# Patient Record
Sex: FEMALE | Race: WHITE | Employment: OTHER | URBAN - NONMETROPOLITAN AREA
[De-identification: names, ages, dates, MRNs, and addresses within clinical notes are randomized per-mention and may not be internally consistent; named-entity substitution may affect disease eponyms.]

---

## 2019-10-03 ENCOUNTER — HOSPITAL ENCOUNTER (OUTPATIENT)
Dept: GENERAL RADIOLOGY | Age: 54
Discharge: HOME | End: 2019-10-03
Payer: COMMERCIAL

## 2019-10-03 ENCOUNTER — HOSPITAL ENCOUNTER (OUTPATIENT)
Dept: PAIN MANAGEMENT | Age: 54
Discharge: HOME | End: 2019-10-03
Payer: COMMERCIAL

## 2019-10-03 VITALS
OXYGEN SATURATION: 98 % | TEMPERATURE: 97.5 F | HEART RATE: 70 BPM | HEIGHT: 65 IN | WEIGHT: 243.38 LBS | BODY MASS INDEX: 40.55 KG/M2 | RESPIRATION RATE: 16 BRPM

## 2019-10-03 DIAGNOSIS — M53.3 SI (SACROILIAC) JOINT DYSFUNCTION: ICD-10-CM

## 2019-10-03 DIAGNOSIS — G89.29 CHRONIC PAIN OF BOTH SHOULDERS: ICD-10-CM

## 2019-10-03 DIAGNOSIS — M25.511 CHRONIC PAIN OF BOTH SHOULDERS: ICD-10-CM

## 2019-10-03 DIAGNOSIS — M25.512 CHRONIC PAIN OF BOTH SHOULDERS: ICD-10-CM

## 2019-10-03 DIAGNOSIS — M70.61 TROCHANTERIC BURSITIS OF RIGHT HIP: ICD-10-CM

## 2019-10-03 DIAGNOSIS — G89.29 CHRONIC BILATERAL LOW BACK PAIN WITHOUT SCIATICA: ICD-10-CM

## 2019-10-03 DIAGNOSIS — M54.50 CHRONIC BILATERAL LOW BACK PAIN WITHOUT SCIATICA: ICD-10-CM

## 2019-10-03 DIAGNOSIS — G57.01 PIRIFORMIS SYNDROME OF RIGHT SIDE: ICD-10-CM

## 2019-10-03 PROCEDURE — 99215 OFFICE O/P EST HI 40 MIN: CPT

## 2019-10-03 PROCEDURE — 99204 OFFICE O/P NEW MOD 45 MIN: CPT | Performed by: NURSE PRACTITIONER

## 2019-10-03 PROCEDURE — 73521 X-RAY EXAM HIPS BI 2 VIEWS: CPT

## 2019-10-03 PROCEDURE — 72110 X-RAY EXAM L-2 SPINE 4/>VWS: CPT

## 2019-10-03 RX ORDER — CLOTRIMAZOLE 1 %
CREAM (GRAM) TOPICAL PRN
COMMUNITY

## 2019-10-03 RX ORDER — HYDROCODONE BITARTRATE AND ACETAMINOPHEN 7.5; 325 MG/1; MG/1
1 TABLET ORAL EVERY 6 HOURS PRN
Qty: 90 TABLET | Refills: 0 | Status: SHIPPED | OUTPATIENT
Start: 2019-10-04 | End: 2019-11-04 | Stop reason: SDUPTHER

## 2019-10-03 RX ORDER — IBUPROFEN 200 MG
200 TABLET ORAL EVERY 8 HOURS PRN
COMMUNITY
End: 2020-07-31 | Stop reason: ALTCHOICE

## 2019-10-03 RX ORDER — FAMOTIDINE 20 MG/1
20 TABLET, FILM COATED ORAL 2 TIMES DAILY
COMMUNITY

## 2019-10-03 RX ORDER — BUSPIRONE HYDROCHLORIDE 5 MG/1
5 TABLET ORAL 2 TIMES DAILY PRN
COMMUNITY
End: 2021-11-23

## 2019-10-03 RX ORDER — ASPIRIN 325 MG
325 TABLET ORAL DAILY
Status: ON HOLD | COMMUNITY
End: 2021-01-26

## 2019-10-14 PROBLEM — M25.511 CHRONIC PAIN OF BOTH SHOULDERS: Status: ACTIVE | Noted: 2019-10-14

## 2019-10-14 PROBLEM — M70.61 TROCHANTERIC BURSITIS OF RIGHT HIP: Status: ACTIVE | Noted: 2019-10-14

## 2019-10-14 PROBLEM — G89.29 CHRONIC PAIN OF BOTH SHOULDERS: Status: ACTIVE | Noted: 2019-10-14

## 2019-10-14 PROBLEM — M25.512 CHRONIC PAIN OF BOTH SHOULDERS: Status: ACTIVE | Noted: 2019-10-14

## 2019-10-14 ASSESSMENT — ENCOUNTER SYMPTOMS
CONSTIPATION: 0
BACK PAIN: 1

## 2019-10-23 ENCOUNTER — HOSPITAL ENCOUNTER (OUTPATIENT)
Dept: PAIN MANAGEMENT | Age: 54
Discharge: HOME | End: 2019-10-23
Payer: COMMERCIAL

## 2019-10-23 VITALS
HEART RATE: 66 BPM | SYSTOLIC BLOOD PRESSURE: 131 MMHG | RESPIRATION RATE: 18 BRPM | DIASTOLIC BLOOD PRESSURE: 59 MMHG | OXYGEN SATURATION: 95 % | TEMPERATURE: 98.6 F

## 2019-10-23 PROCEDURE — 20553 NJX 1/MLT TRIGGER POINTS 3/>: CPT

## 2019-10-23 PROCEDURE — 20611 DRAIN/INJ JOINT/BURSA W/US: CPT | Performed by: NURSE PRACTITIONER

## 2019-10-23 PROCEDURE — 20611 DRAIN/INJ JOINT/BURSA W/US: CPT

## 2019-10-23 PROCEDURE — 20553 NJX 1/MLT TRIGGER POINTS 3/>: CPT | Performed by: NURSE PRACTITIONER

## 2019-10-23 PROCEDURE — 76942 ECHO GUIDE FOR BIOPSY: CPT | Performed by: NURSE PRACTITIONER

## 2019-10-23 PROCEDURE — 6360000002 HC RX W HCPCS

## 2019-10-23 PROCEDURE — 2500000003 HC RX 250 WO HCPCS

## 2019-10-23 RX ORDER — LIDOCAINE HYDROCHLORIDE 10 MG/ML
INJECTION, SOLUTION EPIDURAL; INFILTRATION; INTRACAUDAL; PERINEURAL
Status: COMPLETED | OUTPATIENT
Start: 2019-10-23 | End: 2019-10-23

## 2019-10-23 RX ORDER — TRIAMCINOLONE ACETONIDE 40 MG/ML
INJECTION, SUSPENSION INTRA-ARTICULAR; INTRAMUSCULAR
Status: COMPLETED | OUTPATIENT
Start: 2019-10-23 | End: 2019-10-23

## 2019-10-23 RX ORDER — METHOCARBAMOL 500 MG/1
1000 TABLET, FILM COATED ORAL 4 TIMES DAILY
Qty: 240 TABLET | Refills: 0 | Status: SHIPPED | OUTPATIENT
Start: 2019-10-23 | End: 2019-11-22

## 2019-10-23 RX ORDER — BUPIVACAINE HYDROCHLORIDE 5 MG/ML
INJECTION, SOLUTION EPIDURAL; INTRACAUDAL
Status: COMPLETED | OUTPATIENT
Start: 2019-10-23 | End: 2019-10-23

## 2019-10-23 RX ADMIN — TRIAMCINOLONE ACETONIDE 80 MG: 40 INJECTION, SUSPENSION INTRA-ARTICULAR; INTRAMUSCULAR at 13:20

## 2019-10-23 RX ADMIN — BUPIVACAINE HYDROCHLORIDE 13 ML: 5 INJECTION, SOLUTION EPIDURAL; INTRACAUDAL at 13:18

## 2019-10-23 RX ADMIN — LIDOCAINE HYDROCHLORIDE 13 ML: 10 INJECTION, SOLUTION EPIDURAL; INFILTRATION; INTRACAUDAL; PERINEURAL at 13:19

## 2019-10-25 ENCOUNTER — TELEPHONE (OUTPATIENT)
Dept: PAIN MANAGEMENT | Age: 54
End: 2019-10-25

## 2019-11-04 DIAGNOSIS — M54.50 CHRONIC BILATERAL LOW BACK PAIN WITHOUT SCIATICA: ICD-10-CM

## 2019-11-04 DIAGNOSIS — G89.29 CHRONIC BILATERAL LOW BACK PAIN WITHOUT SCIATICA: ICD-10-CM

## 2019-11-04 RX ORDER — HYDROCODONE BITARTRATE AND ACETAMINOPHEN 7.5; 325 MG/1; MG/1
1 TABLET ORAL EVERY 6 HOURS PRN
Qty: 90 TABLET | Refills: 0 | Status: SHIPPED | OUTPATIENT
Start: 2019-11-04 | End: 2019-12-02 | Stop reason: ALTCHOICE

## 2019-12-01 ENCOUNTER — TELEPHONE (OUTPATIENT)
Dept: PAIN MANAGEMENT | Age: 54
End: 2019-12-01

## 2019-12-01 DIAGNOSIS — G89.11 ACUTE PAIN DUE TO INJURY: Primary | ICD-10-CM

## 2019-12-02 ENCOUNTER — HOSPITAL ENCOUNTER (OUTPATIENT)
Dept: MRI IMAGING | Age: 54
Discharge: HOME | End: 2019-12-02
Payer: COMMERCIAL

## 2019-12-02 ENCOUNTER — HOSPITAL ENCOUNTER (OUTPATIENT)
Dept: PAIN MANAGEMENT | Age: 54
Discharge: HOME | End: 2019-12-02
Payer: COMMERCIAL

## 2019-12-02 VITALS
TEMPERATURE: 98.6 F | HEIGHT: 65 IN | BODY MASS INDEX: 40.48 KG/M2 | RESPIRATION RATE: 18 BRPM | DIASTOLIC BLOOD PRESSURE: 84 MMHG | SYSTOLIC BLOOD PRESSURE: 130 MMHG | HEART RATE: 85 BPM | WEIGHT: 243 LBS | OXYGEN SATURATION: 99 %

## 2019-12-02 DIAGNOSIS — G89.11 ACUTE PAIN DUE TO INJURY: ICD-10-CM

## 2019-12-02 DIAGNOSIS — Z71.6 ENCOUNTER FOR SMOKING CESSATION COUNSELING: ICD-10-CM

## 2019-12-02 DIAGNOSIS — G89.29 CHRONIC BILATERAL LOW BACK PAIN WITHOUT SCIATICA: Primary | ICD-10-CM

## 2019-12-02 DIAGNOSIS — M54.50 CHRONIC BILATERAL LOW BACK PAIN WITHOUT SCIATICA: Primary | ICD-10-CM

## 2019-12-02 PROCEDURE — 99214 OFFICE O/P EST MOD 30 MIN: CPT | Performed by: NURSE PRACTITIONER

## 2019-12-02 PROCEDURE — 99407 BEHAV CHNG SMOKING > 10 MIN: CPT | Performed by: NURSE PRACTITIONER

## 2019-12-02 PROCEDURE — 99214 OFFICE O/P EST MOD 30 MIN: CPT

## 2019-12-02 PROCEDURE — 6360000002 HC RX W HCPCS

## 2019-12-02 PROCEDURE — 72148 MRI LUMBAR SPINE W/O DYE: CPT

## 2019-12-02 RX ORDER — HYDROCODONE BITARTRATE AND ACETAMINOPHEN 10; 325 MG/1; MG/1
1 TABLET ORAL EVERY 8 HOURS PRN
Qty: 90 TABLET | Refills: 0 | Status: SHIPPED | OUTPATIENT
Start: 2019-12-02 | End: 2020-05-22

## 2019-12-02 RX ORDER — TRIAMCINOLONE ACETONIDE 40 MG/ML
80 INJECTION, SUSPENSION INTRA-ARTICULAR; INTRAMUSCULAR ONCE
Status: DISCONTINUED | OUTPATIENT
Start: 2019-12-02 | End: 2019-12-04 | Stop reason: HOSPADM

## 2019-12-02 RX ORDER — HYDROCODONE BITARTRATE AND ACETAMINOPHEN 10; 325 MG/1; MG/1
1 TABLET ORAL EVERY 8 HOURS PRN
Qty: 90 TABLET | Refills: 0 | Status: SHIPPED | OUTPATIENT
Start: 2020-01-02 | End: 2020-01-23

## 2019-12-02 ASSESSMENT — PAIN DESCRIPTION - PAIN TYPE: TYPE: CHRONIC PAIN

## 2019-12-02 ASSESSMENT — PAIN - FUNCTIONAL ASSESSMENT: PAIN_FUNCTIONAL_ASSESSMENT: PREVENTS OR INTERFERES SOME ACTIVE ACTIVITIES AND ADLS

## 2019-12-02 ASSESSMENT — PAIN DESCRIPTION - FREQUENCY: FREQUENCY: CONTINUOUS

## 2019-12-02 ASSESSMENT — PAIN DESCRIPTION - DIRECTION: RADIATING_TOWARDS: DOES NOT RADIATE

## 2019-12-02 ASSESSMENT — ENCOUNTER SYMPTOMS
CONSTIPATION: 0
BACK PAIN: 1

## 2019-12-02 ASSESSMENT — PAIN DESCRIPTION - DESCRIPTORS: DESCRIPTORS: ACHING;CONSTANT

## 2019-12-02 ASSESSMENT — PAIN DESCRIPTION - PROGRESSION: CLINICAL_PROGRESSION: NOT CHANGED

## 2019-12-02 ASSESSMENT — PAIN DESCRIPTION - ORIENTATION: ORIENTATION: LOWER

## 2019-12-02 ASSESSMENT — PAIN DESCRIPTION - LOCATION: LOCATION: BACK

## 2019-12-02 ASSESSMENT — PAIN DESCRIPTION - ONSET: ONSET: ON-GOING

## 2019-12-02 ASSESSMENT — PAIN SCALES - GENERAL: PAINLEVEL_OUTOF10: 7

## 2019-12-13 ENCOUNTER — HOSPITAL ENCOUNTER (OUTPATIENT)
Dept: PAIN MANAGEMENT | Age: 54
Discharge: HOME | End: 2019-12-13
Payer: COMMERCIAL

## 2019-12-13 VITALS
OXYGEN SATURATION: 96 % | SYSTOLIC BLOOD PRESSURE: 132 MMHG | HEART RATE: 80 BPM | RESPIRATION RATE: 20 BRPM | TEMPERATURE: 97.7 F | DIASTOLIC BLOOD PRESSURE: 67 MMHG

## 2019-12-13 VITALS
DIASTOLIC BLOOD PRESSURE: 70 MMHG | RESPIRATION RATE: 18 BRPM | OXYGEN SATURATION: 99 % | SYSTOLIC BLOOD PRESSURE: 111 MMHG | HEART RATE: 68 BPM

## 2019-12-13 DIAGNOSIS — R52 PAIN MANAGEMENT: ICD-10-CM

## 2019-12-13 PROCEDURE — 3209999900 FLUORO FOR SURGICAL PROCEDURES

## 2019-12-13 PROCEDURE — 2500000003 HC RX 250 WO HCPCS

## 2019-12-13 PROCEDURE — 6360000002 HC RX W HCPCS

## 2019-12-13 PROCEDURE — 62323 NJX INTERLAMINAR LMBR/SAC: CPT

## 2019-12-13 PROCEDURE — 2580000003 HC RX 258

## 2019-12-13 RX ORDER — METHYLPREDNISOLONE ACETATE 80 MG/ML
INJECTION, SUSPENSION INTRA-ARTICULAR; INTRALESIONAL; INTRAMUSCULAR; SOFT TISSUE
Status: COMPLETED | OUTPATIENT
Start: 2019-12-13 | End: 2019-12-13

## 2019-12-13 RX ORDER — LIDOCAINE HYDROCHLORIDE 10 MG/ML
INJECTION, SOLUTION EPIDURAL; INFILTRATION; INTRACAUDAL; PERINEURAL
Status: COMPLETED | OUTPATIENT
Start: 2019-12-13 | End: 2019-12-13

## 2019-12-13 RX ORDER — SODIUM CHLORIDE 9 MG/ML
INJECTION INTRAVENOUS
Status: COMPLETED | OUTPATIENT
Start: 2019-12-13 | End: 2019-12-13

## 2019-12-13 RX ADMIN — LIDOCAINE HYDROCHLORIDE 5 ML: 10 INJECTION, SOLUTION EPIDURAL; INFILTRATION; INTRACAUDAL; PERINEURAL at 11:04

## 2019-12-13 RX ADMIN — METHYLPREDNISOLONE ACETATE 80 MG: 80 INJECTION, SUSPENSION INTRA-ARTICULAR; INTRALESIONAL; INTRAMUSCULAR; SOFT TISSUE at 11:04

## 2019-12-13 RX ADMIN — SODIUM CHLORIDE 5 ML: 9 INJECTION INTRAVENOUS at 11:04

## 2019-12-13 ASSESSMENT — PAIN - FUNCTIONAL ASSESSMENT: PAIN_FUNCTIONAL_ASSESSMENT: 0-10

## 2019-12-18 ENCOUNTER — TELEPHONE (OUTPATIENT)
Dept: PAIN MANAGEMENT | Age: 54
End: 2019-12-18

## 2020-01-24 RX ORDER — HYDROCODONE BITARTRATE AND ACETAMINOPHEN 10; 325 MG/1; MG/1
1 TABLET ORAL EVERY 8 HOURS PRN
Qty: 90 TABLET | Refills: 0 | Status: SHIPPED | OUTPATIENT
Start: 2020-01-27 | End: 2020-03-11 | Stop reason: SDUPTHER

## 2020-01-27 RX ORDER — HYDROCODONE BITARTRATE AND ACETAMINOPHEN 10; 325 MG/1; MG/1
1 TABLET ORAL EVERY 8 HOURS PRN
Qty: 90 TABLET | Refills: 0 | Status: CANCELLED | OUTPATIENT
Start: 2020-01-27 | End: 2020-02-26

## 2020-02-13 RX ORDER — METHOCARBAMOL 500 MG/1
500 TABLET, FILM COATED ORAL 4 TIMES DAILY
Qty: 120 TABLET | Refills: 0 | Status: SHIPPED | OUTPATIENT
Start: 2020-02-13 | End: 2020-03-10 | Stop reason: DRUGHIGH

## 2020-02-17 ENCOUNTER — HOSPITAL ENCOUNTER (OUTPATIENT)
Dept: PAIN MANAGEMENT | Age: 55
Discharge: HOME | End: 2020-02-17
Payer: COMMERCIAL

## 2020-02-17 VITALS
SYSTOLIC BLOOD PRESSURE: 137 MMHG | OXYGEN SATURATION: 97 % | WEIGHT: 231.13 LBS | DIASTOLIC BLOOD PRESSURE: 84 MMHG | BODY MASS INDEX: 38.51 KG/M2 | HEIGHT: 65 IN | TEMPERATURE: 98.1 F | HEART RATE: 81 BPM

## 2020-02-17 PROBLEM — M43.10 ANTEROLISTHESIS: Status: ACTIVE | Noted: 2020-02-17

## 2020-02-17 PROCEDURE — 99214 OFFICE O/P EST MOD 30 MIN: CPT | Performed by: NURSE PRACTITIONER

## 2020-02-17 PROCEDURE — 99213 OFFICE O/P EST LOW 20 MIN: CPT

## 2020-02-17 ASSESSMENT — PAIN SCALES - GENERAL: PAINLEVEL_OUTOF10: 5

## 2020-02-17 ASSESSMENT — PAIN DESCRIPTION - LOCATION: LOCATION: BACK;SHOULDER

## 2020-02-17 ASSESSMENT — PAIN DESCRIPTION - PAIN TYPE: TYPE: CHRONIC PAIN

## 2020-02-17 ASSESSMENT — PAIN DESCRIPTION - ORIENTATION: ORIENTATION: LOWER;RIGHT

## 2020-02-17 NOTE — PROGRESS NOTES
[facility] Physical [facility] Pain Medicine  Office Note    Patient Name: Ronald Edmondson    MR #: [id number]    Account #: [de-identified]    : 1965    Age: 62 y.o. Sex: female    Date: 2020    PCP: Sandy Oliveira    Chief Complaint: No chief complaint on file. History of Present Illness:     Ronald Edmondson is a 62 y.o. female who presents to the office for follow-up of procedure. Post procedure phone call on 2019 which was 5 days post injection patient reported that she felt the injection was helping, she was able to run errands and felt more steady on her feet. Patient's pain score went from a 7-8 down to a 1-2. And patient stated that she had at least 80% of her pain relieved by the injection and that she felt that the source of pain was targeted with the injection. Patient just returned from a 15-day cruise to [facility] in which she said 1 day she was unable to go offshore for excursions because of hip pain. Patient's biggest complaint is low back pain that goes into the right hip region patient had reported to the nurse today that LESI was not effective however patient's main complaint is the hip pain. Patient is due for a right SI, right greater trochanteric bursa and right piriformis in which she received good results from this injection in the past.  Patient does have instability in the lumbar spine according to the x-ray and patient would like to have a neurosurgical consultation. Back Pain   This is a chronic problem. The current episode started more than 1 year ago. The problem occurs constantly. The problem has been gradually worsening since onset. The pain is present in the lumbar spine, sacro-iliac and gluteal. The quality of the pain is described as aching, cramping and shooting. Radiates to: Intermittent radicular pain. The symptoms are aggravated by sitting, bending and standing. Associated symptoms include leg pain and numbness (Right lower extremity).  Pertinent negatives include no bladder incontinence, bowel incontinence or weakness. Last Procedure since last office visit:   Was Percentage of Pain Relief after Lumbar Epidural L4-5 on 12/13/2019:  60 %    How long lasted:  6 weeks No  Were you able to decrease pain medication after treatment? No  Were you able to increase activity after treatment? No  Did you have any adverse reactions to treatment? No    Screening Tools:  PEG Score: 7     Last PEG Score: 7.7     Annual ORT Score: 8     Annual PHQ Score: 6    Current Pain Assessment  Pain Assessment  Pain Assessment: 0-10  Pain Level: 5  Pain Type: Chronic pain  Pain Location: Back, Shoulder  Pain Orientation: Lower, Right    Past Visit HPI:  12/2/2018  presents to the office for procedure follow-up and office visit. Patient had right SI and right trochanteric bursa injection with ultrasound and right piriformis on 10/23/2019. Patient sustained 50% relief for approximately 1 month before diminishing of pain. Patient stated that the injection targeted the area of pain.     Patient requested sooner appointment due to increase in low back pain. Patient had called provider at home regarding acute low back pain with numbness to right lower extremity that had started suddenly on Saturday. 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 Patient stated that she had done fine on Friday and then on Saturday she just had and a sudden acute onset of low back pain that was gripping. At the time pain medication was not controlling her pain. Patient stated that she started to have numbness to her right foot that is intermittent and that when she is having the numbness that she feels as if she may fall. Patient rated her pain a 10 out of a 10 she described it is a stabbing in the low back. Patient states that sitting on the toilet will cause an increase in the radicular pain.   Patient states that she has to lean on the shopping cart when she is ambulating to help with the low back pain. Patient states that cleaning such as sweeping mopping or doing anything side to side significantly increases low back pain. MRI was ordered of the lumbar spine. Patient does have instability that was on x-ray of the lumbar spine.   However at the time the x-ray was taken patient was not having any type of radicular or nerve symptoms.     New Patient Appointment  10/3/19  presents with referral from [place] [address] primary complaints of chronic low back pain.  Pain has been a gradual onset that started around 2006. Rita Shines is aggravated by lifting bending and walking.  Patient had imaging done by Dr. Dieter Pradhan in [facility] which indicated she had 2 bulging disks.  Per patient she was told that she had 2 dead disks and 2 bulging disks in the lumbar region. Arlet Willingham was told that the hip pain was deferred from the bulging disks and this is progressively worsened over the past 12 years. Vero Crocker states the pain is now constant and causes a limp.  Patient has been tried on several nerve medications doxepin caused patient to sleepwalk, gabapentin caused patient to lose time, and amitriptyline made her feel as if her blood was boiling.  Patient is tried massage therapy but this causes her to become more tense.  Aggravating factors include ambulation prolonged sitting prolonged standing.  Patient states that she does not hurt in the morning but it progresses as the day goes on.     Other areas of pain that will be addressed at a later time is bilateral shoulder pain, hip pain and left knee pain.     Of note: Patient has numerous medical allergies penicillin causes hives, erythromycin causes hives, doxepin causes sleepwalking, gabapentin causes loss of memory, amitriptyline makes her feel like her blood boiling, Daypro eyes swelled shut, Soma horrible headaches, Percocet nightmares, Demerol nausea, statins muscle spasms, meloxicam irritates stomach ulcers, citalopram needed      clotrimazole (LOTRIMIN) 1 % cream Apply topically 2 times daily Apply topically 2 times daily.  diclofenac sodium 1 % GEL Apply 2 g topically 4 times daily as needed for Pain      DICLOFENAC PO Take 75 mg by mouth 2 times daily      famotidine (PEPCID) 20 MG tablet Take 20 mg by mouth 2 times daily      ibuprofen (ADVIL;MOTRIN) 200 MG tablet Take 200 mg by mouth every 8 hours as needed for Pain      mupirocin (BACTROBAN) 2 % ointment Apply topically as needed Apply topically 3 times daily.  diclofenac (FLECTOR) 1.3 % patch Place 1 patch onto the skin 2 times daily      hydrochlorothiazide (MICROZIDE) 12.5 MG capsule Take 25 mg by mouth daily       lisinopril (PRINIVIL;ZESTRIL) 20 MG tablet Take 10 mg by mouth daily       nystatin (MYCOSTATIN) 619727 UNIT/GM cream Apply topically 2 times daily Apply topically 2 times daily.  ALPRAZolam (XANAX) 1 MG tablet Take 1 mg by mouth nightly as needed for Sleep      Clobetasol Propionate Emulsion 0.05 % FOAM       hydrochlorothiazide (HYDRODIURIL) 12.5 MG tablet        No current facility-administered medications for this encounter. Review of Systems:  Review of Systems   Gastrointestinal: Negative for bowel incontinence and constipation. Genitourinary: Negative for bladder incontinence. Musculoskeletal: Positive for arthralgias, back pain and myalgias. Negative for neck pain. Neurological: Positive for numbness (Right lower extremity). Negative for weakness. Psychiatric/Behavioral: Positive for sleep disturbance. Negative for agitation, self-injury and suicidal ideas. The patient is not nervous/anxious. 14 point ROS negative besides that noted in HPI    Physical Exam:    Vitals:    02/17/20 1212   BP: 137/84   Pulse: 81   Temp: 98.1 °F (36.7 °C)   TempSrc: Temporal   SpO2: 97%   Weight: 231 lb 2 oz (104.8 kg)   Height: 5' 5\" (1.651 m)       Body mass index is 38.46 kg/m².     Physical Exam  Vitals signs and nursing note reviewed. Constitutional:       General: She is not in acute distress. Appearance: She is well-developed. HENT:      Head: Normocephalic. Right Ear: External ear normal.      Left Ear: External ear normal.      Nose: Nose normal.   Eyes:      Conjunctiva/sclera: Conjunctivae normal.      Pupils: Pupils are equal, round, and reactive to light. Neck:      Musculoskeletal: Normal range of motion and neck supple. Cardiovascular:      Rate and Rhythm: Normal rate. Pulmonary:      Effort: Pulmonary effort is normal.   Abdominal:      General: Bowel sounds are normal.      Palpations: Abdomen is soft. Musculoskeletal:      Right shoulder: She exhibits decreased range of motion and tenderness. Left shoulder: She exhibits decreased range of motion and tenderness. Lumbar back: She exhibits decreased range of motion, tenderness, pain and spasm. Comments: Unchanged assessment  Positive distraction, positive thigh thrust, positive Bharath's right greater than left  Right piriformis is tender taut and ropelike with trigger points identified  Greater trochanter tender to palpation on right  No foot drop or clonus noted   Skin:     General: Skin is warm and dry. Neurological:      Mental Status: She is alert and oriented to person, place, and time. Cranial Nerves: No cranial nerve deficit. Sensory: No sensory deficit. Psychiatric:         Behavior: Behavior normal.         Thought Content: Thought content normal.         Judgment: Judgment normal.       Most recent imaging, testing, and response to counseling and/or rehabilitation were reviewed with patient.   [x] Yes  [] No    Labs:  No results found for: NA, K, CL, CO2, BUN, CREATININE, GLUCOSE, CALCIUM     No results found for: WBC, HGB, HCT, MCV, PLT    Assessment:  Principal Problem:    Chronic bilateral low back pain without sciatica  Active Problems:    SI (sacroiliac) joint dysfunction    Piriformis syndrome of right side    Chronic pain of both shoulders    Trochanteric bursitis of right hip    Anterolisthesis  Resolved Problems:    * No resolved hospital problems. *      Plan:  1. Referral to Dr. Toña Coker for anterior listhesis 6 mm to 2 mm increase with flexion  2. Continue medications as previously prescribed  3. Schedule Right SI and Right TB with us and Right Piriformis Trigger point injections  4. Patient to continue exercises  5. And to follow-up post procedure or sooner if needed    Discussion: Discussed exam findings and plan of care with patient. Patient agreed with POC and questions were asked and answered. Activity: Discussed exercise as beneficial to pain reduction, encouraged daily stretching with a focus on torso strengthening. Goal:  Pain Management Goals of Therapy:   [] Resolution in pain  [x] Decrease in pain level  [x] Improvement in ADL's  [x] Increase in activities with less pain  [] Decrease in medication      Controlled substance monitoring:    [x] Discussed medication side effects, risk of tolerance and/or dependence, and/or alternative treatment  [] Discussed the detrimental effects of long term narcotic use in younger patients especially women of childbearing years.   [x] No signs and symptoms of potential drug abuse or diversion were identified  [] Signs of potential drug abuse or diversion were identified   [] ORT Score   [] UDS non-compliant   [] See Note  [] Random urine drug screen sent today  [x] Random urine drug screen not completed today   [] Deferred New Patient  [x] Compliant  12/2/19  [] Not Compliant see note  [] Medication agreement with provider signed today  [x] Medication agreement with provider on file under media   [] Medication regimen effective and continued   [] New patient continuing current medication while developing POC   [x] On going assessment and evaluation of medication regimen  [] Medication regimen not effective see plan for changes  [x] Magdy Tapia reviewed & on file

## 2020-02-18 ASSESSMENT — ENCOUNTER SYMPTOMS
BACK PAIN: 1
BOWEL INCONTINENCE: 0
CONSTIPATION: 0

## 2020-02-21 ENCOUNTER — TELEPHONE (OUTPATIENT)
Dept: NEUROSURGERY | Age: 55
End: 2020-02-21

## 2020-02-28 ENCOUNTER — TELEPHONE (OUTPATIENT)
Dept: NEUROSURGERY | Age: 55
End: 2020-02-28

## 2020-02-28 NOTE — TELEPHONE ENCOUNTER
Second attempt to reach patient to schedule new patient appointment with neurosurgery. I left a voicemail on the home phone but was unable to leave message on the mobile phone. I left our callback number in my message.

## 2020-02-28 NOTE — TELEPHONE ENCOUNTER
1. Referring physician? Malik Tim    2. Reason for referral?  Back pain    3. Who is completing questionnaire? Patient    4. Has the patient had any previous spinal/brain surgeries? NO    5. Has the patient seen another neurosurgeon and/or orthopedic surgeon for this issue in the past?          NO    6. Have MRI images been obtained within the last year? YES    7. If yes, where was the MRI performed? [facility]    a. What part of the body? Lumbar spine    b. When was it obtained? 12/2/19    8. Has the patient had a NCV/EMG within the last year? NO    9. Employment Status? Retired    8. Does the patient draw disability? NO    11. Symptoms? Low back pain, bilateral leg pain R>L, low back pain also R sided > L side. Patient states her back pain bothers her more.

## 2020-03-05 ENCOUNTER — TELEPHONE (OUTPATIENT)
Dept: PAIN MANAGEMENT | Age: 55
End: 2020-03-05

## 2020-03-05 ENCOUNTER — OFFICE VISIT (OUTPATIENT)
Dept: NEUROSURGERY | Age: 55
End: 2020-03-05
Payer: COMMERCIAL

## 2020-03-05 VITALS
WEIGHT: 245 LBS | DIASTOLIC BLOOD PRESSURE: 80 MMHG | OXYGEN SATURATION: 97 % | BODY MASS INDEX: 40.82 KG/M2 | HEART RATE: 82 BPM | HEIGHT: 65 IN | SYSTOLIC BLOOD PRESSURE: 144 MMHG

## 2020-03-05 PROCEDURE — 99204 OFFICE O/P NEW MOD 45 MIN: CPT | Performed by: NEUROLOGICAL SURGERY

## 2020-03-05 ASSESSMENT — ENCOUNTER SYMPTOMS
BACK PAIN: 1
EYES NEGATIVE: 1
CONSTIPATION: 1
WHEEZING: 1

## 2020-03-05 NOTE — TELEPHONE ENCOUNTER
I spoke with patient the other day regarding her muscle relaxer and forwarded message on to Avelina Steen. Called patient back and discussed whether she would like to increase the Robaxin or try Flexeril. Patient is ok with remaining on Robaxin, and so I will let Avelina Steen know and can send in with patient's next refill. She is not currently out of medication at this time, and is requesting increase with upcoming refill.

## 2020-03-05 NOTE — PROGRESS NOTES
Review of Systems   Constitutional: Negative. HENT: Negative. Eyes: Negative. Respiratory: Positive for wheezing. Cardiovascular: Negative. Gastrointestinal: Positive for constipation. Genitourinary: Negative. Musculoskeletal: Positive for back pain, joint pain and myalgias. Skin: Negative. Neurological: Negative. Endo/Heme/Allergies: Negative. Psychiatric/Behavioral: The patient is nervous/anxious.

## 2020-03-05 NOTE — PROGRESS NOTES
[facility] Neurosurgery  Office Visit      Chief Complaint   Patient presents with    Back Pain     Right sided low back pain that radiates into right buttock       HISTORY OF PRESENT ILLNESS:    Ronald Edmondson is a 62 y.o. female who presents with a long standing history of low back pain. The pain does radiate into the right buttocks and typically stops at the buttock, however, she has started having pain behind the knees over the last week. Her pain is mostly located in the low back. The patient denies numbness. She does mention having a history of \"sciatic\" pain that typically goes away with rest and medication, however, this episode has not subsided. She reports that any repetitive movements such as sweeping exacerbates her pain. She will often \"catch\" her right foot on steps or stumble over her toe. She has been seeing [facility] and has received LESI of L4-5, right greater trochanteric bursa and right piriformis injections. She mentions good success with the bursa injections. Her pain is worsened when going from a seated to standing position. Her pain is worsened with walking. Her pain is worsened when lying flat. Overall, indicative that the patient does have a mechanical nature to their pain. She states that 60% of her pain is located in the back and 40% is right buttock pain. The patient has underwent a non-operative treatment course that has included:  NSAIDs (diclofenac patch)  Muscle Relaxers (Robaxin)  Opiates (Norco - approx. 10 years)  Physical Therapy with core strengthening (multiple times)  Epidural Steroid Injections (Dr. Jesus TAVAREZ)  Massage Therapy      Of note she does use tobacco and does take blood thinning medications (ASA).                Past Medical History:   Diagnosis Date    Anxiety     Asthma     Colon polyps     DDD (degenerative disc disease), cervical     Depression     Essential hypertension 10/19/2016    GERD needed for Pain for up to 30 days. May fill today dosage increase Intended supply: 30 days 90 tablet 0     No current facility-administered medications for this visit. Allergies:  Citalopram; Daypro [oxaprozin]; Demerol hcl [meperidine]; Doxepin; Elavil [amitriptyline hcl]; Erythromycin; Gabapentin; Meloxicam; Penicillins; Percocet [oxycodone-acetaminophen]; Soma [carisoprodol]; and Statins    Social History:   Social History     Tobacco Use   Smoking Status Current Every Day Smoker    Start date: 10/19/1988   Smokeless Tobacco Never Used     Social History     Substance and Sexual Activity   Alcohol Use Not Currently         Family History:   Family History   Problem Relation Age of Onset    Parkinsonism Mother     Alzheimer's Disease Mother     Stroke Father     Substance Abuse Brother        REVIEW OF SYSTEMS:  Constitutional: Negative. HENT: Negative. Eyes: Negative. Respiratory: Positive for wheezing. Cardiovascular: Negative. Gastrointestinal: Positive for constipation. Genitourinary: Negative. Musculoskeletal: Positive for back pain, joint pain and myalgias. Skin: Negative. Neurological: Negative. Endo/Heme/Allergies: Negative. Psychiatric/Behavioral: The patient is nervous/anxious. PHYSICAL EXAM:  Vitals:    03/05/20 1359   BP: (!) 144/80   Pulse: 82   SpO2: 97%     Constitutional: appears well-developed and well-nourished. Eyes - conjunctiva normal.  Pupils react to light  Ear, nose, throat - hearing intact to finger rub, No scars, masses, or lesions over external nose or ears, no atrophy oftongue  Neck- symmetric, no masses noted, no jugular vein distension  Respiration- chest wall appears symmetric, good expansion, normal effort without use of accessory muscles  Musculoskeletal - no significant wasting of muscles noted, no bony deformities, gait no gross ataxia  Extremities- no clubbing, cyanosis oredema  Skin - warm, dry, and intact.   No rash, erythema, or pallor. Psychiatric - mood, affect, and behavior appear normal.     Neurologic Examination  Awake, Alert and oriented x 4  Normal speech pattern, following commands    Motor:  RIGHT:    iliopsoas 5/5    knee flexor 5/5    knee extension 5/5    EHL/dorsiflexion 5/5    plantar flexion 5/5    LEFT:      iliopsoas 5/5    knee flexor 5/5    knee extension 5/5    EHL/dorsiflexion 5/5    plantar flexion 5/5    No deficits to light touch or pinprick sensation  Reflexes are 2+ and symmetric  No myofacial tenderness to palpation  Slight antalgic Gait pattern    She can actually stand up and bend at the waist and touch her toes without severe pain. DATA and IMAGING:    Nursing/pcp notes, imaging, labs, and vitals reviewed. PT,OT and/or speech notes reviewed    No results found for: WBC, HGB, HCT, MCV, PLTNo results found for: NA, K, CL, CO2, BUN, CREATININE, GLUCOSE, CALCIUM, PROT, LABALBU, BILITOT, ALKPHOS, AST, ALT, LABGLOM, GFRAA, AGRATIO, GLOBNo results found for: INR, PROTIME    EXAMINATION:  MRI LUMBAR SPINE WO CONTRAST  12/2/2019 11:57 AM   HISTORY: Low back pain with right leg numbness. SI joint injection and   October 2019. COMPARISON: No comparison study. TECHNIQUE: Multiplanar imaging was performed. FINDINGS: Ale Coil is a hemangioma in the T12 vertebral body versus   focal fatty infiltration along the superior endplate. No other   significant bone marrow signal abnormality is seen. The inferior tip   of the conus is at the superior endplate of L1. The visualized lower   thoracic spinal cord demonstrates no signal abnormality. At L5-S1, there is severe disc narrowing. There is a small central and   right paracentral disc osteophyte complex producing minimal dural sac   compression. There is mild facet arthropathy. There is no significant   central spinal canal stenosis. There is minimal inferior recess   foraminal narrowing due to endplate spurring.    At L4-5, there is severe disc narrowing and Female, chronic bilateral low back pain without   sciatica   COMPARISON: None   TECHNIQUE:  3 images.  Lateral neutral, flexion, extension views   FINDINGS:     Limited evaluation due to technique. Assuming there are 5 lumbar type vertebral bodies, there is 6 mm   anterolisthesis L3 on L4, which increases to 8 mm anterolisthesis with   flexion. There is 6 mm anterolisthesis with extension. Disc height loss greatest at L4-5 with apparent vacuum disc   phenomenon. Facet hypertrophy. Surgical clips in the abdomen, most commonly due to cholecystectomy. Gastric lap band, incompletely assessed on this exam.       Impression   1. Limited evaluation due to technique. 2. There appears to be 6 mm anterolisthesis of L3 on L4 (assuming   conventional anatomy with 5 lumbar type vertebral bodies), which   increases to 8 mm anterolisthesis with flexion. 3. Disc height loss greatest at L4-5. Signed by Dr Saad Ho on 10/3/2019 3:18 PM   I have personally reviewed the images and my interpretation is: There a spondylolisthesis at L3-4 that measures 6mm on extension and 8 mm on flexion      ASSESSMENT:    Jann Garza is a 62 y.o. female with a spondylolisthesis of L3-4 with some mild to moderate instability and some narrowing at L3-4 and L4-5. She complains of low back and RLE pain. ICD-10-CM    1. Spondylolisthesis at L3-L4 level M43.16    2. Lumbar disc herniation M51.26    3. Stenosis of lateral recess of lumbar spine M48.061    4. Chronic midline low back pain with right-sided sciatica M54.41     G89.29    5. Right buttock pain M79.18        PLAN:  We have discussed and reviewed the results of the MRI/XR lumbar spine with Mrs. Rodríguez at length.   We explained that she does have a spondylolisthesis of L3-4 that does show some instability, however, that being said, her canal stenosis is mild to moderate, she feels as if her pain is mostly tolerable and being treated most adequately through pain management

## 2020-03-10 RX ORDER — METHOCARBAMOL 750 MG/1
750 TABLET, FILM COATED ORAL 3 TIMES DAILY
Qty: 90 TABLET | Refills: 2 | Status: SHIPPED | OUTPATIENT
Start: 2020-03-10 | End: 2020-06-01 | Stop reason: SDUPTHER

## 2020-03-11 ENCOUNTER — HOSPITAL ENCOUNTER (OUTPATIENT)
Dept: PAIN MANAGEMENT | Age: 55
Discharge: HOME | End: 2020-03-11
Payer: COMMERCIAL

## 2020-03-11 VITALS
OXYGEN SATURATION: 97 % | TEMPERATURE: 98.4 F | HEART RATE: 82 BPM | DIASTOLIC BLOOD PRESSURE: 89 MMHG | RESPIRATION RATE: 18 BRPM | SYSTOLIC BLOOD PRESSURE: 169 MMHG

## 2020-03-11 PROCEDURE — 6360000002 HC RX W HCPCS

## 2020-03-11 PROCEDURE — 20553 NJX 1/MLT TRIGGER POINTS 3/>: CPT

## 2020-03-11 PROCEDURE — 2500000003 HC RX 250 WO HCPCS

## 2020-03-11 PROCEDURE — 20553 NJX 1/MLT TRIGGER POINTS 3/>: CPT | Performed by: NURSE PRACTITIONER

## 2020-03-11 PROCEDURE — 20611 DRAIN/INJ JOINT/BURSA W/US: CPT

## 2020-03-11 PROCEDURE — 20611 DRAIN/INJ JOINT/BURSA W/US: CPT | Performed by: NURSE PRACTITIONER

## 2020-03-11 PROCEDURE — 76942 ECHO GUIDE FOR BIOPSY: CPT | Performed by: NURSE PRACTITIONER

## 2020-03-11 RX ORDER — HYDROCODONE BITARTRATE AND ACETAMINOPHEN 10; 325 MG/1; MG/1
1 TABLET ORAL EVERY 8 HOURS PRN
Qty: 90 TABLET | Refills: 0 | Status: SHIPPED | OUTPATIENT
Start: 2020-03-11 | End: 2020-03-31 | Stop reason: SDUPTHER

## 2020-03-11 NOTE — PROCEDURES
proposed injection sites were the sprayed with Gebauer's Solution while protecting patient eyes. Each trigger point of the muscles - Gluteus minimus,  Gluteus Kurtis, and Piriformis was injected with approximately 1-2 ml of a solution of 5 ml of 1% Lidocaine Plain and 5 ml of 0.5% Marcaine Plain after negative aspiration. IF this was a bilateral procedure, the patient was rolled over to the opposite side and the same steps were followed. Discharge: The patient tolerated the procedure well. There were no complications during the procedure and the patient was discharged home with discharge instructions. The patient has been instructed to contact the office should there be any complications or questions to arise between today and their next appointment. Plan:   Will return to the office in  6 weeks for Procedure Follow-up  and Office Visit      DAYSI Lei CNP, 3/11/2020 at 1:09 PM

## 2020-03-11 NOTE — PROGRESS NOTES
Procedure:  Level of Consciousness: [x]Alert [x]Oriented []Disoriented []Lethargic  Anxiety Level: [x]Calm []Anxious []Depressed []Other  Skin: [x]Warm [x]Dry []Cool []Moist []Intact []Other  Cardiovascular: [x]Palpitations: [x]Never []Occasionally []Frequently  Chest Pain: [x]No []Yes  Respiratory:  [x]Unlabored []Labored []Cough ([] Productive []Unproductive)  HCG Required: [x]No []Yes   Results: []Negative []Positive  Knowledge Level:        [x]Patient/Other verbalized understanding of pre-procedure instructions. [x]Assessment of post-op care needs (transportation, responsible caregiver)        [x]Able to discuss health care problems and how to deal with it.   Factors that Affect Teaching:        Language Barrier: [x]No []Yes - why:        Hearing Loss:        [x]No []Yes            Corrective Device:  []Yes [x]No        Vision Loss:           []No [x]Yes            Corrective Device:  [x]Yes []No        Memory Loss:       [x]No []Yes            []Short Term []Long Term  Motivational Level:  [x]Asks Questions                  []Extremely Anxious       [x]Seems Interested               []Seems Uninterested                  [x]Denies need for Education  Risk for Injury:  [x]Patient oriented to person, place and time  []History of frequent falls/loss of balance  Nutritional:  []Change in appetite   []Weight Gain   []Weight Loss  Functional:  []Requires assistance with ADL's

## 2020-03-13 ENCOUNTER — TELEPHONE (OUTPATIENT)
Dept: PAIN MANAGEMENT | Age: 55
End: 2020-03-13

## 2020-03-13 NOTE — TELEPHONE ENCOUNTER
Call placed to patient to follow up after right SI joint, right trochanteric bursa, and right piriformis trigger point injections by Jc Pate on 3/11/20. Patient stated that she has applied ice for the past two days, and she was pretty sore up through last night, but is much better today. She feels the pain has decreased some, and she reports greater mobility with ambulation especially. Patient instructed to call if she has any concerns.

## 2020-04-01 RX ORDER — HYDROCODONE BITARTRATE AND ACETAMINOPHEN 10; 325 MG/1; MG/1
1 TABLET ORAL EVERY 8 HOURS PRN
Qty: 90 TABLET | Refills: 0 | Status: SHIPPED | OUTPATIENT
Start: 2020-04-10 | End: 2020-05-05 | Stop reason: SDUPTHER

## 2020-04-27 ENCOUNTER — TELEPHONE (OUTPATIENT)
Dept: PAIN MANAGEMENT | Age: 55
End: 2020-04-27

## 2020-04-27 RX ORDER — METHYLPREDNISOLONE 4 MG/1
TABLET ORAL
Qty: 1 KIT | Refills: 0 | Status: SHIPPED | OUTPATIENT
Start: 2020-04-27 | End: 2020-07-31 | Stop reason: ALTCHOICE

## 2020-05-05 RX ORDER — HYDROCODONE BITARTRATE AND ACETAMINOPHEN 10; 325 MG/1; MG/1
1 TABLET ORAL EVERY 8 HOURS PRN
Qty: 90 TABLET | Refills: 0 | Status: SHIPPED | OUTPATIENT
Start: 2020-05-10 | End: 2020-05-07 | Stop reason: DRUGHIGH

## 2020-05-07 ENCOUNTER — HOSPITAL ENCOUNTER (OUTPATIENT)
Dept: PAIN MANAGEMENT | Age: 55
Discharge: HOME | End: 2020-05-07
Payer: COMMERCIAL

## 2020-05-07 VITALS
TEMPERATURE: 97.2 F | SYSTOLIC BLOOD PRESSURE: 125 MMHG | WEIGHT: 243 LBS | HEART RATE: 107 BPM | HEIGHT: 65 IN | DIASTOLIC BLOOD PRESSURE: 78 MMHG | BODY MASS INDEX: 40.48 KG/M2 | OXYGEN SATURATION: 98 % | RESPIRATION RATE: 18 BRPM

## 2020-05-07 PROCEDURE — 2500000003 HC RX 250 WO HCPCS

## 2020-05-07 PROCEDURE — 99213 OFFICE O/P EST LOW 20 MIN: CPT

## 2020-05-07 PROCEDURE — 6360000002 HC RX W HCPCS

## 2020-05-07 PROCEDURE — 99214 OFFICE O/P EST MOD 30 MIN: CPT | Performed by: NURSE PRACTITIONER

## 2020-05-07 RX ORDER — TRIAMCINOLONE ACETONIDE 40 MG/ML
80 INJECTION, SUSPENSION INTRA-ARTICULAR; INTRAMUSCULAR ONCE
Status: DISCONTINUED | OUTPATIENT
Start: 2020-05-07 | End: 2020-05-09 | Stop reason: HOSPADM

## 2020-05-07 RX ORDER — LIDOCAINE HYDROCHLORIDE 10 MG/ML
1 INJECTION, SOLUTION EPIDURAL; INFILTRATION; INTRACAUDAL; PERINEURAL ONCE
Status: DISCONTINUED | OUTPATIENT
Start: 2020-05-07 | End: 2020-05-09 | Stop reason: HOSPADM

## 2020-05-07 RX ORDER — HYDROCODONE BITARTRATE AND ACETAMINOPHEN 10; 325 MG/1; MG/1
1 TABLET ORAL EVERY 6 HOURS PRN
Qty: 120 TABLET | Refills: 0 | Status: SHIPPED | OUTPATIENT
Start: 2020-05-07 | End: 2020-06-01 | Stop reason: SDUPTHER

## 2020-05-07 ASSESSMENT — PAIN SCALES - GENERAL: PAINLEVEL_OUTOF10: 8

## 2020-05-07 ASSESSMENT — PAIN DESCRIPTION - PAIN TYPE: TYPE: CHRONIC PAIN

## 2020-05-07 ASSESSMENT — PAIN DESCRIPTION - DIRECTION: RADIATING_TOWARDS: INTO BLE

## 2020-05-07 ASSESSMENT — PAIN DESCRIPTION - ORIENTATION: ORIENTATION: LOWER

## 2020-05-07 ASSESSMENT — PAIN DESCRIPTION - LOCATION: LOCATION: BACK

## 2020-05-07 NOTE — PROGRESS NOTES
Lyndall Carrel  [] Agreement Review  [x] PEG Score Calculated [] PHQ Score Calculated [] ORT Score Calculated    [] Compliance Issues Discussed [] Cognitive Behavior Needs [x] Exercise [] Review of Test [] Financial Issues  [x] Tobacco/Alcohol Use Reviewed [x] Teaching [] New Patient [] Picture Obtained    Physician Plan:  [] Outgoing Referral  [] Pharmacy Consult  [] Test Ordered [x] Prescription Ordered/Changed [] Blood Thinner Request Form  [] Obtained Test Results / Consult Notes  [] UDS due at next visit, verified per EPIC      [] Suspected Physical Abuse or Suicide Risk assessed - IF YES COMPLETE QUESTIONS BELOW    If any of the following questions are answered yes - contact attending physician for referral:    Has been considering harming self to escape stress, pain problems? [] YES  [] NO  Has a suicide plan? [] YES  [] NO  Has attempted suicide in the past?   [] YES  [] NO  Has a close friend or family member who committed suicide?   [] YES  [] NO    Assessment Completed by:  Electronically signed by Varsha Mo RN on 5/7/2020 at 1:26 PM
numbness. Psychiatric/Behavioral: Positive for sleep disturbance. Negative for agitation, self-injury and suicidal ideas. The patient is not nervous/anxious. 14 point ROS negative besides that noted in HPI    Physical Exam:    Vitals:    05/07/20 1340   BP: 125/78   Pulse: 107   Resp: 18   Temp: 97.2 °F (36.2 °C)   TempSrc: Temporal   SpO2: 98%   Weight: 243 lb (110.2 kg)   Height: 5' 5\" (1.651 m)       Body mass index is 40.44 kg/m². Physical Exam  Vitals signs and nursing note reviewed. Constitutional:       General: She is not in acute distress. Appearance: She is well-developed. HENT:      Head: Normocephalic. Right Ear: External ear normal.      Left Ear: External ear normal.      Nose: Nose normal.   Eyes:      Conjunctiva/sclera: Conjunctivae normal.      Pupils: Pupils are equal, round, and reactive to light. Neck:      Vascular: No JVD. Trachea: No tracheal deviation. Cardiovascular:      Rate and Rhythm: Normal rate. Pulmonary:      Effort: Pulmonary effort is normal.   Abdominal:      General: There is no distension. Tenderness: There is no abdominal tenderness. Musculoskeletal:      Lumbar back: She exhibits decreased range of motion, tenderness, pain and spasm. Comments: + slr  Patient states that she is tripping with right foot. She can flex right foot with concentration  She feels like she leans when she ambulates   Skin:     General: Skin is warm and dry. Neurological:      Mental Status: She is alert and oriented to person, place, and time. Cranial Nerves: No cranial nerve deficit. Psychiatric:         Behavior: Behavior normal.         Thought Content: Thought content normal.         Judgment: Judgment normal.         MRI exams received in the past 2 years:  Yes MRI Lumbar Spine       When 12/2019                                              Where [facility]       Imaging on chart: Yes         Imaging records requested:  No     CT exam received

## 2020-05-11 PROBLEM — M54.42 CHRONIC BILATERAL LOW BACK PAIN WITH BILATERAL SCIATICA: Status: ACTIVE | Noted: 2019-10-03

## 2020-05-11 PROBLEM — M54.41 CHRONIC BILATERAL LOW BACK PAIN WITH BILATERAL SCIATICA: Status: ACTIVE | Noted: 2019-10-03

## 2020-05-11 ASSESSMENT — ENCOUNTER SYMPTOMS
BOWEL INCONTINENCE: 0
BACK PAIN: 1
CONSTIPATION: 0

## 2020-05-22 ENCOUNTER — HOSPITAL ENCOUNTER (OUTPATIENT)
Dept: PAIN MANAGEMENT | Age: 55
Discharge: HOME | End: 2020-05-22
Payer: COMMERCIAL

## 2020-05-22 VITALS
HEART RATE: 68 BPM | OXYGEN SATURATION: 96 % | DIASTOLIC BLOOD PRESSURE: 79 MMHG | SYSTOLIC BLOOD PRESSURE: 121 MMHG | TEMPERATURE: 96.2 F | RESPIRATION RATE: 18 BRPM

## 2020-05-22 PROCEDURE — 6360000002 HC RX W HCPCS

## 2020-05-22 PROCEDURE — 2580000003 HC RX 258

## 2020-05-22 PROCEDURE — 62323 NJX INTERLAMINAR LMBR/SAC: CPT

## 2020-05-22 PROCEDURE — 2500000003 HC RX 250 WO HCPCS

## 2020-05-22 RX ORDER — SODIUM CHLORIDE 9 MG/ML
INJECTION INTRAVENOUS
Status: COMPLETED | OUTPATIENT
Start: 2020-05-22 | End: 2020-05-22

## 2020-05-22 RX ORDER — LIDOCAINE HYDROCHLORIDE 10 MG/ML
INJECTION, SOLUTION EPIDURAL; INFILTRATION; INTRACAUDAL; PERINEURAL
Status: COMPLETED | OUTPATIENT
Start: 2020-05-22 | End: 2020-05-22

## 2020-05-22 RX ORDER — METHYLPREDNISOLONE ACETATE 80 MG/ML
INJECTION, SUSPENSION INTRA-ARTICULAR; INTRALESIONAL; INTRAMUSCULAR; SOFT TISSUE
Status: COMPLETED | OUTPATIENT
Start: 2020-05-22 | End: 2020-05-22

## 2020-05-22 RX ADMIN — SODIUM CHLORIDE 5 ML: 9 INJECTION INTRAVENOUS at 09:03

## 2020-05-22 RX ADMIN — LIDOCAINE HYDROCHLORIDE 5 ML: 10 INJECTION, SOLUTION EPIDURAL; INFILTRATION; INTRACAUDAL; PERINEURAL at 09:03

## 2020-05-22 RX ADMIN — METHYLPREDNISOLONE ACETATE 80 MG: 80 INJECTION, SUSPENSION INTRA-ARTICULAR; INTRALESIONAL; INTRAMUSCULAR; SOFT TISSUE at 09:04

## 2020-05-22 ASSESSMENT — PAIN - FUNCTIONAL ASSESSMENT
PAIN_FUNCTIONAL_ASSESSMENT: 0-10
PAIN_FUNCTIONAL_ASSESSMENT: 0-10

## 2020-05-27 ENCOUNTER — TELEPHONE (OUTPATIENT)
Dept: PAIN MANAGEMENT | Age: 55
End: 2020-05-27

## 2020-05-27 NOTE — TELEPHONE ENCOUNTER
I tried to call this patient as a follow up from their injection and they did not answer and there was not a way to leave a voicemail.

## 2020-06-02 ENCOUNTER — TELEPHONE (OUTPATIENT)
Dept: NEUROSURGERY | Age: 55
End: 2020-06-02

## 2020-06-02 NOTE — TELEPHONE ENCOUNTER
Patient called stating she would like to discuss options for her back pain w/someone. She has seen Dr Marilu Walsh in the past, at that time she felt pain was well managed non-surgically and opted to stick w/pain management. That was back in 3/2020, patient has been to pain management and states she has received their recommended treatments and most recently underwent another epidural about 1 week ago. She states the treatments are no longer helping, she also states she was advised that they have basically exhausted all of her non-surgical options. She would like to discuss coming back to see Dr Marilu Walsh again for evaluation and recommendations.  Please return her call at [phone]

## 2020-06-03 NOTE — TELEPHONE ENCOUNTER
Pt left message stating that she has spoke to pain management and they feel they are out of options for her. She is still complaining of muscle spasms, left knee pain, sciatica nerve pain, and sacral/lumbar pain that starts from the small of her back and radiates downwards. Pt wants to know if surgery is an option.

## 2020-06-04 NOTE — TELEPHONE ENCOUNTER
Surgery may be an option for her. She has a spondylolisthesis at L3-4 that moves some with flexion. I will talk to her about everything in July.

## 2020-06-05 RX ORDER — METHOCARBAMOL 750 MG/1
750 TABLET, FILM COATED ORAL 3 TIMES DAILY
Qty: 90 TABLET | Refills: 2 | Status: SHIPPED | OUTPATIENT
Start: 2020-06-08 | End: 2020-07-29 | Stop reason: SDUPTHER

## 2020-06-05 RX ORDER — HYDROCODONE BITARTRATE AND ACETAMINOPHEN 10; 325 MG/1; MG/1
1 TABLET ORAL EVERY 6 HOURS PRN
Qty: 120 TABLET | Refills: 0 | Status: SHIPPED | OUTPATIENT
Start: 2020-06-06 | End: 2020-06-29 | Stop reason: SDUPTHER

## 2020-06-08 ENCOUNTER — TELEPHONE (OUTPATIENT)
Dept: PAIN MANAGEMENT | Age: 55
End: 2020-06-08

## 2020-06-29 RX ORDER — HYDROCODONE BITARTRATE AND ACETAMINOPHEN 10; 325 MG/1; MG/1
1 TABLET ORAL EVERY 6 HOURS PRN
Qty: 120 TABLET | Refills: 0 | Status: SHIPPED | OUTPATIENT
Start: 2020-07-06 | End: 2020-11-10 | Stop reason: ALTCHOICE

## 2020-07-02 ENCOUNTER — TELEPHONE (OUTPATIENT)
Dept: PAIN MANAGEMENT | Age: 55
End: 2020-07-02

## 2020-07-02 NOTE — TELEPHONE ENCOUNTER
Call transferred to nurse line from . Patient is asking for increase in her Robaxin dose. Sixto Velarde is currently out of the office. Have sent message to see if this is possible.

## 2020-07-06 ENCOUNTER — TELEPHONE (OUTPATIENT)
Dept: PAIN MANAGEMENT | Age: 55
End: 2020-07-06

## 2020-07-06 RX ORDER — PREGABALIN 25 MG/1
25 CAPSULE ORAL 3 TIMES DAILY
Qty: 90 CAPSULE | Refills: 1 | Status: SHIPPED | OUTPATIENT
Start: 2020-07-06 | End: 2020-07-23 | Stop reason: SDUPTHER

## 2020-07-06 NOTE — TELEPHONE ENCOUNTER
Spoke with Chastity Mendez and called patient to instruct her on Lyrica script sent to pharmacy. Let her know to start slowly with the Lyrica by taking one 25 mg tablet at hs for three days. If no problems with this, may increase to 25 mg BID for three days. If no problems after that may advance to 25 mg TID. Patient verbalized understanding and is to have her neurosurgery appointment on 7/16.

## 2020-07-16 ENCOUNTER — OFFICE VISIT (OUTPATIENT)
Dept: NEUROSURGERY | Age: 55
End: 2020-07-16
Payer: COMMERCIAL

## 2020-07-16 VITALS
SYSTOLIC BLOOD PRESSURE: 112 MMHG | HEART RATE: 74 BPM | HEIGHT: 65 IN | WEIGHT: 232 LBS | DIASTOLIC BLOOD PRESSURE: 70 MMHG | BODY MASS INDEX: 38.65 KG/M2 | OXYGEN SATURATION: 98 %

## 2020-07-16 PROCEDURE — 99215 OFFICE O/P EST HI 40 MIN: CPT | Performed by: NEUROLOGICAL SURGERY

## 2020-07-16 RX ORDER — SODIUM CHLORIDE 0.9 % (FLUSH) 0.9 %
10 SYRINGE (ML) INJECTION PRN
Status: CANCELLED | OUTPATIENT
Start: 2020-07-16

## 2020-07-16 RX ORDER — SODIUM CHLORIDE 0.9 % (FLUSH) 0.9 %
10 SYRINGE (ML) INJECTION EVERY 12 HOURS SCHEDULED
Status: CANCELLED | OUTPATIENT
Start: 2020-07-16

## 2020-07-16 RX ORDER — ACETAMINOPHEN 325 MG/1
325 TABLET ORAL EVERY 6 HOURS PRN
COMMUNITY

## 2020-07-16 NOTE — H&P (VIEW-ONLY)
[facility]  H&P      Chief Complaint   Patient presents with    Follow-up    Lower Back Pain       7/16/2020:  Mrs. Sandi Bonilla returns to the clinic today due to an increase in her pain. She states she continues to have severe low back and bilateral lower extremity pains. She is having lower extremity spasms. She has now had multiple TALYA, trigger point injections and various other treatments by the pain management team.  She is ready to move forward with surgery. HISTORY OF PRESENT ILLNESS:    Gisella Andersen is a 62 y.o. female who presents with a long standing history of low back pain. The pain does radiate into the right buttocks and typically stops at the buttock, however, she has started having pain behind the knees. Her pain is mostly located in the low back. The patient denies numbness. She does mention having a history of \"sciatic\" pain that typically goes away with rest and medication, however, this episode has not subsided. She reports that any repetitive movements such as sweeping exacerbates her pain. She will often \"catch\" her right foot on steps or stumble over her toe. She has been seeing [facility] and has received LESI of L4-5, right greater trochanteric bursa and right piriformis injections. She mentions good success with the bursa injections. Her pain is worsened when going from a seated to standing position. Her pain is worsened with walking. Her pain is worsened when lying flat. Overall, indicative that the patient does have a mechanical nature to their pain. She states that 60% of her pain is located in the back and 40% is right buttock pain. The patient has underwent a non-operative treatment course that has included:  NSAIDs (diclofenac patch)  Muscle Relaxers (Robaxin)  Opiates (Norco - approx.  10 years)  Physical Therapy with core strengthening (multiple times)  Epidural Steroid Injections (Dr. Laine TAVAREZ)  Massage Therapy      Of note she does use tobacco and does take blood thinning medications (ASA). Past Medical History:   Diagnosis Date    Anxiety     Asthma     Colon polyps     DDD (degenerative disc disease), cervical     Depression     Essential hypertension 10/19/2016    GERD (gastroesophageal reflux disease)     Hiatal hernia     HTN (hypertension)     Shoulder pain     Tobacco abuse 10/19/2016       Past Surgical History:   Procedure Laterality Date    GALLBLADDER SURGERY      HERNIA REPAIR      LAP BAND         Current Outpatient Medications   Medication Sig Dispense Refill    acetaminophen (TYLENOL) 325 MG tablet Take 650 mg by mouth every 6 hours as needed for Pain      pregabalin (LYRICA) 25 MG capsule Take 1 capsule by mouth 3 times daily for 60 days. 90 capsule 1    HYDROcodone-acetaminophen (NORCO)  MG per tablet Take 1 tablet by mouth every 6 hours as needed for Pain for up to 30 days. May fill 7/6/20 120 tablet 0    methocarbamol (ROBAXIN-750) 750 MG tablet Take 1 tablet by mouth 3 times daily 90 tablet 2    NONFORMULARY HEAL CBD capsules      cetaphil (CETAPHIL) cream [place] pain cream 1 Tube 5    aspirin 325 MG tablet Take 325 mg by mouth daily      diphenhydrAMINE HCl (BENADRYL PO) Take 75 mg by mouth daily      busPIRone (BUSPAR) 5 MG tablet Take 5 mg by mouth 2 times daily as needed      clotrimazole (LOTRIMIN) 1 % cream Apply topically 2 times daily Apply topically 2 times daily.  diclofenac sodium 1 % GEL Apply 2 g topically 4 times daily as needed for Pain      DICLOFENAC PO Take 75 mg by mouth 2 times daily      famotidine (PEPCID) 20 MG tablet Take 20 mg by mouth 2 times daily      ibuprofen (ADVIL;MOTRIN) 200 MG tablet Take 200 mg by mouth every 8 hours as needed for Pain      mupirocin (BACTROBAN) 2 % ointment Apply topically as needed Apply topically 3 times daily.       diclofenac (FLECTOR) 1.3 % patch Place 1 patch onto the skin 2 times daily      hydrochlorothiazide (MICROZIDE) 12.5 MG capsule Take 25 mg by mouth daily       lisinopril (PRINIVIL;ZESTRIL) 20 MG tablet Take 10 mg by mouth daily       nystatin (MYCOSTATIN) 531311 UNIT/GM cream Apply topically 2 times daily Apply topically 2 times daily.  ALPRAZolam (XANAX) 1 MG tablet Take 1 mg by mouth nightly as needed for Sleep      Clobetasol Propionate Emulsion 0.05 % FOAM       methylPREDNISolone (MEDROL DOSEPACK) 4 MG tablet Take by mouth. (Patient not taking: Reported on 7/16/2020) 1 kit 0     No current facility-administered medications for this visit. Allergies:  Citalopram; Daypro [oxaprozin]; Demerol hcl [meperidine]; Doxepin; Elavil [amitriptyline hcl]; Erythromycin; Gabapentin; Meloxicam; Penicillins; Percocet [oxycodone-acetaminophen]; Soma [carisoprodol]; and Statins    Social History:   Social History     Tobacco Use   Smoking Status Current Every Day Smoker    Start date: 10/19/1988   Smokeless Tobacco Never Used     Social History     Substance and Sexual Activity   Alcohol Use Not Currently         Family History:   Family History   Problem Relation Age of Onset    Parkinsonism Mother     Alzheimer's Disease Mother     Stroke Father     Substance Abuse Brother        REVIEW OF SYSTEMS:  Constitutional: Negative. HENT: Negative. Eyes: Negative. Respiratory: Positive for wheezing. Cardiovascular: Negative. Gastrointestinal: Positive for constipation. Genitourinary: Negative. Musculoskeletal: Positive for back pain, joint pain and myalgias. Skin: Negative. Neurological: Negative. Endo/Heme/Allergies: Negative. Psychiatric/Behavioral: The patient is nervous/anxious. PHYSICAL EXAM:  Vitals:    07/16/20 1112   BP: 112/70   Pulse: 74   SpO2: 98%     Constitutional: appears well-developed and well-nourished.    Eyes - conjunctiva normal.  Pupils react to light  Ear, nose, throat - hearing intact to finger rub, No scars, masses, or lesions over external nose or ears, no atrophy oftongue  Neck- symmetric, no masses noted, no jugular vein distension  Respiration- chest wall appears symmetric, good expansion, normal effort without use of accessory muscles  Musculoskeletal - no significant wasting of muscles noted, no bony deformities, gait no gross ataxia  Extremities- no clubbing, cyanosis oredema  Skin - warm, dry, and intact. No rash, erythema, or pallor. Psychiatric - mood, affect, and behavior appear normal.     Neurologic Examination  Awake, Alert and oriented x 4  Normal speech pattern, following commands    Motor:  RIGHT:    iliopsoas 5/5    knee flexor 5/5    knee extension 5/5    EHL/dorsiflexion 5/5    plantar flexion 5/5    LEFT:      iliopsoas 5/5    knee flexor 5/5    knee extension 5/5    EHL/dorsiflexion 5/5    plantar flexion 5/5    No deficits to light touch or pinprick sensation  Reflexes are 2+ and symmetric  No myofacial tenderness to palpation  Slight antalgic Gait pattern      DATA and IMAGING:    Nursing/pcp notes, imaging, labs, and vitals reviewed. PT,OT and/or speech notes reviewed    No results found for: WBC, HGB, HCT, MCV, PLTNo results found for: NA, K, CL, CO2, BUN, CREATININE, GLUCOSE, CALCIUM, PROT, LABALBU, BILITOT, ALKPHOS, AST, ALT, LABGLOM, GFRAA, AGRATIO, GLOBNo results found for: INR, PROTIME    EXAMINATION:  MRI LUMBAR SPINE WO CONTRAST  12/2/2019 11:57 AM   HISTORY: Low back pain with right leg numbness. SI joint injection and   October 2019. COMPARISON: No comparison study. TECHNIQUE: Multiplanar imaging was performed. FINDINGS: Joycea Lighter is a hemangioma in the T12 vertebral body versus   focal fatty infiltration along the superior endplate. No other   significant bone marrow signal abnormality is seen. The inferior tip   of the conus is at the superior endplate of L1. The visualized lower   thoracic spinal cord demonstrates no signal abnormality. At L5-S1, there is severe disc narrowing. There is a small central and   right paracentral disc osteophyte complex producing minimal dural sac   compression. There is mild facet arthropathy. There is no significant   central spinal canal stenosis. There is minimal inferior recess   foraminal narrowing due to endplate spurring. At L4-5, there is severe disc narrowing and disc degeneration. There   is disc bulging and endplate spurring. There is a central and left   paracentral disc osteophyte complex producing dural sac compression. There is moderate facet arthropathy with thickening of ligamentum   flavum. There is moderate to severe narrowing of the central spinal   canal due to the spurring. There is mild inferior recess foraminal   narrowing bilaterally left greater than right at this level. At L3-4, there is mild to moderate disc narrowing. There is moderate   disc bulging. There is moderate to severe facet arthropathy with   thickening of ligamentum flavum. These factors produce moderate to   severe central spinal canal narrowing. There is only mild foraminal   stenosis bilaterally. At L2-3, there is mild disc bulging. There is mild to moderate facet   arthropathy and thickening of ligamentum flavum. These factors produce   mild central spinal canal narrowing that does not appear to be   significant. There is minimal inferior recess foraminal narrowing   bilaterally due to disc bulging. At L1-2, the disc is fairly well maintained in height. There is no   disc herniation or spinal stenosis. There is mild facet arthropathy. At T11-12 and T12-L1, the disc spaces are fairly well maintained with   minimal disc bulging. There is no spinal or foraminal stenosis on the   sagittal images. There were no axial images obtained.       Impression   Degenerative changes are noted at several lumbar disc   levels. Please see the above report.    Signed by Dr Estanislado Boxer on 12/2/2019 12:02 PM   I have personally reviewed these images and my interpretation sciatica  M54.41     G89.29    4. Lumbar disc herniation  M51.26    5. Right buttock pain  M79.18    6. Spinal stenosis of lumbar region, unspecified whether neurogenic claudication present  M48.061    7. Pre-op chest exam  Z01.811 XR CHEST STANDARD (2 VW)       PLAN:  Mrs. Karen Munguia and I had a long discussion. She has attempted an extensive array of non-operative treatments. She continues to have severe back and leg pain. She is ready to move forward with an operation. She will need a TLIF 3-4, 4-5 using minimally invasive techniques. We will use the solid [place] with bolsters. We discussed risks, complications and expectations, including but not limited to infection, paralysis, bowel and bladder dysfunction, need for revision procedure, persistent pain, spinal fluid leak, stroke and death. In addition, the benefits of the surgery were thoroughly discussed and the patient demonstrated a deep understanding. The patient wishes to proceed with surgical intervention.      Sherren Settle, DO

## 2020-07-16 NOTE — PROGRESS NOTES
[facility]  Office Visit      Chief Complaint   Patient presents with    Follow-up    Lower Back Pain       7/16/2020:  Mrs. Lydia Waters returns to the clinic today due to an increase in her pain. She states she continues to have severe low back and bilateral lower extremity pains. She is having lower extremity spasms. She has now had multiple TALYA, trigger point injections and various other treatments by the pain management team.  She is ready to move forward with surgery. HISTORY OF PRESENT ILLNESS:    April Sanders is a 62 y.o. female who presents with a long standing history of low back pain. The pain does radiate into the right buttocks and typically stops at the buttock, however, she has started having pain behind the knees. Her pain is mostly located in the low back. The patient denies numbness. She does mention having a history of \"sciatic\" pain that typically goes away with rest and medication, however, this episode has not subsided. She reports that any repetitive movements such as sweeping exacerbates her pain. She will often \"catch\" her right foot on steps or stumble over her toe. She has been seeing [facility] and has received LESI of L4-5, right greater trochanteric bursa and right piriformis injections. She mentions good success with the bursa injections. Her pain is worsened when going from a seated to standing position. Her pain is worsened with walking. Her pain is worsened when lying flat. Overall, indicative that the patient does have a mechanical nature to their pain. She states that 60% of her pain is located in the back and 40% is right buttock pain. The patient has underwent a non-operative treatment course that has included:  NSAIDs (diclofenac patch)  Muscle Relaxers (Robaxin)  Opiates (Norco - approx.  10 years)  Physical Therapy with core strengthening (multiple times)  Epidural Steroid Injections (Dr. Keena Asencio DAYSI)  Massage Therapy      Of note she does use tobacco and does take blood thinning medications (ASA). Past Medical History:   Diagnosis Date    Anxiety     Asthma     Colon polyps     DDD (degenerative disc disease), cervical     Depression     Essential hypertension 10/19/2016    GERD (gastroesophageal reflux disease)     Hiatal hernia     HTN (hypertension)     Shoulder pain     Tobacco abuse 10/19/2016       Past Surgical History:   Procedure Laterality Date    GALLBLADDER SURGERY      HERNIA REPAIR      LAP BAND         Current Outpatient Medications   Medication Sig Dispense Refill    acetaminophen (TYLENOL) 325 MG tablet Take 650 mg by mouth every 6 hours as needed for Pain      pregabalin (LYRICA) 25 MG capsule Take 1 capsule by mouth 3 times daily for 60 days. 90 capsule 1    HYDROcodone-acetaminophen (NORCO)  MG per tablet Take 1 tablet by mouth every 6 hours as needed for Pain for up to 30 days. May fill 7/6/20 120 tablet 0    methocarbamol (ROBAXIN-750) 750 MG tablet Take 1 tablet by mouth 3 times daily 90 tablet 2    NONFORMULARY HEAL CBD capsules      cetaphil (CETAPHIL) cream [place] pain cream 1 Tube 5    aspirin 325 MG tablet Take 325 mg by mouth daily      diphenhydrAMINE HCl (BENADRYL PO) Take 75 mg by mouth daily      busPIRone (BUSPAR) 5 MG tablet Take 5 mg by mouth 2 times daily as needed      clotrimazole (LOTRIMIN) 1 % cream Apply topically 2 times daily Apply topically 2 times daily.  diclofenac sodium 1 % GEL Apply 2 g topically 4 times daily as needed for Pain      DICLOFENAC PO Take 75 mg by mouth 2 times daily      famotidine (PEPCID) 20 MG tablet Take 20 mg by mouth 2 times daily      ibuprofen (ADVIL;MOTRIN) 200 MG tablet Take 200 mg by mouth every 8 hours as needed for Pain      mupirocin (BACTROBAN) 2 % ointment Apply topically as needed Apply topically 3 times daily.       diclofenac (FLECTOR) 1.3 % patch Place 1 patch onto the skin 2 times daily      hydrochlorothiazide (MICROZIDE) 12.5 MG capsule Take 25 mg by mouth daily       lisinopril (PRINIVIL;ZESTRIL) 20 MG tablet Take 10 mg by mouth daily       nystatin (MYCOSTATIN) 671958 UNIT/GM cream Apply topically 2 times daily Apply topically 2 times daily.  ALPRAZolam (XANAX) 1 MG tablet Take 1 mg by mouth nightly as needed for Sleep      Clobetasol Propionate Emulsion 0.05 % FOAM       methylPREDNISolone (MEDROL DOSEPACK) 4 MG tablet Take by mouth. (Patient not taking: Reported on 7/16/2020) 1 kit 0     No current facility-administered medications for this visit. Allergies:  Citalopram; Daypro [oxaprozin]; Demerol hcl [meperidine]; Doxepin; Elavil [amitriptyline hcl]; Erythromycin; Gabapentin; Meloxicam; Penicillins; Percocet [oxycodone-acetaminophen]; Soma [carisoprodol]; and Statins    Social History:   Social History     Tobacco Use   Smoking Status Current Every Day Smoker    Start date: 10/19/1988   Smokeless Tobacco Never Used     Social History     Substance and Sexual Activity   Alcohol Use Not Currently         Family History:   Family History   Problem Relation Age of Onset    Parkinsonism Mother     Alzheimer's Disease Mother     Stroke Father     Substance Abuse Brother        REVIEW OF SYSTEMS:  Constitutional: Negative. HENT: Negative. Eyes: Negative. Respiratory: Positive for wheezing. Cardiovascular: Negative. Gastrointestinal: Positive for constipation. Genitourinary: Negative. Musculoskeletal: Positive for back pain, joint pain and myalgias. Skin: Negative. Neurological: Negative. Endo/Heme/Allergies: Negative. Psychiatric/Behavioral: The patient is nervous/anxious. PHYSICAL EXAM:  Vitals:    07/16/20 1112   BP: 112/70   Pulse: 74   SpO2: 98%     Constitutional: appears well-developed and well-nourished.    Eyes - conjunctiva normal.  Pupils react to light  Ear, nose, throat - hearing intact to finger rub, No scars, masses, or lesions over external nose or ears, no atrophy oftongue  Neck- symmetric, no masses noted, no jugular vein distension  Respiration- chest wall appears symmetric, good expansion, normal effort without use of accessory muscles  Musculoskeletal - no significant wasting of muscles noted, no bony deformities, gait no gross ataxia  Extremities- no clubbing, cyanosis oredema  Skin - warm, dry, and intact. No rash, erythema, or pallor. Psychiatric - mood, affect, and behavior appear normal.     Neurologic Examination  Awake, Alert and oriented x 4  Normal speech pattern, following commands    Motor:  RIGHT:    iliopsoas 5/5    knee flexor 5/5    knee extension 5/5    EHL/dorsiflexion 5/5    plantar flexion 5/5    LEFT:      iliopsoas 5/5    knee flexor 5/5    knee extension 5/5    EHL/dorsiflexion 5/5    plantar flexion 5/5    No deficits to light touch or pinprick sensation  Reflexes are 2+ and symmetric  No myofacial tenderness to palpation  Slight antalgic Gait pattern      DATA and IMAGING:    Nursing/pcp notes, imaging, labs, and vitals reviewed. PT,OT and/or speech notes reviewed    No results found for: WBC, HGB, HCT, MCV, PLTNo results found for: NA, K, CL, CO2, BUN, CREATININE, GLUCOSE, CALCIUM, PROT, LABALBU, BILITOT, ALKPHOS, AST, ALT, LABGLOM, GFRAA, AGRATIO, GLOBNo results found for: INR, PROTIME    EXAMINATION:  MRI LUMBAR SPINE WO CONTRAST  12/2/2019 11:57 AM   HISTORY: Low back pain with right leg numbness. SI joint injection and   October 2019. COMPARISON: No comparison study. TECHNIQUE: Multiplanar imaging was performed. FINDINGS: Marilyn Lighter is a hemangioma in the T12 vertebral body versus   focal fatty infiltration along the superior endplate. No other   significant bone marrow signal abnormality is seen. The inferior tip   of the conus is at the superior endplate of L1. The visualized lower   thoracic spinal cord demonstrates no signal abnormality.    At L5-S1, there is severe disc narrowing. There is a small central and   right paracentral disc osteophyte complex producing minimal dural sac   compression. There is mild facet arthropathy. There is no significant   central spinal canal stenosis. There is minimal inferior recess   foraminal narrowing due to endplate spurring. At L4-5, there is severe disc narrowing and disc degeneration. There   is disc bulging and endplate spurring. There is a central and left   paracentral disc osteophyte complex producing dural sac compression. There is moderate facet arthropathy with thickening of ligamentum   flavum. There is moderate to severe narrowing of the central spinal   canal due to the spurring. There is mild inferior recess foraminal   narrowing bilaterally left greater than right at this level. At L3-4, there is mild to moderate disc narrowing. There is moderate   disc bulging. There is moderate to severe facet arthropathy with   thickening of ligamentum flavum. These factors produce moderate to   severe central spinal canal narrowing. There is only mild foraminal   stenosis bilaterally. At L2-3, there is mild disc bulging. There is mild to moderate facet   arthropathy and thickening of ligamentum flavum. These factors produce   mild central spinal canal narrowing that does not appear to be   significant. There is minimal inferior recess foraminal narrowing   bilaterally due to disc bulging. At L1-2, the disc is fairly well maintained in height. There is no   disc herniation or spinal stenosis. There is mild facet arthropathy. At T11-12 and T12-L1, the disc spaces are fairly well maintained with   minimal disc bulging. There is no spinal or foraminal stenosis on the   sagittal images. There were no axial images obtained.       Impression   Degenerative changes are noted at several lumbar disc   levels. Please see the above report.    Signed by Dr Yumiko Chaudhari on 12/2/2019 12:02 PM   I have personally reviewed these images and my interpretation is: There is DDD L4-S1  L3-4 there is an anterolisthesis that measures 4 mm and results in mild to moderate canal stenosis  L4-5 There a disc herniation/osteophyte that results in severe left lateral recess stenosis that abuts the left L5 nerve root      EXAM: XR LUMBAR SPINE (MIN 4 VIEWS) -- 10/3/2019 12:56 PM   HISTORY: 53 years, Female, chronic bilateral low back pain without   sciatica   COMPARISON: None   TECHNIQUE:  3 images.  Lateral neutral, flexion, extension views   FINDINGS:     Limited evaluation due to technique. Assuming there are 5 lumbar type vertebral bodies, there is 6 mm   anterolisthesis L3 on L4, which increases to 8 mm anterolisthesis with   flexion. There is 6 mm anterolisthesis with extension. Disc height loss greatest at L4-5 with apparent vacuum disc   phenomenon. Facet hypertrophy. Surgical clips in the abdomen, most commonly due to cholecystectomy. Gastric lap band, incompletely assessed on this exam.       Impression   1. Limited evaluation due to technique. 2. There appears to be 6 mm anterolisthesis of L3 on L4 (assuming   conventional anatomy with 5 lumbar type vertebral bodies), which   increases to 8 mm anterolisthesis with flexion. 3. Disc height loss greatest at L4-5. Signed by Dr Iglesia Sullivan on 10/3/2019 3:18 PM   I have personally reviewed the images and my interpretation is: There a spondylolisthesis at L3-4 that measures 6mm on extension and 8 mm on flexion      ASSESSMENT:    Michelle Brennan is a 62 y.o. female with a spondylolisthesis of L3-4 with some mild to moderate instability and some narrowing at L3-4 and L4-5. She complains of low back and RLE pain. ICD-10-CM    1. Spondylolisthesis at L3-L4 level  M43.16 APTT     CBC     Comprehensive Metabolic Panel     EKG 12 Lead     Protime-INR     Type and Screen     Urinalysis Reflex to Culture   2. Stenosis of lateral recess of lumbar spine  M48.061    3.  Chronic midline low back pain with right-sided sciatica  M54.41     G89.29    4. Lumbar disc herniation  M51.26    5. Right buttock pain  M79.18    6. Spinal stenosis of lumbar region, unspecified whether neurogenic claudication present  M48.061    7. Pre-op chest exam  Z01.811 XR CHEST STANDARD (2 VW)       PLAN:  Mrs. Danette Monroe and I had a long discussion. She has attempted an extensive array of non-operative treatments. She continues to have severe back and leg pain. She is ready to move forward with an operation. She will need a TLIF 3-4, 4-5 using minimally invasive techniques. We will use the solid Antonio with bolsters. We discussed risks, complications and expectations, including but not limited to infection, paralysis, bowel and bladder dysfunction, need for revision procedure, persistent pain, spinal fluid leak, stroke and death. In addition, the benefits of the surgery were thoroughly discussed and the patient demonstrated a deep understanding. The patient wishes to proceed with surgical intervention. We will schedule for surgery in the near future.     Thea Sorto,

## 2020-07-16 NOTE — H&P
[facility]  H&P      Chief Complaint   Patient presents with    Follow-up    Lower Back Pain       7/16/2020:  Mrs. Xiomara Khan returns to the clinic today due to an increase in her pain. She states she continues to have severe low back and bilateral lower extremity pains. She is having lower extremity spasms. She has now had multiple TALYA, trigger point injections and various other treatments by the pain management team.  She is ready to move forward with surgery. HISTORY OF PRESENT ILLNESS:    Dotty Malhotra is a 62 y.o. female who presents with a long standing history of low back pain. The pain does radiate into the right buttocks and typically stops at the buttock, however, she has started having pain behind the knees. Her pain is mostly located in the low back. The patient denies numbness. She does mention having a history of \"sciatic\" pain that typically goes away with rest and medication, however, this episode has not subsided. She reports that any repetitive movements such as sweeping exacerbates her pain. She will often \"catch\" her right foot on steps or stumble over her toe. She has been seeing [facility] and has received LESI of L4-5, right greater trochanteric bursa and right piriformis injections. She mentions good success with the bursa injections. Her pain is worsened when going from a seated to standing position. Her pain is worsened with walking. Her pain is worsened when lying flat. Overall, indicative that the patient does have a mechanical nature to their pain. She states that 60% of her pain is located in the back and 40% is right buttock pain. The patient has underwent a non-operative treatment course that has included:  NSAIDs (diclofenac patch)  Muscle Relaxers (Robaxin)  Opiates (Norco - approx.  10 years)  Physical Therapy with core strengthening (multiple times)  Epidural Steroid Injections (Dr. Claudeen Challenger APRN)  Massage Therapy      Of note she does use tobacco and does take blood thinning medications (ASA). Past Medical History:   Diagnosis Date    Anxiety     Asthma     Colon polyps     DDD (degenerative disc disease), cervical     Depression     Essential hypertension 10/19/2016    GERD (gastroesophageal reflux disease)     Hiatal hernia     HTN (hypertension)     Shoulder pain     Tobacco abuse 10/19/2016       Past Surgical History:   Procedure Laterality Date    GALLBLADDER SURGERY      HERNIA REPAIR      LAP BAND         Current Outpatient Medications   Medication Sig Dispense Refill    acetaminophen (TYLENOL) 325 MG tablet Take 650 mg by mouth every 6 hours as needed for Pain      pregabalin (LYRICA) 25 MG capsule Take 1 capsule by mouth 3 times daily for 60 days. 90 capsule 1    HYDROcodone-acetaminophen (NORCO)  MG per tablet Take 1 tablet by mouth every 6 hours as needed for Pain for up to 30 days. May fill 7/6/20 120 tablet 0    methocarbamol (ROBAXIN-750) 750 MG tablet Take 1 tablet by mouth 3 times daily 90 tablet 2    NONFORMULARY HEAL CBD capsules      cetaphil (CETAPHIL) cream [place] pain cream 1 Tube 5    aspirin 325 MG tablet Take 325 mg by mouth daily      diphenhydrAMINE HCl (BENADRYL PO) Take 75 mg by mouth daily      busPIRone (BUSPAR) 5 MG tablet Take 5 mg by mouth 2 times daily as needed      clotrimazole (LOTRIMIN) 1 % cream Apply topically 2 times daily Apply topically 2 times daily.  diclofenac sodium 1 % GEL Apply 2 g topically 4 times daily as needed for Pain      DICLOFENAC PO Take 75 mg by mouth 2 times daily      famotidine (PEPCID) 20 MG tablet Take 20 mg by mouth 2 times daily      ibuprofen (ADVIL;MOTRIN) 200 MG tablet Take 200 mg by mouth every 8 hours as needed for Pain      mupirocin (BACTROBAN) 2 % ointment Apply topically as needed Apply topically 3 times daily.       diclofenac (FLECTOR) 1.3 % patch Place 1 patch onto the skin 2 times daily      hydrochlorothiazide (MICROZIDE) 12.5 MG capsule Take 25 mg by mouth daily       lisinopril (PRINIVIL;ZESTRIL) 20 MG tablet Take 10 mg by mouth daily       nystatin (MYCOSTATIN) [id number] UNIT/GM cream Apply topically 2 times daily Apply topically 2 times daily.  ALPRAZolam (XANAX) 1 MG tablet Take 1 mg by mouth nightly as needed for Sleep      Clobetasol Propionate Emulsion 0.05 % FOAM       methylPREDNISolone (MEDROL DOSEPACK) 4 MG tablet Take by mouth. (Patient not taking: Reported on 7/16/2020) 1 kit 0     No current facility-administered medications for this visit. Allergies:  Citalopram; Daypro [oxaprozin]; Demerol hcl [meperidine]; Doxepin; Elavil [amitriptyline hcl]; Erythromycin; Gabapentin; Meloxicam; Penicillins; Percocet [oxycodone-acetaminophen]; Soma [carisoprodol]; and Statins    Social History:   Social History     Tobacco Use   Smoking Status Current Every Day Smoker    Start date: 10/19/1988   Smokeless Tobacco Never Used     Social History     Substance and Sexual Activity   Alcohol Use Not Currently         Family History:   Family History   Problem Relation Age of Onset    Parkinsonism Mother     Alzheimer's Disease Mother     Stroke Father     Substance Abuse Brother        REVIEW OF SYSTEMS:  Constitutional: Negative. HENT: Negative. Eyes: Negative. Respiratory: Positive for wheezing. Cardiovascular: Negative. Gastrointestinal: Positive for constipation. Genitourinary: Negative. Musculoskeletal: Positive for back pain, joint pain and myalgias. Skin: Negative. Neurological: Negative. Endo/Heme/Allergies: Negative. Psychiatric/Behavioral: The patient is nervous/anxious. PHYSICAL EXAM:  Vitals:    07/16/20 1112   BP: 112/70   Pulse: 74   SpO2: 98%     Constitutional: appears well-developed and well-nourished.    Eyes - conjunctiva normal.  Pupils react to light  Ear, nose, throat - hearing intact to finger rub, No scars, masses, or lesions over external nose or ears, no atrophy oftongue  Neck- symmetric, no masses noted, no jugular vein distension  Respiration- chest wall appears symmetric, good expansion, normal effort without use of accessory muscles  Musculoskeletal - no significant wasting of muscles noted, no bony deformities, gait no gross ataxia  Extremities- no clubbing, cyanosis oredema  Skin - warm, dry, and intact. No rash, erythema, or pallor. Psychiatric - mood, affect, and behavior appear normal.     Neurologic Examination  Awake, Alert and oriented x 4  Normal speech pattern, following commands    Motor:  RIGHT:    iliopsoas 5/5    knee flexor 5/5    knee extension 5/5    EHL/dorsiflexion 5/5    plantar flexion 5/5    LEFT:      iliopsoas 5/5    knee flexor 5/5    knee extension 5/5    EHL/dorsiflexion 5/5    plantar flexion 5/5    No deficits to light touch or pinprick sensation  Reflexes are 2+ and symmetric  No myofacial tenderness to palpation  Slight antalgic Gait pattern      DATA and IMAGING:    Nursing/pcp notes, imaging, labs, and vitals reviewed. PT,OT and/or speech notes reviewed    No results found for: WBC, HGB, HCT, MCV, PLTNo results found for: NA, K, CL, CO2, BUN, CREATININE, GLUCOSE, CALCIUM, PROT, LABALBU, BILITOT, ALKPHOS, AST, ALT, LABGLOM, GFRAA, AGRATIO, GLOBNo results found for: INR, PROTIME    EXAMINATION:  MRI LUMBAR SPINE WO CONTRAST  12/2/2019 11:57 AM   HISTORY: Low back pain with right leg numbness. SI joint injection and   October 2019. COMPARISON: No comparison study. TECHNIQUE: Multiplanar imaging was performed. FINDINGS: Zoë Smith is a hemangioma in the T12 vertebral body versus   focal fatty infiltration along the superior endplate. No other   significant bone marrow signal abnormality is seen. The inferior tip   of the conus is at the superior endplate of L1. The visualized lower   thoracic spinal cord demonstrates no signal abnormality. At L5-S1, there is severe disc narrowing. There is a small central and   right paracentral disc osteophyte complex producing minimal dural sac   compression. There is mild facet arthropathy. There is no significant   central spinal canal stenosis. There is minimal inferior recess   foraminal narrowing due to endplate spurring. At L4-5, there is severe disc narrowing and disc degeneration. There   is disc bulging and endplate spurring. There is a central and left   paracentral disc osteophyte complex producing dural sac compression. There is moderate facet arthropathy with thickening of ligamentum   flavum. There is moderate to severe narrowing of the central spinal   canal due to the spurring. There is mild inferior recess foraminal   narrowing bilaterally left greater than right at this level. At L3-4, there is mild to moderate disc narrowing. There is moderate   disc bulging. There is moderate to severe facet arthropathy with   thickening of ligamentum flavum. These factors produce moderate to   severe central spinal canal narrowing. There is only mild foraminal   stenosis bilaterally. At L2-3, there is mild disc bulging. There is mild to moderate facet   arthropathy and thickening of ligamentum flavum. These factors produce   mild central spinal canal narrowing that does not appear to be   significant. There is minimal inferior recess foraminal narrowing   bilaterally due to disc bulging. At L1-2, the disc is fairly well maintained in height. There is no   disc herniation or spinal stenosis. There is mild facet arthropathy. At T11-12 and T12-L1, the disc spaces are fairly well maintained with   minimal disc bulging. There is no spinal or foraminal stenosis on the   sagittal images. There were no axial images obtained.       Impression   Degenerative changes are noted at several lumbar disc   levels. Please see the above report.    Signed by Dr Cris William on 12/2/2019 12:02 PM   I have personally reviewed these images and my interpretation is:  There is DDD L4-S1  L3-4 there is an anterolisthesis that measures 4 mm and results in mild to moderate canal stenosis  L4-5 There a disc herniation/osteophyte that results in severe left lateral recess stenosis that abuts the left L5 nerve root      EXAM: XR LUMBAR SPINE (MIN 4 VIEWS) -- 10/3/2019 12:56 PM   HISTORY: 53 years, Female, chronic bilateral low back pain without   sciatica   COMPARISON: None   TECHNIQUE:  3 images.  Lateral neutral, flexion, extension views   FINDINGS:     Limited evaluation due to technique. Assuming there are 5 lumbar type vertebral bodies, there is 6 mm   anterolisthesis L3 on L4, which increases to 8 mm anterolisthesis with   flexion. There is 6 mm anterolisthesis with extension. Disc height loss greatest at L4-5 with apparent vacuum disc   phenomenon. Facet hypertrophy. Surgical clips in the abdomen, most commonly due to cholecystectomy. Gastric lap band, incompletely assessed on this exam.       Impression   1. Limited evaluation due to technique. 2. There appears to be 6 mm anterolisthesis of L3 on L4 (assuming   conventional anatomy with 5 lumbar type vertebral bodies), which   increases to 8 mm anterolisthesis with flexion. 3. Disc height loss greatest at L4-5. Signed by Dr Piotr Hicks on 10/3/2019 3:18 PM   I have personally reviewed the images and my interpretation is: There a spondylolisthesis at L3-4 that measures 6mm on extension and 8 mm on flexion      ASSESSMENT:    April Sanders is a 62 y.o. female with a spondylolisthesis of L3-4 with some mild to moderate instability and some narrowing at L3-4 and L4-5. She complains of low back and RLE pain. ICD-10-CM    1. Spondylolisthesis at L3-L4 level  M43.16 APTT     CBC     Comprehensive Metabolic Panel     EKG 12 Lead     Protime-INR     Type and Screen     Urinalysis Reflex to Culture   2. Stenosis of lateral recess of lumbar spine  M48.061    3.  Chronic midline low back pain with right-sided

## 2020-07-21 ENCOUNTER — TELEPHONE (OUTPATIENT)
Dept: NEUROSURGERY | Age: 55
End: 2020-07-21

## 2020-07-21 NOTE — TELEPHONE ENCOUNTER
I have faxed clinicals and imaging to  [facility] for a surgery approval.  I am awaiting a response whether this has been approved or not. '

## 2020-07-22 ENCOUNTER — TELEPHONE (OUTPATIENT)
Dept: NEUROSURGERY | Age: 55
End: 2020-07-22

## 2020-07-22 NOTE — TELEPHONE ENCOUNTER
I called mikey agllardo @ [phone] and I was able to speak to [address] [place] [address]. I answered all her questions and after reviewing, she states patient upcoming surgery does not need a precertification.   Ref # is [id number]

## 2020-07-23 ENCOUNTER — HOSPITAL ENCOUNTER (OUTPATIENT)
Dept: PAIN MANAGEMENT | Age: 55
Discharge: HOME | End: 2020-07-23
Payer: COMMERCIAL

## 2020-07-23 VITALS
TEMPERATURE: 96.8 F | HEIGHT: 65 IN | OXYGEN SATURATION: 99 % | HEART RATE: 86 BPM | DIASTOLIC BLOOD PRESSURE: 77 MMHG | BODY MASS INDEX: 39.05 KG/M2 | WEIGHT: 234.38 LBS | SYSTOLIC BLOOD PRESSURE: 131 MMHG

## 2020-07-23 DIAGNOSIS — Z91.89 COMPLIANCE WITH MEDICATION REGIMEN: ICD-10-CM

## 2020-07-23 PROCEDURE — 99213 OFFICE O/P EST LOW 20 MIN: CPT

## 2020-07-23 PROCEDURE — 99214 OFFICE O/P EST MOD 30 MIN: CPT | Performed by: NURSE PRACTITIONER

## 2020-07-23 RX ORDER — PREGABALIN 50 MG/1
50 CAPSULE ORAL 2 TIMES DAILY
Qty: 60 CAPSULE | Refills: 1 | Status: SHIPPED | OUTPATIENT
Start: 2020-07-23 | End: 2020-08-20

## 2020-07-23 ASSESSMENT — PAIN SCALES - GENERAL: PAINLEVEL_OUTOF10: 6

## 2020-07-23 ASSESSMENT — PAIN DESCRIPTION - ORIENTATION: ORIENTATION: LOWER

## 2020-07-23 ASSESSMENT — PAIN DESCRIPTION - PAIN TYPE: TYPE: CHRONIC PAIN

## 2020-07-23 ASSESSMENT — PAIN DESCRIPTION - LOCATION: LOCATION: BACK

## 2020-07-23 NOTE — PROGRESS NOTES
Nursing Admission Record    Current Issues / Falls / ER Visits: Follow up procedure. Patient is scheduled for TLIF 3-4, 4-5 on 8/5/2020 with Dr. Hanane Watt    Opioids Prescribed: Norco 10mg Q8HRS PRN #90    Was Medication Brought to Appt: No     Hx of any Neck/Back Surgeries? None     Is Patient currently taking a blood thinner? Yes, Aspirin     MRI exams received in the past 2 years:  Yes MRI Lumbar Spine       When 12/2019                                              Where [facility]       Imaging on chart: Yes         Imaging records requested: No     CT exam received during the last 12 months: No       When na                                              [facility]       Imaging on chart: No         Imaging records requested: No     X-ray exam received during the last 12 months: Yes XR Lumbar Spine & XR Bilateral Hips/Pelvis       When 10/2019                                              Where [facility]       Imaging on chart: Yes         Imaging records requested: No     Nerve Conduction Study/EMG:  No       When na                                              [facility]       Imaging on chart: No         Imaging records requested:  No     Physical therapy: No       When: na                                               Where na     Behavior Health No       When: na                                               Where na     Labs  Yes       [facility] 0830                                             [facility]    PEG Score: 9    Last PEG Score: 9    Annual ORT Score: 8    Annual PHQ Score: 6    Last UDS Results: UDS Today    Education Provided:  [x] Review of Diony Lawler  [] Agreement Review  [x] PEG Score Calculated [] PHQ Score Calculated [] ORT Score Calculated    [] Compliance Issues Discussed [] Cognitive Behavior Needs [x] Exercise [] Review of Test [] Financial Issues  [x] Tobacco/Alcohol Use Reviewed [x] Teaching [] New Patient [] Picture Obtained    Physician Plan:  [] Outgoing Referral  [] Pharmacy Consult  [] Test Ordered [x] Prescription Ordered/Changed [] Blood Thinner Request Form  [] Obtained Test Results / Consult Notes  [] UDS due at next visit, verified per EPIC      [] Suspected Physical Abuse or Suicide Risk assessed - IF YES COMPLETE QUESTIONS BELOW    If any of the following questions are answered yes - contact attending physician for referral:    Has been considering harming self to escape stress, pain problems? [] YES  [] NO  Has a suicide plan? [] YES  [] NO  Has attempted suicide in the past?   [] YES  [] NO  Has a close friend or family member who committed suicide?   [] YES  [] NO    Assessment Completed by:  Electronically signed by Dorothy Crandall RN on 7/23/2020 at 1:39 PM

## 2020-07-23 NOTE — TELEPHONE ENCOUNTER
Refill at office. [address] refill not sent due to upcoming surgery.  Patient informed that it is okay to get pain medication from surgeon for post op pain

## 2020-07-28 LAB
ALPRAZOLAM, URINE CONFIRM: 596 NG/ML
ALPRAZOLAM, URINE: POSITIVE
AMPHETAMINES, URINE: NEGATIVE NG/ML
BARBITURATES, URINE: NEGATIVE NG/ML
BENZODIAZEPINES, URINE: ABNORMAL NG/ML
BENZODIAZEPINES, URINE: POSITIVE NG/ML
CANNABINOIDS, URINE: ABNORMAL NG/ML
CARBOXY THC GC/MS URINE: 41 NG/ML
CLONAZEPAM, URINE: NEGATIVE
COCAINE METABOLITE, URINE: NEGATIVE NG/ML
CODEINE, URINE: NEGATIVE
CREATININE, URINE: 45.5 MG/DL (ref 20–300)
FENTANYL URINE: NEGATIVE PG/ML
FLURAZEPAM, UR: NEGATIVE
HYDROCODONE, UR CONF: 1538 NG/ML
HYDROCODONE, URINE: POSITIVE
HYDROMORPHONE, UR CONF: 565 NG/ML
HYDROMORPHONE, URINE: POSITIVE
LORAZEPAM, URINE: NEGATIVE
MEPERIDINE, UR: NEGATIVE NG/ML
METHADONE SCREEN, URINE: NEGATIVE NG/ML
MIDAZOLAM, URINE: NEGATIVE
MORPHINE URINE: NEGATIVE
NORDIAZEPAM, URINE: NEGATIVE
OPIATES, URINE: ABNORMAL NG/ML
OPIATES, URINE: POSITIVE NG/ML
OXAZEPAM, URINE: NEGATIVE
OXYCODONE/OXYMORPHONE, UR: NEGATIVE NG/ML
PH, URINE: 5.3 (ref 4.5–8.9)
PHENCYCLIDINE, URINE: NEGATIVE NG/ML
PROPOXYPHENE, URINE: NEGATIVE NG/ML
TEMAZEPAM, URINE: NEGATIVE
TRIAZOLAM, URINE: NEGATIVE

## 2020-07-29 RX ORDER — METHOCARBAMOL 750 MG/1
750 TABLET, FILM COATED ORAL 4 TIMES DAILY PRN
Qty: 120 TABLET | Refills: 2 | Status: SHIPPED | OUTPATIENT
Start: 2020-07-29 | End: 2020-10-26 | Stop reason: SDUPTHER

## 2020-07-30 NOTE — TELEPHONE ENCOUNTER
Patient contacted NP regarding increasing myofacial pain.  She needs refill of medication and requested an extra dose for the times she is having worsening muscle cramps

## 2020-07-31 ENCOUNTER — HOSPITAL ENCOUNTER (OUTPATIENT)
Dept: GENERAL RADIOLOGY | Age: 55
Discharge: HOME | End: 2020-07-31
Payer: COMMERCIAL

## 2020-07-31 ENCOUNTER — HOSPITAL ENCOUNTER (OUTPATIENT)
Dept: PREADMISSION TESTING | Age: 55
Discharge: HOME | End: 2020-08-04
Payer: COMMERCIAL

## 2020-07-31 VITALS — WEIGHT: 232 LBS | HEIGHT: 65 IN | BODY MASS INDEX: 38.65 KG/M2

## 2020-07-31 LAB
ABO/RH: NORMAL
ALBUMIN SERPL-MCNC: 3.9 G/DL (ref 3.5–5.2)
ALP BLD-CCNC: 90 U/L (ref 35–104)
ALT SERPL-CCNC: 17 U/L (ref 5–33)
ANION GAP SERPL CALCULATED.3IONS-SCNC: 10 MMOL/L (ref 7–19)
ANTIBODY SCREEN: NORMAL
APTT: 22.9 SEC (ref 26–36.2)
AST SERPL-CCNC: 13 U/L (ref 5–32)
BILIRUB SERPL-MCNC: <0.2 MG/DL (ref 0.2–1.2)
BILIRUBIN URINE: NEGATIVE
BLOOD, URINE: NEGATIVE
BUN BLDV-MCNC: 30 MG/DL (ref 6–20)
CALCIUM SERPL-MCNC: 9 MG/DL (ref 8.6–10)
CHLORIDE BLD-SCNC: 101 MMOL/L (ref 98–111)
CLARITY: CLEAR
CO2: 27 MMOL/L (ref 22–29)
COLOR: YELLOW
CREAT SERPL-MCNC: 0.8 MG/DL (ref 0.5–0.9)
GFR AFRICAN AMERICAN: >59
GFR NON-AFRICAN AMERICAN: >60
GLUCOSE BLD-MCNC: 89 MG/DL (ref 74–109)
GLUCOSE URINE: NEGATIVE MG/DL
HCT VFR BLD CALC: 38.2 % (ref 37–47)
HEMOGLOBIN: 11.9 G/DL (ref 12–16)
INR BLD: 0.88 (ref 0.88–1.18)
KETONES, URINE: NEGATIVE MG/DL
LEUKOCYTE ESTERASE, URINE: NEGATIVE
MCH RBC QN AUTO: 31.6 PG (ref 27–31)
MCHC RBC AUTO-ENTMCNC: 31.2 G/DL (ref 33–37)
MCV RBC AUTO: 101.6 FL (ref 81–99)
NITRITE, URINE: NEGATIVE
PDW BLD-RTO: 13.4 % (ref 11.5–14.5)
PH UA: 5.5 (ref 5–8)
PLATELET # BLD: 419 K/UL (ref 130–400)
PMV BLD AUTO: 9.9 FL (ref 9.4–12.3)
POTASSIUM SERPL-SCNC: 5 MMOL/L (ref 3.5–5)
PROTEIN UA: NEGATIVE MG/DL
PROTHROMBIN TIME: 11.8 SEC (ref 12–14.6)
RBC # BLD: 3.76 M/UL (ref 4.2–5.4)
SODIUM BLD-SCNC: 138 MMOL/L (ref 136–145)
SPECIFIC GRAVITY UA: 1.02 (ref 1–1.03)
TOTAL PROTEIN: 6.4 G/DL (ref 6.6–8.7)
UROBILINOGEN, URINE: 0.2 E.U./DL
WBC # BLD: 9.7 K/UL (ref 4.8–10.8)

## 2020-07-31 PROCEDURE — 93005 ELECTROCARDIOGRAM TRACING: CPT

## 2020-07-31 PROCEDURE — 85027 COMPLETE CBC AUTOMATED: CPT

## 2020-07-31 PROCEDURE — 86900 BLOOD TYPING SEROLOGIC ABO: CPT

## 2020-07-31 PROCEDURE — 86850 RBC ANTIBODY SCREEN: CPT

## 2020-07-31 PROCEDURE — 80053 COMPREHEN METABOLIC PANEL: CPT

## 2020-07-31 PROCEDURE — 71046 X-RAY EXAM CHEST 2 VIEWS: CPT

## 2020-07-31 PROCEDURE — 81003 URINALYSIS AUTO W/O SCOPE: CPT

## 2020-07-31 PROCEDURE — 85610 PROTHROMBIN TIME: CPT

## 2020-07-31 PROCEDURE — 86901 BLOOD TYPING SEROLOGIC RH(D): CPT

## 2020-07-31 PROCEDURE — 85730 THROMBOPLASTIN TIME PARTIAL: CPT

## 2020-08-02 LAB
EKG P AXIS: 55 DEGREES
EKG P-R INTERVAL: 176 MS
EKG Q-T INTERVAL: 420 MS
EKG QRS DURATION: 82 MS
EKG QTC CALCULATION (BAZETT): 415 MS
EKG T AXIS: 25 DEGREES

## 2020-08-03 LAB — SARS-COV-2, NAA: NOT DETECTED

## 2020-08-04 ENCOUNTER — TELEPHONE (OUTPATIENT)
Dept: NEUROSURGERY | Age: 55
End: 2020-08-04

## 2020-08-05 ENCOUNTER — ANESTHESIA (OUTPATIENT)
Dept: OPERATING ROOM | Age: 55
DRG: 460 | End: 2020-08-05
Payer: COMMERCIAL

## 2020-08-05 ENCOUNTER — APPOINTMENT (OUTPATIENT)
Dept: GENERAL RADIOLOGY | Age: 55
DRG: 460 | End: 2020-08-05
Attending: NEUROLOGICAL SURGERY
Payer: COMMERCIAL

## 2020-08-05 ENCOUNTER — HOSPITAL ENCOUNTER (INPATIENT)
Age: 55
LOS: 2 days | Discharge: HOME | DRG: 460 | End: 2020-08-07
Attending: NEUROLOGICAL SURGERY | Admitting: NEUROLOGICAL SURGERY
Payer: COMMERCIAL

## 2020-08-05 ENCOUNTER — ANESTHESIA EVENT (OUTPATIENT)
Dept: OPERATING ROOM | Age: 55
DRG: 460 | End: 2020-08-05
Payer: COMMERCIAL

## 2020-08-05 VITALS
SYSTOLIC BLOOD PRESSURE: 118 MMHG | OXYGEN SATURATION: 96 % | TEMPERATURE: 98.8 F | DIASTOLIC BLOOD PRESSURE: 55 MMHG | RESPIRATION RATE: 17 BRPM

## 2020-08-05 PROBLEM — M43.16 SPONDYLOLISTHESIS AT L3-L4 LEVEL: Status: ACTIVE | Noted: 2020-08-05

## 2020-08-05 LAB
ABO/RH: NORMAL
ANTIBODY SCREEN: NORMAL

## 2020-08-05 PROCEDURE — 22842 INSERT SPINE FIXATION DEVICE: CPT | Performed by: NEUROLOGICAL SURGERY

## 2020-08-05 PROCEDURE — 01NB0ZZ RELEASE LUMBAR NERVE, OPEN APPROACH: ICD-10-PCS | Performed by: NEUROLOGICAL SURGERY

## 2020-08-05 PROCEDURE — C1762 CONN TISS, HUMAN(INC FASCIA): HCPCS | Performed by: NEUROLOGICAL SURGERY

## 2020-08-05 PROCEDURE — 36415 COLL VENOUS BLD VENIPUNCTURE: CPT

## 2020-08-05 PROCEDURE — 86850 RBC ANTIBODY SCREEN: CPT

## 2020-08-05 PROCEDURE — 6370000000 HC RX 637 (ALT 250 FOR IP): Performed by: NEUROLOGICAL SURGERY

## 2020-08-05 PROCEDURE — 6360000002 HC RX W HCPCS: Performed by: NEUROLOGICAL SURGERY

## 2020-08-05 PROCEDURE — 2580000003 HC RX 258: Performed by: NURSE ANESTHETIST, CERTIFIED REGISTERED

## 2020-08-05 PROCEDURE — 86901 BLOOD TYPING SEROLOGIC RH(D): CPT

## 2020-08-05 PROCEDURE — 2580000003 HC RX 258: Performed by: ANESTHESIOLOGY

## 2020-08-05 PROCEDURE — C1776 JOINT DEVICE (IMPLANTABLE): HCPCS | Performed by: NEUROLOGICAL SURGERY

## 2020-08-05 PROCEDURE — 22632 ARTHRD PST TQ 1NTRSPC LM EA: CPT | Performed by: NEUROLOGICAL SURGERY

## 2020-08-05 PROCEDURE — C1769 GUIDE WIRE: HCPCS | Performed by: NEUROLOGICAL SURGERY

## 2020-08-05 PROCEDURE — 22853 INSJ BIOMECHANICAL DEVICE: CPT | Performed by: NEUROLOGICAL SURGERY

## 2020-08-05 PROCEDURE — 0SB20ZZ EXCISION OF LUMBAR VERTEBRAL DISC, OPEN APPROACH: ICD-10-PCS | Performed by: NEUROLOGICAL SURGERY

## 2020-08-05 PROCEDURE — C1713 ANCHOR/SCREW BN/BN,TIS/BN: HCPCS | Performed by: NEUROLOGICAL SURGERY

## 2020-08-05 PROCEDURE — 72100 X-RAY EXAM L-S SPINE 2/3 VWS: CPT

## 2020-08-05 PROCEDURE — 2580000003 HC RX 258: Performed by: NEUROLOGICAL SURGERY

## 2020-08-05 PROCEDURE — 2780000010 HC IMPLANT OTHER: Performed by: NEUROLOGICAL SURGERY

## 2020-08-05 PROCEDURE — 6360000002 HC RX W HCPCS: Performed by: NURSE ANESTHETIST, CERTIFIED REGISTERED

## 2020-08-05 PROCEDURE — 3700000000 HC ANESTHESIA ATTENDED CARE: Performed by: NEUROLOGICAL SURGERY

## 2020-08-05 PROCEDURE — 3700000001 HC ADD 15 MINUTES (ANESTHESIA): Performed by: NEUROLOGICAL SURGERY

## 2020-08-05 PROCEDURE — 0SG10AJ FUSION OF 2 OR MORE LUMBAR VERTEBRAL JOINTS WITH INTERBODY FUSION DEVICE, POSTERIOR APPROACH, ANTERIOR COLUMN, OPEN APPROACH: ICD-10-PCS | Performed by: NEUROLOGICAL SURGERY

## 2020-08-05 PROCEDURE — 7100000001 HC PACU RECOVERY - ADDTL 15 MIN: Performed by: NEUROLOGICAL SURGERY

## 2020-08-05 PROCEDURE — 3600000015 HC SURGERY LEVEL 5 ADDTL 15MIN: Performed by: NEUROLOGICAL SURGERY

## 2020-08-05 PROCEDURE — 3600000005 HC SURGERY LEVEL 5 BASE: Performed by: NEUROLOGICAL SURGERY

## 2020-08-05 PROCEDURE — 2720000010 HC SURG SUPPLY STERILE: Performed by: NEUROLOGICAL SURGERY

## 2020-08-05 PROCEDURE — 6360000002 HC RX W HCPCS: Performed by: ANESTHESIOLOGY

## 2020-08-05 PROCEDURE — 22630 ARTHRD PST TQ 1NTRSPC LUM: CPT | Performed by: NEUROLOGICAL SURGERY

## 2020-08-05 PROCEDURE — 07DS0ZZ EXTRACTION OF VERTEBRAL BONE MARROW, OPEN APPROACH: ICD-10-PCS | Performed by: NEUROLOGICAL SURGERY

## 2020-08-05 PROCEDURE — 7100000000 HC PACU RECOVERY - FIRST 15 MIN: Performed by: NEUROLOGICAL SURGERY

## 2020-08-05 PROCEDURE — 2709999900 HC NON-CHARGEABLE SUPPLY: Performed by: NEUROLOGICAL SURGERY

## 2020-08-05 PROCEDURE — 86900 BLOOD TYPING SEROLOGIC ABO: CPT

## 2020-08-05 PROCEDURE — 1210000000 HC MED SURG R&B

## 2020-08-05 PROCEDURE — 2500000003 HC RX 250 WO HCPCS: Performed by: NURSE ANESTHETIST, CERTIFIED REGISTERED

## 2020-08-05 PROCEDURE — 3209999900 FLUORO FOR SURGICAL PROCEDURES

## 2020-08-05 DEVICE — SPACER 7770723 ELEVATE X-LOR 23X7MM
Type: IMPLANTABLE DEVICE | Site: SPINE LUMBAR | Status: FUNCTIONAL
Brand: ELEVATE™ SPINAL SYSTEM

## 2020-08-05 DEVICE — SPACER 7770823 ELEVATE STD 23X8MM
Type: IMPLANTABLE DEVICE | Site: SPINE LUMBAR | Status: FUNCTIONAL
Brand: ELEVATE™ SPINAL SYSTEM

## 2020-08-05 DEVICE — ROD 641000075 75MM PERC ROD 4.75MM CCM
Type: IMPLANTABLE DEVICE | Site: SPINE LUMBAR | Status: FUNCTIONAL
Brand: CD HORIZON® SOLERA® SPINAL SYSTEM

## 2020-08-05 DEVICE — GRAFT BNE CRUSH 30 CC OSCF200330] ZIMMER BIOMET INC]: Type: IMPLANTABLE DEVICE | Site: SPINE LUMBAR | Status: FUNCTIONAL

## 2020-08-05 DEVICE — BONE GRAFT KIT 7510050 INFUSE XX SMALL
Type: IMPLANTABLE DEVICE | Site: SPINE LUMBAR | Status: FUNCTIONAL
Brand: INFUSE® BONE GRAFT

## 2020-08-05 DEVICE — SET SCR SPNL DIA4.75MM POST THORLUM SACR TI PERC APPRCH CDH: Type: IMPLANTABLE DEVICE | Site: SPINE LUMBAR | Status: FUNCTIONAL

## 2020-08-05 RX ORDER — EPHEDRINE SULFATE 50 MG/ML
INJECTION, SOLUTION INTRAVENOUS PRN
Status: DISCONTINUED | OUTPATIENT
Start: 2020-08-05 | End: 2020-08-05 | Stop reason: SDUPTHER

## 2020-08-05 RX ORDER — SODIUM CHLORIDE 0.9 % (FLUSH) 0.9 %
10 SYRINGE (ML) INJECTION PRN
Status: DISCONTINUED | OUTPATIENT
Start: 2020-08-05 | End: 2020-08-05 | Stop reason: HOSPADM

## 2020-08-05 RX ORDER — ROCURONIUM BROMIDE 10 MG/ML
INJECTION, SOLUTION INTRAVENOUS PRN
Status: DISCONTINUED | OUTPATIENT
Start: 2020-08-05 | End: 2020-08-05 | Stop reason: SDUPTHER

## 2020-08-05 RX ORDER — FENTANYL CITRATE 50 UG/ML
50 INJECTION, SOLUTION INTRAMUSCULAR; INTRAVENOUS
Status: DISCONTINUED | OUTPATIENT
Start: 2020-08-05 | End: 2020-08-05 | Stop reason: HOSPADM

## 2020-08-05 RX ORDER — SODIUM CHLORIDE 9 MG/ML
INJECTION, SOLUTION INTRAVENOUS CONTINUOUS
Status: DISCONTINUED | OUTPATIENT
Start: 2020-08-05 | End: 2020-08-06

## 2020-08-05 RX ORDER — PREGABALIN 50 MG/1
50 CAPSULE ORAL 2 TIMES DAILY
Status: DISCONTINUED | OUTPATIENT
Start: 2020-08-05 | End: 2020-08-07 | Stop reason: HOSPADM

## 2020-08-05 RX ORDER — FENTANYL CITRATE 50 UG/ML
50 INJECTION, SOLUTION INTRAMUSCULAR; INTRAVENOUS ONCE
Status: COMPLETED | OUTPATIENT
Start: 2020-08-05 | End: 2020-08-05

## 2020-08-05 RX ORDER — BUSPIRONE HYDROCHLORIDE 5 MG/1
5 TABLET ORAL 2 TIMES DAILY
Status: DISCONTINUED | OUTPATIENT
Start: 2020-08-05 | End: 2020-08-07 | Stop reason: HOSPADM

## 2020-08-05 RX ORDER — PROPOFOL 10 MG/ML
INJECTION, EMULSION INTRAVENOUS PRN
Status: DISCONTINUED | OUTPATIENT
Start: 2020-08-05 | End: 2020-08-05 | Stop reason: SDUPTHER

## 2020-08-05 RX ORDER — PROMETHAZINE HYDROCHLORIDE 25 MG/ML
6.25 INJECTION, SOLUTION INTRAMUSCULAR; INTRAVENOUS
Status: DISCONTINUED | OUTPATIENT
Start: 2020-08-05 | End: 2020-08-05 | Stop reason: HOSPADM

## 2020-08-05 RX ORDER — SODIUM CHLORIDE, SODIUM LACTATE, POTASSIUM CHLORIDE, CALCIUM CHLORIDE 600; 310; 30; 20 MG/100ML; MG/100ML; MG/100ML; MG/100ML
INJECTION, SOLUTION INTRAVENOUS CONTINUOUS
Status: DISCONTINUED | OUTPATIENT
Start: 2020-08-05 | End: 2020-08-05

## 2020-08-05 RX ORDER — ALPRAZOLAM 1 MG/1
1 TABLET ORAL NIGHTLY PRN
Status: DISCONTINUED | OUTPATIENT
Start: 2020-08-05 | End: 2020-08-07 | Stop reason: HOSPADM

## 2020-08-05 RX ORDER — METHOCARBAMOL 750 MG/1
1500 TABLET, FILM COATED ORAL EVERY 6 HOURS
Status: DISCONTINUED | OUTPATIENT
Start: 2020-08-05 | End: 2020-08-07 | Stop reason: HOSPADM

## 2020-08-05 RX ORDER — HYDROMORPHONE HYDROCHLORIDE 1 MG/ML
1 INJECTION, SOLUTION INTRAMUSCULAR; INTRAVENOUS; SUBCUTANEOUS
Status: DISCONTINUED | OUTPATIENT
Start: 2020-08-05 | End: 2020-08-07 | Stop reason: HOSPADM

## 2020-08-05 RX ORDER — HYDROCODONE BITARTRATE AND ACETAMINOPHEN 10; 325 MG/1; MG/1
1 TABLET ORAL EVERY 4 HOURS PRN
Status: DISCONTINUED | OUTPATIENT
Start: 2020-08-05 | End: 2020-08-07 | Stop reason: HOSPADM

## 2020-08-05 RX ORDER — DEXAMETHASONE SODIUM PHOSPHATE 10 MG/ML
INJECTION, SOLUTION INTRAMUSCULAR; INTRAVENOUS PRN
Status: DISCONTINUED | OUTPATIENT
Start: 2020-08-05 | End: 2020-08-05 | Stop reason: SDUPTHER

## 2020-08-05 RX ORDER — SODIUM CHLORIDE 0.9 % (FLUSH) 0.9 %
10 SYRINGE (ML) INJECTION EVERY 12 HOURS SCHEDULED
Status: DISCONTINUED | OUTPATIENT
Start: 2020-08-05 | End: 2020-08-05 | Stop reason: HOSPADM

## 2020-08-05 RX ORDER — SODIUM CHLORIDE, SODIUM LACTATE, POTASSIUM CHLORIDE, CALCIUM CHLORIDE 600; 310; 30; 20 MG/100ML; MG/100ML; MG/100ML; MG/100ML
INJECTION, SOLUTION INTRAVENOUS CONTINUOUS PRN
Status: DISCONTINUED | OUTPATIENT
Start: 2020-08-05 | End: 2020-08-05 | Stop reason: SDUPTHER

## 2020-08-05 RX ORDER — METHOCARBAMOL 750 MG/1
750 TABLET, FILM COATED ORAL 4 TIMES DAILY PRN
Status: DISCONTINUED | OUTPATIENT
Start: 2020-08-05 | End: 2020-08-07 | Stop reason: HOSPADM

## 2020-08-05 RX ORDER — SODIUM CHLORIDE 0.9 % (FLUSH) 0.9 %
10 SYRINGE (ML) INJECTION PRN
Status: DISCONTINUED | OUTPATIENT
Start: 2020-08-05 | End: 2020-08-07 | Stop reason: HOSPADM

## 2020-08-05 RX ORDER — LIDOCAINE HYDROCHLORIDE 10 MG/ML
1 INJECTION, SOLUTION EPIDURAL; INFILTRATION; INTRACAUDAL; PERINEURAL
Status: DISCONTINUED | OUTPATIENT
Start: 2020-08-05 | End: 2020-08-05 | Stop reason: HOSPADM

## 2020-08-05 RX ORDER — HYDROMORPHONE HYDROCHLORIDE 1 MG/ML
0.25 INJECTION, SOLUTION INTRAMUSCULAR; INTRAVENOUS; SUBCUTANEOUS EVERY 5 MIN PRN
Status: DISCONTINUED | OUTPATIENT
Start: 2020-08-05 | End: 2020-08-05 | Stop reason: HOSPADM

## 2020-08-05 RX ORDER — FAMOTIDINE 20 MG/1
20 TABLET, FILM COATED ORAL 2 TIMES DAILY
Status: DISCONTINUED | OUTPATIENT
Start: 2020-08-05 | End: 2020-08-07 | Stop reason: HOSPADM

## 2020-08-05 RX ORDER — HYDROMORPHONE HCL IN WATER/PF 6 MG/30 ML
PATIENT CONTROLLED ANALGESIA SYRINGE INTRAVENOUS CONTINUOUS
Status: DISCONTINUED | OUTPATIENT
Start: 2020-08-05 | End: 2020-08-06

## 2020-08-05 RX ORDER — MEPERIDINE HYDROCHLORIDE 50 MG/ML
12.5 INJECTION INTRAMUSCULAR; INTRAVENOUS; SUBCUTANEOUS EVERY 5 MIN PRN
Status: DISCONTINUED | OUTPATIENT
Start: 2020-08-05 | End: 2020-08-05 | Stop reason: HOSPADM

## 2020-08-05 RX ORDER — HYDROCHLOROTHIAZIDE 12.5 MG/1
25 CAPSULE, GELATIN COATED ORAL DAILY
Status: DISCONTINUED | OUTPATIENT
Start: 2020-08-05 | End: 2020-08-07 | Stop reason: HOSPADM

## 2020-08-05 RX ORDER — MIDAZOLAM HYDROCHLORIDE 1 MG/ML
2 INJECTION INTRAMUSCULAR; INTRAVENOUS
Status: DISCONTINUED | OUTPATIENT
Start: 2020-08-05 | End: 2020-08-05 | Stop reason: HOSPADM

## 2020-08-05 RX ORDER — DIPHENHYDRAMINE HYDROCHLORIDE 50 MG/ML
12.5 INJECTION INTRAMUSCULAR; INTRAVENOUS
Status: DISCONTINUED | OUTPATIENT
Start: 2020-08-05 | End: 2020-08-05 | Stop reason: HOSPADM

## 2020-08-05 RX ORDER — MORPHINE SULFATE 4 MG/ML
2 INJECTION, SOLUTION INTRAMUSCULAR; INTRAVENOUS EVERY 5 MIN PRN
Status: DISCONTINUED | OUTPATIENT
Start: 2020-08-05 | End: 2020-08-05 | Stop reason: HOSPADM

## 2020-08-05 RX ORDER — SUFENTANIL CITRATE 50 UG/ML
INJECTION EPIDURAL; INTRAVENOUS PRN
Status: DISCONTINUED | OUTPATIENT
Start: 2020-08-05 | End: 2020-08-05 | Stop reason: SDUPTHER

## 2020-08-05 RX ORDER — NALOXONE HYDROCHLORIDE 0.4 MG/ML
0.4 INJECTION, SOLUTION INTRAMUSCULAR; INTRAVENOUS; SUBCUTANEOUS PRN
Status: DISCONTINUED | OUTPATIENT
Start: 2020-08-05 | End: 2020-08-07 | Stop reason: HOSPADM

## 2020-08-05 RX ORDER — LIDOCAINE HYDROCHLORIDE 10 MG/ML
INJECTION, SOLUTION EPIDURAL; INFILTRATION; INTRACAUDAL; PERINEURAL PRN
Status: DISCONTINUED | OUTPATIENT
Start: 2020-08-05 | End: 2020-08-05 | Stop reason: SDUPTHER

## 2020-08-05 RX ORDER — SODIUM CHLORIDE 0.9 % (FLUSH) 0.9 %
10 SYRINGE (ML) INJECTION EVERY 12 HOURS SCHEDULED
Status: DISCONTINUED | OUTPATIENT
Start: 2020-08-05 | End: 2020-08-07 | Stop reason: HOSPADM

## 2020-08-05 RX ORDER — LABETALOL HYDROCHLORIDE 5 MG/ML
5 INJECTION, SOLUTION INTRAVENOUS EVERY 10 MIN PRN
Status: DISCONTINUED | OUTPATIENT
Start: 2020-08-05 | End: 2020-08-05 | Stop reason: HOSPADM

## 2020-08-05 RX ORDER — HYDROMORPHONE HYDROCHLORIDE 1 MG/ML
0.5 INJECTION, SOLUTION INTRAMUSCULAR; INTRAVENOUS; SUBCUTANEOUS EVERY 5 MIN PRN
Status: COMPLETED | OUTPATIENT
Start: 2020-08-05 | End: 2020-08-05

## 2020-08-05 RX ORDER — VANCOMYCIN HYDROCHLORIDE 1 G/20ML
INJECTION, POWDER, LYOPHILIZED, FOR SOLUTION INTRAVENOUS PRN
Status: DISCONTINUED | OUTPATIENT
Start: 2020-08-05 | End: 2020-08-05 | Stop reason: SDUPTHER

## 2020-08-05 RX ORDER — METOCLOPRAMIDE HYDROCHLORIDE 5 MG/ML
10 INJECTION INTRAMUSCULAR; INTRAVENOUS
Status: DISCONTINUED | OUTPATIENT
Start: 2020-08-05 | End: 2020-08-05 | Stop reason: HOSPADM

## 2020-08-05 RX ORDER — MORPHINE SULFATE 4 MG/ML
4 INJECTION, SOLUTION INTRAMUSCULAR; INTRAVENOUS
Status: DISCONTINUED | OUTPATIENT
Start: 2020-08-05 | End: 2020-08-05 | Stop reason: HOSPADM

## 2020-08-05 RX ORDER — DIPHENHYDRAMINE HYDROCHLORIDE 50 MG/ML
25 INJECTION INTRAMUSCULAR; INTRAVENOUS EVERY 6 HOURS PRN
Status: DISCONTINUED | OUTPATIENT
Start: 2020-08-05 | End: 2020-08-07 | Stop reason: HOSPADM

## 2020-08-05 RX ORDER — SCOLOPAMINE TRANSDERMAL SYSTEM 1 MG/1
1 PATCH, EXTENDED RELEASE TRANSDERMAL ONCE
Status: DISCONTINUED | OUTPATIENT
Start: 2020-08-05 | End: 2020-08-05 | Stop reason: HOSPADM

## 2020-08-05 RX ORDER — LISINOPRIL 10 MG/1
10 TABLET ORAL DAILY
Status: DISCONTINUED | OUTPATIENT
Start: 2020-08-05 | End: 2020-08-07 | Stop reason: HOSPADM

## 2020-08-05 RX ORDER — MORPHINE SULFATE 4 MG/ML
4 INJECTION, SOLUTION INTRAMUSCULAR; INTRAVENOUS EVERY 5 MIN PRN
Status: DISCONTINUED | OUTPATIENT
Start: 2020-08-05 | End: 2020-08-05 | Stop reason: HOSPADM

## 2020-08-05 RX ORDER — ONDANSETRON 2 MG/ML
INJECTION INTRAMUSCULAR; INTRAVENOUS PRN
Status: DISCONTINUED | OUTPATIENT
Start: 2020-08-05 | End: 2020-08-05 | Stop reason: SDUPTHER

## 2020-08-05 RX ORDER — HYDRALAZINE HYDROCHLORIDE 20 MG/ML
5 INJECTION INTRAMUSCULAR; INTRAVENOUS EVERY 10 MIN PRN
Status: DISCONTINUED | OUTPATIENT
Start: 2020-08-05 | End: 2020-08-05 | Stop reason: HOSPADM

## 2020-08-05 RX ADMIN — METHOCARBAMOL TABLETS 1500 MG: 750 TABLET, COATED ORAL at 22:09

## 2020-08-05 RX ADMIN — SODIUM CHLORIDE, POTASSIUM CHLORIDE, SODIUM LACTATE AND CALCIUM CHLORIDE: 600; 310; 30; 20 INJECTION, SOLUTION INTRAVENOUS at 06:11

## 2020-08-05 RX ADMIN — HYDROMORPHONE HYDROCHLORIDE 0.5 MG: 1 INJECTION, SOLUTION INTRAMUSCULAR; INTRAVENOUS; SUBCUTANEOUS at 11:44

## 2020-08-05 RX ADMIN — ONDANSETRON HYDROCHLORIDE 4 MG: 2 INJECTION, SOLUTION INTRAMUSCULAR; INTRAVENOUS at 11:42

## 2020-08-05 RX ADMIN — SODIUM CHLORIDE, SODIUM LACTATE, POTASSIUM CHLORIDE, AND CALCIUM CHLORIDE: 600; 310; 30; 20 INJECTION, SOLUTION INTRAVENOUS at 12:07

## 2020-08-05 RX ADMIN — FENTANYL CITRATE 50 MCG: 50 INJECTION INTRAMUSCULAR; INTRAVENOUS at 13:29

## 2020-08-05 RX ADMIN — SODIUM CHLORIDE: 9 INJECTION, SOLUTION INTRAVENOUS at 15:52

## 2020-08-05 RX ADMIN — SUFENTANIL CITRATE 50 MCG: 50 INJECTION EPIDURAL; INTRAVENOUS at 07:36

## 2020-08-05 RX ADMIN — SUFENTANIL CITRATE 5 MCG: 50 INJECTION EPIDURAL; INTRAVENOUS at 11:14

## 2020-08-05 RX ADMIN — SUFENTANIL CITRATE 15 MCG: 50 INJECTION EPIDURAL; INTRAVENOUS at 10:00

## 2020-08-05 RX ADMIN — ROCURONIUM BROMIDE 10 MG: 10 INJECTION, SOLUTION INTRAVENOUS at 10:51

## 2020-08-05 RX ADMIN — VANCOMYCIN HYDROCHLORIDE 1500 MG: 10 INJECTION, POWDER, LYOPHILIZED, FOR SOLUTION INTRAVENOUS at 06:19

## 2020-08-05 RX ADMIN — SUGAMMADEX 220 MG: 100 INJECTION, SOLUTION INTRAVENOUS at 11:49

## 2020-08-05 RX ADMIN — DEXAMETHASONE SODIUM PHOSPHATE 10 MG: 10 INJECTION, SOLUTION INTRAMUSCULAR; INTRAVENOUS at 07:46

## 2020-08-05 RX ADMIN — ROCURONIUM BROMIDE 25 MG: 10 INJECTION, SOLUTION INTRAVENOUS at 08:23

## 2020-08-05 RX ADMIN — LIDOCAINE HYDROCHLORIDE 50 MG: 10 INJECTION, SOLUTION EPIDURAL; INFILTRATION; INTRACAUDAL; PERINEURAL at 07:36

## 2020-08-05 RX ADMIN — HYDROMORPHONE HYDROCHLORIDE 0.5 MG: 1 INJECTION, SOLUTION INTRAMUSCULAR; INTRAVENOUS; SUBCUTANEOUS at 12:41

## 2020-08-05 RX ADMIN — EPHEDRINE SULFATE 10 MG: 50 INJECTION INTRAMUSCULAR; INTRAVENOUS; SUBCUTANEOUS at 07:57

## 2020-08-05 RX ADMIN — ROCURONIUM BROMIDE 15 MG: 10 INJECTION, SOLUTION INTRAVENOUS at 09:11

## 2020-08-05 RX ADMIN — PROPOFOL 150 MG: 10 INJECTION, EMULSION INTRAVENOUS at 07:36

## 2020-08-05 RX ADMIN — EPHEDRINE SULFATE 10 MG: 50 INJECTION INTRAMUSCULAR; INTRAVENOUS; SUBCUTANEOUS at 08:51

## 2020-08-05 RX ADMIN — HYDROMORPHONE HYDROCHLORIDE 0.5 MG: 1 INJECTION, SOLUTION INTRAMUSCULAR; INTRAVENOUS; SUBCUTANEOUS at 13:09

## 2020-08-05 RX ADMIN — METHOCARBAMOL TABLETS 750 MG: 750 TABLET, COATED ORAL at 13:07

## 2020-08-05 RX ADMIN — SUFENTANIL CITRATE 5 MCG: 50 INJECTION EPIDURAL; INTRAVENOUS at 11:06

## 2020-08-05 RX ADMIN — PREGABALIN 50 MG: 50 CAPSULE ORAL at 22:09

## 2020-08-05 RX ADMIN — EPHEDRINE SULFATE 10 MG: 50 INJECTION INTRAMUSCULAR; INTRAVENOUS; SUBCUTANEOUS at 07:45

## 2020-08-05 RX ADMIN — BUSPIRONE HYDROCHLORIDE 5 MG: 5 TABLET ORAL at 22:07

## 2020-08-05 RX ADMIN — FENTANYL CITRATE 50 MCG: 50 INJECTION INTRAMUSCULAR; INTRAVENOUS at 13:59

## 2020-08-05 RX ADMIN — ROCURONIUM BROMIDE 50 MG: 10 INJECTION, SOLUTION INTRAVENOUS at 07:36

## 2020-08-05 RX ADMIN — Medication: at 15:41

## 2020-08-05 RX ADMIN — SUFENTANIL CITRATE 5 MCG: 50 INJECTION EPIDURAL; INTRAVENOUS at 11:35

## 2020-08-05 RX ADMIN — HYDROMORPHONE HYDROCHLORIDE 0.5 MG: 1 INJECTION, SOLUTION INTRAMUSCULAR; INTRAVENOUS; SUBCUTANEOUS at 12:06

## 2020-08-05 RX ADMIN — HYDROMORPHONE HYDROCHLORIDE 0.5 MG: 1 INJECTION, SOLUTION INTRAMUSCULAR; INTRAVENOUS; SUBCUTANEOUS at 12:56

## 2020-08-05 RX ADMIN — VANCOMYCIN HYDROCHLORIDE 1500 MG: 1 INJECTION, POWDER, LYOPHILIZED, FOR SOLUTION INTRAVENOUS at 07:40

## 2020-08-05 RX ADMIN — FAMOTIDINE 20 MG: 20 TABLET ORAL at 22:06

## 2020-08-05 RX ADMIN — HYDROMORPHONE HYDROCHLORIDE 0.5 MG: 1 INJECTION, SOLUTION INTRAMUSCULAR; INTRAVENOUS; SUBCUTANEOUS at 12:29

## 2020-08-05 RX ADMIN — SODIUM CHLORIDE, SODIUM LACTATE, POTASSIUM CHLORIDE, AND CALCIUM CHLORIDE: 600; 310; 30; 20 INJECTION, SOLUTION INTRAVENOUS at 07:32

## 2020-08-05 ASSESSMENT — PAIN SCALES - GENERAL
PAINLEVEL_OUTOF10: 10
PAINLEVEL_OUTOF10: 6
PAINLEVEL_OUTOF10: 10
PAINLEVEL_OUTOF10: 8
PAINLEVEL_OUTOF10: 10
PAINLEVEL_OUTOF10: 10

## 2020-08-05 ASSESSMENT — LIFESTYLE VARIABLES: SMOKING_STATUS: 1

## 2020-08-05 ASSESSMENT — ENCOUNTER SYMPTOMS: SHORTNESS OF BREATH: 1

## 2020-08-05 NOTE — PROGRESS NOTES
Called Dr. Savage Phipps back, patient states pain is still \"10\" on 0-10 scale, after receiving dose of Fentanyl. Received order to give additional dose of fentanyl 50 mcg x 1, and monitor for 20 minutes prior to sending up to room. And may send to floor if vital signs are stable.

## 2020-08-05 NOTE — ANESTHESIA POSTPROCEDURE EVALUATION
Department of Anesthesiology  Postprocedure Note    Patient: Paulette Foster  MRN: [id number]  YOB: 1965  Date of evaluation: 8/5/2020  Time:  12:15 PM     Procedure Summary     Date:  08/05/20 Room / Location:  [address]    Anesthesia Start:  0732 Anesthesia Stop:  0176    Procedure:  TLIF L3-4, L4-5 (N/A ) Diagnosis:  (Spondylolisthesis L3-4)    Surgeon:  Jared Horta DO Responsible Provider:  DAYSI Wells CRNA    Anesthesia Type:  general ASA Status:  3          Anesthesia Type: general    Rossy Phase I:      Rossy Phase II:      Last vitals: Reviewed and per EMR flowsheets.        Anesthesia Post Evaluation    Patient location during evaluation: PACU  Patient participation: complete - patient participated  Level of consciousness: awake and alert  Pain score: 0  Airway patency: patent  Nausea & Vomiting: no nausea and no vomiting  Complications: no  Cardiovascular status: hemodynamically stable  Respiratory status: acceptable  Hydration status: euvolemic

## 2020-08-05 NOTE — PROGRESS NOTES
Patient has received dilaudid 2 mg and robaxin 750 mg since arrival in PACU. Called and spoke with Dr. Mike Pappas to see if other pain control can be given. Received order for Fentanyl 50 mcg x 1.

## 2020-08-05 NOTE — ANESTHESIA PRE PROCEDURE
Department of Anesthesiology  Preprocedure Note       Name:  Brian Murphy   Age:  62 y.o.  :  1965                                          MRN:  [id number]         Date:  2020      Surgeon: Walter Schafer):  Adam Vora DO    Procedure: Procedure(s):  TLIF L3-4, L4-5    Medications prior to admission:   Prior to Admission medications    Medication Sig Start Date End Date Taking? Authorizing Provider   methocarbamol (ROBAXIN-750) 750 MG tablet Take 1 tablet by mouth 4 times daily as needed (muscle spasm) 7/29/20 10/27/20 Yes DAYSI Ortiz CNP   pregabalin (LYRICA) 50 MG capsule Take 1 capsule by mouth 2 times daily for 60 days. 20 Yes DAYSI Ortiz CNP   acetaminophen (TYLENOL) 325 MG tablet Take 325 mg by mouth every 6 hours as needed for Pain    Yes Historical Provider, MD   HYDROcodone-acetaminophen (NORCO)  MG per tablet Take 1 tablet by mouth every 6 hours as needed for Pain for up to 30 days. May fill 20  Patient taking differently: Take 1 tablet by mouth every 6 hours as needed for Pain ([facility] pain [facility]mt[facility] ).  May fill 20 Yes DAYSI Ortiz CNP   busPIRone (BUSPAR) 5 MG tablet Take 5 mg by mouth 2 times daily as needed   Yes Historical Provider, MD   DICLOFENAC PO Take 75 mg by mouth 2 times daily   Yes Historical Provider, MD   famotidine (PEPCID) 20 MG tablet Take 20 mg by mouth 2 times daily   Yes Historical Provider, MD   hydrochlorothiazide (MICROZIDE) 12.5 MG capsule Take 25 mg by mouth daily    Yes Historical Provider, MD   lisinopril (PRINIVIL;ZESTRIL) 20 MG tablet Take 10 mg by mouth daily    Yes Historical Provider, MD   ALPRAZolam (XANAX) 1 MG tablet Take 1 mg by mouth nightly as needed for Sleep   Yes Historical Provider, MD   aspirin 325 MG tablet Take 325 mg by mouth daily    Historical Provider, MD   diphenhydrAMINE HCl (BENADRYL PO) Take 25 mg by mouth daily as needed     Historical Provider, MD   clotrimazole Shortness of breath R06.02    Near syncope R55    Essential hypertension I10    Tobacco abuse Z72.0    Chronic bilateral low back pain with bilateral sciatica M54.42, M54.41, G89.29    SI (sacroiliac) joint dysfunction M53.3    Piriformis syndrome of right side G57.01    Chronic pain of both shoulders M25.511, G89.29, M25.512    Trochanteric bursitis of right hip M70.61    Encounter for smoking cessation counseling Z71.6    Anterolisthesis M43.10       Past Medical History:        Diagnosis Date    Anxiety     Asthma     Back pain     Colon polyps     DDD (degenerative disc disease), cervical     Depression     Essential hypertension 10/19/2016    GERD (gastroesophageal reflux disease)     Hiatal hernia     HTN (hypertension)     Post-menopausal     Shoulder pain     Tobacco abuse 10/19/2016       Past Surgical History:        Procedure Laterality Date    GALLBLADDER SURGERY      HERNIA REPAIR      LAP BAND  08/28/2009       Social History:    Social History     Tobacco Use    Smoking status: Current Every Day Smoker     Packs/day: 1.00     Types: Cigarettes     Start date: 10/19/1988    Smokeless tobacco: Never Used   Substance Use Topics    Alcohol use: Not Currently     Comment: rare                                Ready to quit: Not Answered  Counseling given: Not Answered      Vital Signs (Current):   Vitals:    08/05/20 0553   BP: 126/64   Pulse: 70   Resp: 18   Temp: 98 °F (36.7 °C)   TempSrc: Temporal   SpO2: 97%   Weight: 232 lb (105.2 kg)   Height: 5' 5\" (1.651 m)                                              BP Readings from Last 3 Encounters:   08/05/20 126/64   07/23/20 131/77   07/16/20 112/70       NPO Status: Time of last liquid consumption: 0000                        Time of last solid consumption: 0000                        Date of last liquid consumption: 08/04/20                        Date of last solid food consumption: 08/04/20    BMI:   Wt Readings from Last 3 reviewed  Rhythm: regular  Rate: normal    Stress test reviewed       Beta Blocker:  Not on Beta Blocker         Neuro/Psych:   Negative Neuro/Psych ROS  (+) neuromuscular disease:, psychiatric history:   (-) seizures, CVA and depression/anxiety            GI/Hepatic/Renal: Neg GI/Hepatic/Renal ROS  (+) hiatal hernia, GERD:, morbid obesity          Endo/Other: Negative Endo/Other ROS             Pt had PAT visit. Abdominal:   (+) obese,     Abdomen: soft. Vascular:                                      Anesthesia Plan      general     ASA 3     (Iv zofran within 30 min of closing )  Induction: intravenous. BIS  MIPS: Postoperative opioids intended and Prophylactic antiemetics administered. Anesthetic plan and risks discussed with patient. Use of blood products discussed with patient whom. Plan discussed with CRNA.     Attending anesthesiologist reviewed and agrees with Pre Eval content              Tyler Segovia MD   8/5/2020

## 2020-08-05 NOTE — OP NOTE
Operative Note    NEUROSURGICAL DEPARTMENT OPERATIVE REPORT    NAME OF SURGEON:  LYDIA ROUSE DO    DATE OF SERVICE: 8/5/2020    PREOPERATIVE DIAGNOSES   1. L3- L4 spondylolisthesis with canal stenosis  2. L4-5 severe foraminal stenosis  3. Intractable low back and lower extremity pain    POSTOPERATIVE DIAGNOSES   SAME AS ABOVE    OPERATIVE PROCEDURES  1. Left L3 and L4 bull-laminotomies with left L3- L4 complete facetectomy and  left foraminotomy for decompression of the dural sac in the nerve  root utilizing the operative microscope, the tubular retractor, and  microdisection technique. 2. Left L4 and L5 bull-laminotomies with left L4- L5 complete facetectomy and  left foraminotomy for decompression of the dural sac in the nerve  root utilizing the operative microscope, the tubular retractor, and  microsection technique. 3. L3- L4 and L4- L5 posterior lumbar interbody fusion performed via the transforaminal route utilizing autograft bone, allograft bone, and autologous bone marrow aspirate. 4.  Insertion of the intervertebral biochemical device, L3- L4 and L4- L5 . It is a PEEK interbody spacer, Elevate. 5.  L3, L4 and L5 internal fixation utilizing percutaneous pedicle screws and rods. 6.  Aspiration of autologous bone marrow utilizing bone marrow needle and  syringe. 7.  Open reduction of spondylolisthesis, L3- L4.    SURGEON  Anthony Chanel,     FIRST ASSIST   Jay Gomez    Estimated blood loss:  Less than 100 ml    DESCRIPTION OF PROCEDURE   The patient was brought to the operating room, where general endotracheal  anesthesia was induced. The patient was positioned on the operating table prone. The  solid Thomas-like table was utilized for positioning. All pressure points were  carefully checked and padded. The C-arm fluoroscope was positioned and  draped. The operating microscope was prepositioned and draped.  The  lumbosacral region was prepped for a full 10 minutes with Betadine removed with various rongeurs and curettes. The cartilaginous portion of the endplates were removed utilizing reverse-angle curettes. The distractor was then placed into the disk space, and the disk space was elevated. Moving over to the contralateral right side, the screws were placed. The  screws were inserted over the previously placed K-wires. A 6.5 x 50 mm pedicle screw was place at L3, a 6.5 x 50 mm screw was placed at L4 and a 6.5 x 50 mm pedicle screw was placed at L5. Using the ned placement device a 75 mm ned was placed. This ned was temporarily secured while the distractor was in place. Going back to the original side, the distractor was removed and additional disc space work was completed. The Elevate trial was then placed. The top loading nut on the other side was loosened and the trial was then opened and expanded to 11 mm. The  screw on the contralateral side was then firmly tightened. The Elevate trial  was then closed and subsequently removed. At this point, the wound was  copiously irrigated with antibiotic solution. The previously harvested  autograft was packed into the 23 x 7 x10 mm Elevate interbody spacer. Crushed  cancellous mixed with the previously harvested bone marrow aspirate was then  impacted into the anterior portion of the interspace. An extra extra small BMP sponge with autograft was placed in the disc space. The Elevate implant was then placed into the interspace without any issues. Once the fluoroscopic analysis proved the implant to be in excellent  position, the nerve roots were reexplored and found to be free from any  residual compression. Additional bone was placed. Hemostasis was achieved and the retractor was removed. At this point attention was drawn to the L3- L4 level.  Using the exact same techniques as before the dural sac and roots were decompressed, the discectomy was completed and a 23 x 8 mm  x 10 mm elevate implant placed along with BMP, autograft and allograft. Once the fluoroscopic analysis proved the second implant to be in excellent  position, the nerve roots were reexplored and found to be free from any  residual compression. Additional bone was placed. At this point, the pedicle screws on the original side were then subsequently placed using the previously positioned K-wires. A 6.5 mm  x 50 mm pedicle screw was placed at L3. A 6.5 mm  x 50 mm pedicle screw was placed at L4. A 6.5 mm  x 50 mm pedicle screw was placed at L5. Using the ned placement device a 75 mm ned was placed. The ned was secured down with top-loading nuts and was final tightened. Final tightening was also done on the right side as well. Again the wound was copiously irrigated with antibiotic, and meticulous  hemostasis was assured with bipolar cautery and thrombin-soaked Gelfoam. The tubular retractor was subsequently removed, and all wounds were closed in layered fashion with 2-0 Vicryl and 3-0 Vicryl suture. Dressings were applied. The patient was  returned to the stretcher in the supine position, and was extubated relatively easy. The patient returned to the recovery room in stable condition and was noted to be  moving all extremities well after surgery.

## 2020-08-05 NOTE — INTERVAL H&P NOTE
Update History & Physical    The patient's History and Physical of July 16, 2020 was reviewed with the patient and I examined the patient. There was no change. The surgical site was confirmed by the patient and me. Plan: The risks, benefits, expected outcome, and alternative to the recommended procedure have been discussed with the patient. Patient understands and wants to proceed with the procedure.      Electronically signed by Sherren Settle, DO on 8/5/2020 at 6:42 AM

## 2020-08-06 LAB
ANION GAP SERPL CALCULATED.3IONS-SCNC: 14 MMOL/L (ref 7–19)
BASOPHILS ABSOLUTE: 0 K/UL (ref 0–0.2)
BASOPHILS RELATIVE PERCENT: 0.2 % (ref 0–1)
BUN BLDV-MCNC: 19 MG/DL (ref 6–20)
CALCIUM SERPL-MCNC: 9.3 MG/DL (ref 8.6–10)
CHLORIDE BLD-SCNC: 94 MMOL/L (ref 98–111)
CO2: 26 MMOL/L (ref 22–29)
CREAT SERPL-MCNC: 0.5 MG/DL (ref 0.5–0.9)
EOSINOPHILS ABSOLUTE: 0 K/UL (ref 0–0.6)
EOSINOPHILS RELATIVE PERCENT: 0.1 % (ref 0–5)
GFR AFRICAN AMERICAN: >59
GFR NON-AFRICAN AMERICAN: >60
GLUCOSE BLD-MCNC: 108 MG/DL (ref 74–109)
HCT VFR BLD CALC: 36.1 % (ref 37–47)
HEMOGLOBIN: 11.4 G/DL (ref 12–16)
IMMATURE GRANULOCYTES #: 0.1 K/UL
LYMPHOCYTES ABSOLUTE: 1.1 K/UL (ref 1.1–4.5)
LYMPHOCYTES RELATIVE PERCENT: 6.1 % (ref 20–40)
MCH RBC QN AUTO: 31.4 PG (ref 27–31)
MCHC RBC AUTO-ENTMCNC: 31.6 G/DL (ref 33–37)
MCV RBC AUTO: 99.4 FL (ref 81–99)
MONOCYTES ABSOLUTE: 1.4 K/UL (ref 0–0.9)
MONOCYTES RELATIVE PERCENT: 7.8 % (ref 0–10)
NEUTROPHILS ABSOLUTE: 15.8 K/UL (ref 1.5–7.5)
NEUTROPHILS RELATIVE PERCENT: 85.2 % (ref 50–65)
PDW BLD-RTO: 13.2 % (ref 11.5–14.5)
PLATELET # BLD: 392 K/UL (ref 130–400)
PMV BLD AUTO: 9.4 FL (ref 9.4–12.3)
POTASSIUM REFLEX MAGNESIUM: 4.8 MMOL/L (ref 3.5–5)
RBC # BLD: 3.63 M/UL (ref 4.2–5.4)
SODIUM BLD-SCNC: 134 MMOL/L (ref 136–145)
WBC # BLD: 18.5 K/UL (ref 4.8–10.8)

## 2020-08-06 PROCEDURE — 97165 OT EVAL LOW COMPLEX 30 MIN: CPT

## 2020-08-06 PROCEDURE — 97530 THERAPEUTIC ACTIVITIES: CPT

## 2020-08-06 PROCEDURE — 2580000003 HC RX 258: Performed by: NEUROLOGICAL SURGERY

## 2020-08-06 PROCEDURE — 85025 COMPLETE CBC W/AUTO DIFF WBC: CPT

## 2020-08-06 PROCEDURE — 97161 PT EVAL LOW COMPLEX 20 MIN: CPT

## 2020-08-06 PROCEDURE — 99024 POSTOP FOLLOW-UP VISIT: CPT | Performed by: NEUROLOGICAL SURGERY

## 2020-08-06 PROCEDURE — 6370000000 HC RX 637 (ALT 250 FOR IP): Performed by: NEUROLOGICAL SURGERY

## 2020-08-06 PROCEDURE — 6360000002 HC RX W HCPCS: Performed by: NEUROLOGICAL SURGERY

## 2020-08-06 PROCEDURE — 97116 GAIT TRAINING THERAPY: CPT

## 2020-08-06 PROCEDURE — 36415 COLL VENOUS BLD VENIPUNCTURE: CPT

## 2020-08-06 PROCEDURE — 80048 BASIC METABOLIC PNL TOTAL CA: CPT

## 2020-08-06 PROCEDURE — 1210000000 HC MED SURG R&B

## 2020-08-06 RX ADMIN — FAMOTIDINE 20 MG: 20 TABLET ORAL at 08:43

## 2020-08-06 RX ADMIN — HYDROCODONE BITARTRATE AND ACETAMINOPHEN 1 TABLET: 10; 325 TABLET ORAL at 15:44

## 2020-08-06 RX ADMIN — HYDROCODONE BITARTRATE AND ACETAMINOPHEN 1 TABLET: 10; 325 TABLET ORAL at 19:46

## 2020-08-06 RX ADMIN — PREGABALIN 50 MG: 50 CAPSULE ORAL at 20:28

## 2020-08-06 RX ADMIN — HYDROMORPHONE HYDROCHLORIDE 1 MG: 1 INJECTION, SOLUTION INTRAMUSCULAR; INTRAVENOUS; SUBCUTANEOUS at 13:20

## 2020-08-06 RX ADMIN — SODIUM CHLORIDE, PRESERVATIVE FREE 10 ML: 5 INJECTION INTRAVENOUS at 20:28

## 2020-08-06 RX ADMIN — Medication: at 04:20

## 2020-08-06 RX ADMIN — HYDROCHLOROTHIAZIDE 25 MG: 12.5 CAPSULE ORAL at 08:42

## 2020-08-06 RX ADMIN — BUSPIRONE HYDROCHLORIDE 5 MG: 5 TABLET ORAL at 08:43

## 2020-08-06 RX ADMIN — METHOCARBAMOL TABLETS 1500 MG: 750 TABLET, COATED ORAL at 08:43

## 2020-08-06 RX ADMIN — BUSPIRONE HYDROCHLORIDE 5 MG: 5 TABLET ORAL at 20:28

## 2020-08-06 RX ADMIN — HYDROMORPHONE HYDROCHLORIDE 1 MG: 1 INJECTION, SOLUTION INTRAMUSCULAR; INTRAVENOUS; SUBCUTANEOUS at 17:32

## 2020-08-06 RX ADMIN — HYDROCODONE BITARTRATE AND ACETAMINOPHEN 1 TABLET: 10; 325 TABLET ORAL at 09:59

## 2020-08-06 RX ADMIN — HYDROMORPHONE HYDROCHLORIDE 1 MG: 1 INJECTION, SOLUTION INTRAMUSCULAR; INTRAVENOUS; SUBCUTANEOUS at 22:50

## 2020-08-06 RX ADMIN — PREGABALIN 50 MG: 50 CAPSULE ORAL at 08:43

## 2020-08-06 RX ADMIN — METHOCARBAMOL TABLETS 1500 MG: 750 TABLET, COATED ORAL at 20:28

## 2020-08-06 RX ADMIN — FAMOTIDINE 20 MG: 20 TABLET ORAL at 20:28

## 2020-08-06 RX ADMIN — METHOCARBAMOL TABLETS 1500 MG: 750 TABLET, COATED ORAL at 13:19

## 2020-08-06 ASSESSMENT — PAIN SCALES - GENERAL
PAINLEVEL_OUTOF10: 9
PAINLEVEL_OUTOF10: 5
PAINLEVEL_OUTOF10: 7
PAINLEVEL_OUTOF10: 8
PAINLEVEL_OUTOF10: 4
PAINLEVEL_OUTOF10: 9
PAINLEVEL_OUTOF10: 8
PAINLEVEL_OUTOF10: 5
PAINLEVEL_OUTOF10: 7
PAINLEVEL_OUTOF10: 8

## 2020-08-06 ASSESSMENT — PAIN DESCRIPTION - PAIN TYPE
TYPE: SURGICAL PAIN
TYPE: ACUTE PAIN
TYPE: SURGICAL PAIN

## 2020-08-06 ASSESSMENT — PAIN - FUNCTIONAL ASSESSMENT
PAIN_FUNCTIONAL_ASSESSMENT: PREVENTS OR INTERFERES SOME ACTIVE ACTIVITIES AND ADLS
PAIN_FUNCTIONAL_ASSESSMENT: PREVENTS OR INTERFERES SOME ACTIVE ACTIVITIES AND ADLS

## 2020-08-06 ASSESSMENT — PAIN DESCRIPTION - LOCATION
LOCATION: BACK
LOCATION: LEG
LOCATION: BACK

## 2020-08-06 ASSESSMENT — PAIN DESCRIPTION - DESCRIPTORS
DESCRIPTORS: SORE
DESCRIPTORS: SORE
DESCRIPTORS: ACHING

## 2020-08-06 ASSESSMENT — PAIN DESCRIPTION - ORIENTATION
ORIENTATION: LOWER
ORIENTATION: LOWER
ORIENTATION: LEFT

## 2020-08-06 NOTE — PROGRESS NOTES
Occupational Therapy   Occupational Therapy Initial Assessment  Date: 2020   Patient Name: Paulette Foster  MRN: [id number]     : 1965    Date of Service: 2020    Discharge Recommendations:          Assessment   Performance deficits / Impairments: Decreased ADL status  Assessment: Will progress as tolerated  Treatment Diagnosis: TLIF L3-L4  Prognosis: Good  Decision Making: Low Complexity  REQUIRES OT FOLLOW UP: Yes  Activity Tolerance  Activity Tolerance: Patient Tolerated treatment well           Patient Diagnosis(es): There were no encounter diagnoses. has a past medical history of Anxiety, Asthma, Back pain, Colon polyps, DDD (degenerative disc disease), cervical, Depression, Essential hypertension, GERD (gastroesophageal reflux disease), Hiatal hernia, HTN (hypertension), Post-menopausal, Shoulder pain, and Tobacco abuse.   has a past surgical history that includes Gallbladder surgery; Lap Band (2009); hernia repair; lumbar fusion (); and Lumbar spine surgery (N/A, 2020).     Treatment Diagnosis: TLIF L3-L4      Restrictions  Restrictions/Precautions  Restrictions/Precautions: General Precautions, Surgical Protocols  Required Braces or Orthoses?: Yes  Required Braces or Orthoses  Spinal: Lumbar Corset  Spinal Other: LSO  Position Activity Restriction  Spinal Precautions: No Bending, No Lifting, No Twisting    Subjective   General  Chart Reviewed: Yes  Patient assessed for rehabilitation services?: Yes  Family / Caregiver Present: No  Diagnosis: TLIF L3-4  General Comment  Comments: Pt. up in the chair  Patient Currently in Pain: Yes  Pain Assessment  Pain Assessment: 0-10  Pain Level: 5  Pain Type: Acute pain  Pain Location: Leg  Pain Orientation: Left  Pain Descriptors: Aching  Functional Pain Assessment: Prevents or interferes some active activities and ADLs  Response to Pain Intervention: Patient Satisfied  Vital Signs  Patient Currently in Pain: Yes  Social/Functional History  Social/Functional History  Lives With: Spouse  Type of Home: House  Home Layout: One level  Home Access: Stairs to enter with rails  Entrance Stairs - Number of Steps: 2  Bathroom Shower/Tub: Tub/Shower unit, Shower chair with back  Bathroom Toilet: Standard  Home Equipment: Rolling walker, Cane  ADL Assistance: Independent  Ambulation Assistance: Independent  Transfer Assistance: Independent       Objective   Vision: Impaired  Vision Exceptions: Wears glasses for reading  Hearing: Within functional limits    Orientation  Overall Orientation Status: Within Normal Limits  Observation/Palpation  Posture: Good  Observation: LSO, IV, PCA, O2 by nasal cannula     ADL  Feeding: Independent  Grooming: Independent  UE Bathing: Supervision  LE Bathing: Minimal assistance  UE Dressing: Supervision  LE Dressing: Minimal assistance  Toileting: Contact guard assistance           Transfers  Stand Step Transfers: Contact guard assistance  Sit to stand: Stand by assistance;Contact guard assistance     Cognition  Overall Cognitive Status: WFL                 LUE AROM (degrees)  LUE AROM : WNL  RUE AROM (degrees)  RUE AROM : WNL  LUE Strength  Gross LUE Strength: WFL  RUE Strength  Gross RUE Strength: WFL                   Plan   Plan  Times per week: 3-5x/week  Times per day: Daily    G-Code     OutComes Score                                                  AM-PAC Score             Goals  Short term goals  Time Frame for Short term goals: 1 week  Short term goal 1: Modified I with toilet tfers  Short term goal 2: Modified I with seated LB dsg  Short term goal 3: Supervision with clothing mgmt in standing  Long term goals  Long term goal 1: Return to PLOF       Therapy Time   Individual Concurrent Group Co-treatment   Time In           Time Out           Minutes                   Debbie Apple OTR/L

## 2020-08-06 NOTE — PLAN OF CARE
Problem: Pain:  Goal: Pain level will decrease  Description: Pain level will decrease  8/6/2020 1630 by William Reis RN  Outcome: Ongoing  8/6/2020 0506 by Prema Valentine RN  Outcome: Ongoing     Problem: Pain:  Goal: Control of acute pain  Description: Control of acute pain  8/6/2020 1630 by William Reis RN  Outcome: Ongoing  8/6/2020 0506 by Prema Valentine RN  Outcome: Ongoing     Problem: Pain:  Goal: Control of chronic pain  Description: Control of chronic pain  8/6/2020 1630 by William Reis RN  Outcome: Ongoing  8/6/2020 0506 by Prema Valentine RN  Outcome: Ongoing     Problem: Falls - Risk of:  Goal: Will remain free from falls  Description: Will remain free from falls  8/6/2020 1630 by William Reis RN  Outcome: Ongoing  8/6/2020 0506 by Prema Valentine RN  Outcome: Ongoing     Problem: Falls - Risk of:  Goal: Absence of physical injury  Description: Absence of physical injury  8/6/2020 1630 by William Reis RN  Outcome: Ongoing  8/6/2020 0506 by Prema Valentine RN  Outcome: Ongoing     Problem: Activity:  Goal: Ability to avoid complications of mobility impairment will improve  Description: Ability to avoid complications of mobility impairment will improve  8/6/2020 1630 by William Reis RN  Outcome: Ongoing  8/6/2020 0506 by Prema Valentine RN  Outcome: Ongoing     Problem:  Activity:  Goal: Ability to tolerate increased activity will improve  Description: Ability to tolerate increased activity will improve  8/6/2020 1630 by William Reis RN  Outcome: Ongoing  8/6/2020 0506 by Prema Valentine RN  Outcome: Ongoing     Problem: Fluid Volume:  Goal: Maintenance of adequate hydration will improve  Description: Maintenance of adequate hydration will improve  8/6/2020 1630 by William Reis RN  Outcome: Ongoing  8/6/2020 0506 by Prema Valentine RN  Outcome: Ongoing     Problem: Nutritional:  Goal: Ability to attain and maintain optimal nutritional status will improve  Description: Ability to attain and maintain optimal nutritional status will improve  8/6/2020 1630 by Sharita Lucia RN  Outcome: Ongoing  8/6/2020 0506 by Za Cabral RN  Outcome: Ongoing

## 2020-08-06 NOTE — PROGRESS NOTES
Physical Therapy    Facility/Department: [facility] SURG SERVICES  Initial Assessment    NAME: Ronak Christian  : 1965  MRN: [id number]    Date of Service: 2020    Discharge Recommendations:           Assessment   Body structures, Functions, Activity limitations: Decreased functional mobility ; Decreased ROM; Decreased strength;Decreased sensation; Increased pain;Decreased balance  Assessment: Pt. will benefit from acute care PT. Pt. states she feels a little off balance, but appears unsteady because she is anticipating more pain. Pt. tolerated amb. in hallway well, but she would benefit from RW use. Pt. not wanting to use RW at this time. Pt. safe to mobilize with staff and use of gait belt. Anticipat ept. will progress well with therapy and be able to d/c home with family A. Treatment Diagnosis: impaired gait and mobility  Prognosis: Excellent  Decision Making: Low Complexity  PT Education: Goals;PT Role;Plan of Care;Precautions;Gait Training;General Safety;Transfer Training;Functional Mobility Training  Patient Education: use of call light, staff A, use of LSO  Barriers to Learning: none noted  REQUIRES PT FOLLOW UP: Yes  Activity Tolerance  Activity Tolerance: Patient Tolerated treatment well       Patient Diagnosis(es): There were no encounter diagnoses. has a past medical history of Anxiety, Asthma, Back pain, Colon polyps, DDD (degenerative disc disease), cervical, Depression, Essential hypertension, GERD (gastroesophageal reflux disease), Hiatal hernia, HTN (hypertension), Post-menopausal, Shoulder pain, and Tobacco abuse.   has a past surgical history that includes Gallbladder surgery; Lap Band (2009); hernia repair; lumbar fusion (); and Lumbar spine surgery (N/A, 2020).     Restrictions  Restrictions/Precautions  Restrictions/Precautions: Fall Risk, General Precautions, Surgical Protocols  Required Braces or Orthoses?: Yes  Required Braces or Orthoses  Spinal: Lumbar Corset  Spinal Other: LSO  Position Activity Restriction  Spinal Precautions: No Bending, No Lifting, No Twisting  Vision/Hearing  Vision: Impaired  Vision Exceptions: Wears glasses for reading  Hearing: Within functional limits     Subjective  General  Chart Reviewed: Yes  Patient assessed for rehabilitation services?: Yes  Response To Previous Treatment: Not applicable  Family / Caregiver Present: No  Referring Practitioner: Sherren Settle, DO  Referral Date : 08/06/20  Diagnosis: L3- L4 spondylolisthesis with canal stenosis,  L4-5 severe foraminal stenosis, Intractable low back and lower extremity pain  Follows Commands: Within Functional Limits  General Comment  Comments: 8/5/20 TLIF L3-4, L4-5  Subjective  Subjective: Pt. states she is doing so much better and has been taking dilaudid, but she thinks they will wean her off today. States she feels like she is having some presurgery pain.   Pain Screening  Patient Currently in Pain: Yes  Pain Assessment  Pain Assessment: 0-10  Pain Level: 8  Pain Type: Surgical pain  Pain Location: Back  Pain Orientation: Lower  Pain Descriptors: Sore  Functional Pain Assessment: Prevents or interferes some active activities and ADLs  Response to Pain Intervention: Patient Satisfied  Vital Signs  Patient Currently in Pain: Yes  Oxygen Therapy  O2 Device: Nasal cannula  Pre Treatment Pain Screening  Intervention List: Patient able to continue with treatment  Comments / Details: using PCA    Orientation  Orientation  Overall Orientation Status: Within Functional Limits  Social/Functional History  Social/Functional History  Lives With: Spouse  Type of Home: House  Home Layout: One level  Home Access: Stairs to enter with rails  Entrance Stairs - Number of Steps: 2  Bathroom Shower/Tub: Tub/Shower unit, Shower chair with back  Bathroom Toilet: Standard  Home Equipment: Rolling walker, Cane  ADL Assistance: Independent  Ambulation Assistance: Independent  Transfer Assistance: Independent  Cognition Cognition  Overall Cognitive Status: WFL    Objective     Observation/Palpation  Posture: Good  Observation: LSO, IV, PCA, O2 by nasal cannula    AROM RLE (degrees)  RLE AROM: WFL  AROM LLE (degrees)  LLE AROM : WFL  Strength RLE  Strength RLE: WFL  Comment: grossly 4/5  Strength LLE  Strength LLE: WFL  Comment: grossly 4/5     Sensation  Overall Sensation Status: Impaired(some tingling, comes and goes)  Bed mobility  Supine to Sit: Unable to assess  Sit to Supine: Unable to assess  Comment: already up in chair  Transfers  Sit to Stand: Contact guard assistance  Stand to sit: Contact guard assistance  Ambulation  Ambulation?: Yes  Ambulation 1  Surface: level tile  Device: No Device  Assistance: Contact guard assistance  Quality of Gait: unsteady  Gait Deviations: Slow Carla;Decreased step length;Decreased step height;Decreased arm swing;Decreased head and trunk rotation  Distance: 200'  Comments: pt. occasionally reaches to touch rail in hallway. Pt. stood in hallway to single leg stance to bend opposite knee, CGA. Balance  Posture: Good  Sitting - Static: Good;+  Sitting - Dynamic: Good;-  Standing - Static: Fair  Standing - Dynamic: Fair;-  Exercises  Comments: ankles, LAQ x 10 reps     Plan   Plan  Times per week: 3-7  Times per day: Daily  Plan weeks: 2  Current Treatment Recommendations: Strengthening, ROM, Balance Training, Functional Mobility Training, Transfer Training, Gait Training, Stair training, Pain Management, Safety Education & Training, Positioning, Equipment Evaluation, Education, & procurement, Patient/Caregiver Education & Training  Plan Comment: cont. PT per POC.   Safety Devices  Type of devices: Gait belt, Patient at risk for falls, Nurse notified, Left in chair, Call light within reach    G-Code       OutComes Score                                                  AM-PAC Score             Goals  Short term goals  Time Frame for Short term goals: 2 wks  Short term goal 1: supine to sit indep  Short term goal 2: sit to stand indep  Short term goal 3: amb. 300' indep  Short term goal 4: up/down 3 stairs SBA  Patient Goals   Patient goals : go home       Therapy Time   Individual Concurrent Group Co-treatment   Time In           Time Out           Minutes                   Ricki Martínez PT    Electronically signed by Ricki Martínez PT on 8/6/2020 at 10:13 AM

## 2020-08-06 NOTE — PROGRESS NOTES
[facility]  Post Op   Daily Progress Note        SUBJECTIVE:  Patient seen and examined. Mrs. Jorge Bustillos is now s/p TLIF of L3-4, L4-5 for spondylolisthesis of L3-4 and foraminal stenosis and is post op day #1. She states that her left leg pain is now gone, she does have typical post-operative pain. She is up and sitting in the chair this morning.             Past Medical History:   Diagnosis Date    Anxiety     Asthma     Back pain     Colon polyps     DDD (degenerative disc disease), cervical     Depression     Essential hypertension 10/19/2016    GERD (gastroesophageal reflux disease)     Hiatal hernia     HTN (hypertension)     Post-menopausal     Shoulder pain     Tobacco abuse 10/19/2016       Past Surgical History:   Procedure Laterality Date    GALLBLADDER SURGERY      HERNIA REPAIR      LAP BAND  08/28/2009    LUMBAR FUSION  2020    LUMBAR SPINE SURGERY N/A 8/5/2020    TLIF L3-4, L4-5 performed by Ricarda Ambriz DO at [address] [place] [address]       Current Facility-Administered Medications   Medication Dose Route Frequency Provider Last Rate Last Dose    ALPRAZolam [facility]) tablet 1 mg  1 mg Oral Nightly PRN Ricarda Ambriz, DO        busPIRone (BUSPAR) tablet 5 mg  5 mg Oral BID Ricarda Ambriz, DO   5 mg at 08/05/20 2207    famotidine (PEPCID) tablet 20 mg  20 mg Oral BID Ricarda Ambriz, DO   20 mg at 08/05/20 2206    hydroCHLOROthiazide (MICROZIDE) capsule 25 mg  25 mg Oral Daily Ricarda Ambriz, DO        HYDROcodone-acetaminophen [facility])  MG per tablet 1 tablet  1 tablet Oral Q4H PRN Ricarda Ambriz, DO        lisinopril (PRINIVIL;ZESTRIL) tablet 10 mg  10 mg Oral Daily Ricarda Balding, DO        methocarbamol (ROBAXIN) tablet 750 mg  750 mg Oral 4x Daily PRN Ricarda Ambriz, DO   750 mg at 08/05/20 1307    pregabalin (LYRICA) capsule 50 mg  50 mg Oral BID Ricarda Balmelania, DO   50 mg at 08/05/20 2209    0.9 % sodium chloride infusion   Intravenous Continuous Ricarda Ambriz, DO 75 mL/hr at 08/05/20 1552      sodium chloride flush 0.9 % injection 10 mL  10 mL Intravenous 2 times per day Raymond Ours, DO        sodium chloride flush 0.9 % injection 10 mL  10 mL Intravenous PRN Raymond Ours, DO        methocarbamol (ROBAXIN) tablet 1,500 mg  1,500 mg Oral Q6H Raymond Ours, DO   1,500 mg at 08/05/20 2209    HYDROmorphone HCl PF (DILAUDID) injection 1 mg  1 mg Intravenous Q3H PRN Raymond Ours, DO        naloxone [facility]) injection 0.4 mg  0.4 mg Intravenous PRN Raymond Ours, DO        diphenhydrAMINE (BENADRYL) injection 25 mg  25 mg Intravenous Q6H PRN Raymond Ours, DO        HYDROmorphone HCl (DILAUDID) 0.2 mg/mL PCA 50 mL   Intravenous Continuous Raymond Ours, DO           Allergies:  Daypro [oxaprozin]; Doxepin; Gabapentin; Penicillins; Elavil [amitriptyline hcl]; Erythromycin; Meloxicam; Percocet [oxycodone-acetaminophen]; Soma [carisoprodol]; Citalopram; Demerol hcl [meperidine]; and Statins    Social History:   Social History     Tobacco Use   Smoking Status Current Every Day Smoker    Packs/day: 1.00    Types: Cigarettes    Start date: 10/19/1988   Smokeless Tobacco Never Used     Social History     Substance and Sexual Activity   Alcohol Use Not Currently    Comment: rare         Family History:   Family History   Problem Relation Age of Onset   [facility] Parkinsonism Mother     Alzheimer's Disease Mother     Stroke Father     Substance Abuse Brother        OBJECTIVE    PHYSICAL EXAM:  Vitals:    08/06/20 0635   BP: 118/67   Pulse: 72   Resp: 16   Temp: 96.6 °F (35.9 °C)   SpO2: 99%     Constitutional: She appears well-developed and well-nourished.    Eyes - conjunctiva normal.  Pupils react to light  Ear, nose, throat -hearing intact to finger rub, No scars, masses, or lesions over external nose or ears, no atrophy of tongue  Neck-symmetric, no masses noted, no jugular vein distension  Respiration- chest wall appears symmetric, good expansion, normal effort without use of accessory muscles  Musculoskeletal - no significant wasting of muscles noted, no bony deformities, gait no gross ataxia  Extremities-no clubbing, cyanosis or edema  Skin - warm, dry, and intact. No rash, erythema, or pallor. Psychiatric - mood, affect, and behavior appear normal.     Neurologic Examination  Awake, Alert and oriented x 3  Normal speech pattern, following commands  Motor 5/5 all extremities  No deficits to light touch       Wound: clean, dry and intact    DATA and IMAGING:    Nursing/pcp notes, imaging, labs, and vitals reviewed. PT,OT and/or speech notes reviewed    Lab Results   Component Value Date    WBC 18.5 (H) 08/06/2020    HGB 11.4 (L) 08/06/2020    HCT 36.1 (L) 08/06/2020    MCV 99.4 (H) 08/06/2020     08/06/2020     Lab Results   Component Value Date     (L) 08/06/2020    K 4.8 08/06/2020    CL 94 (L) 08/06/2020    CO2 26 08/06/2020    BUN 19 08/06/2020    CREATININE 0.5 08/06/2020    GLUCOSE 108 08/06/2020    CALCIUM 9.3 08/06/2020    PROT 6.4 (L) 07/31/2020    LABALBU 3.9 07/31/2020    BILITOT <0.2 07/31/2020    ALKPHOS 90 07/31/2020    AST 13 07/31/2020    ALT 17 07/31/2020    LABGLOM >60 08/06/2020    GFRAA >59 08/06/2020     Lab Results   Component Value Date    INR 0.88 07/31/2020    PROTIME 11.8 (L) 07/31/2020         ASSESSMENT:  Mrs. Jroge Bustillos is now s/p TLIF of L3-4, L4-5 for spondylolisthesis of L3-4 and foraminal stenosis and is post op day #1. PLAN:  -D/C PCA, IVF  -PRN Oral and IV pain medications  -Back brace when OOB  -PT to ambulate         DAYSI Becerril       Neurosurgery Attending  I have reviewed this case thoroughly with the provider above. I have seen and evaluated this patient myself. I have reviewed all the imaging studies, labs, notes etc. within the chart. I agree. LLE pain improving already. Expected post-op pain. PT, OT today, DC PCA, IVF.         Ricarda Ambriz DO

## 2020-08-07 VITALS
BODY MASS INDEX: 38.65 KG/M2 | WEIGHT: 232 LBS | DIASTOLIC BLOOD PRESSURE: 77 MMHG | HEIGHT: 65 IN | RESPIRATION RATE: 20 BRPM | SYSTOLIC BLOOD PRESSURE: 125 MMHG | HEART RATE: 87 BPM | OXYGEN SATURATION: 97 % | TEMPERATURE: 96.9 F

## 2020-08-07 LAB
ALBUMIN SERPL-MCNC: 4 G/DL (ref 3.5–5.2)
ALP BLD-CCNC: 103 U/L (ref 35–104)
ALT SERPL-CCNC: 32 U/L (ref 5–33)
ANION GAP SERPL CALCULATED.3IONS-SCNC: 13 MMOL/L (ref 7–19)
AST SERPL-CCNC: 43 U/L (ref 5–32)
BILIRUB SERPL-MCNC: 0.4 MG/DL (ref 0.2–1.2)
BUN BLDV-MCNC: 13 MG/DL (ref 6–20)
CALCIUM SERPL-MCNC: 10.3 MG/DL (ref 8.6–10)
CHLORIDE BLD-SCNC: 98 MMOL/L (ref 98–111)
CO2: 29 MMOL/L (ref 22–29)
CREAT SERPL-MCNC: 0.6 MG/DL (ref 0.5–0.9)
GFR AFRICAN AMERICAN: >59
GFR NON-AFRICAN AMERICAN: >60
GLUCOSE BLD-MCNC: 109 MG/DL (ref 74–109)
HCT VFR BLD CALC: 36.9 % (ref 37–47)
HEMOGLOBIN: 11.7 G/DL (ref 12–16)
MCH RBC QN AUTO: 31.9 PG (ref 27–31)
MCHC RBC AUTO-ENTMCNC: 31.7 G/DL (ref 33–37)
MCV RBC AUTO: 100.5 FL (ref 81–99)
PDW BLD-RTO: 13.2 % (ref 11.5–14.5)
PLATELET # BLD: 373 K/UL (ref 130–400)
PMV BLD AUTO: 9.6 FL (ref 9.4–12.3)
POTASSIUM SERPL-SCNC: 4.9 MMOL/L (ref 3.5–5)
RBC # BLD: 3.67 M/UL (ref 4.2–5.4)
SODIUM BLD-SCNC: 140 MMOL/L (ref 136–145)
TOTAL PROTEIN: 6.7 G/DL (ref 6.6–8.7)
WBC # BLD: 13.7 K/UL (ref 4.8–10.8)

## 2020-08-07 PROCEDURE — 99024 POSTOP FOLLOW-UP VISIT: CPT | Performed by: NURSE PRACTITIONER

## 2020-08-07 PROCEDURE — 80053 COMPREHEN METABOLIC PANEL: CPT

## 2020-08-07 PROCEDURE — 6360000002 HC RX W HCPCS: Performed by: NEUROLOGICAL SURGERY

## 2020-08-07 PROCEDURE — 6370000000 HC RX 637 (ALT 250 FOR IP): Performed by: NEUROLOGICAL SURGERY

## 2020-08-07 PROCEDURE — 36415 COLL VENOUS BLD VENIPUNCTURE: CPT

## 2020-08-07 PROCEDURE — 85027 COMPLETE CBC AUTOMATED: CPT

## 2020-08-07 RX ORDER — METHOCARBAMOL 750 MG/1
750 TABLET, FILM COATED ORAL 3 TIMES DAILY
Qty: 90 TABLET | Refills: 0 | Status: SHIPPED | OUTPATIENT
Start: 2020-08-07 | End: 2020-09-06

## 2020-08-07 RX ORDER — HYDROCODONE BITARTRATE AND ACETAMINOPHEN 10; 325 MG/1; MG/1
1 TABLET ORAL EVERY 4 HOURS PRN
Qty: 90 TABLET | Refills: 0 | Status: SHIPPED | OUTPATIENT
Start: 2020-08-07 | End: 2020-08-28 | Stop reason: ALTCHOICE

## 2020-08-07 RX ORDER — DOCUSATE SODIUM 100 MG/1
100 CAPSULE, LIQUID FILLED ORAL 2 TIMES DAILY PRN
Qty: 60 CAPSULE | Refills: 0 | Status: SHIPPED | OUTPATIENT
Start: 2020-08-07 | End: 2020-09-06

## 2020-08-07 RX ORDER — ONDANSETRON 4 MG/1
4 TABLET, FILM COATED ORAL DAILY PRN
Qty: 30 TABLET | Refills: 0 | Status: SHIPPED | OUTPATIENT
Start: 2020-08-07 | End: 2021-01-04

## 2020-08-07 RX ADMIN — METHOCARBAMOL TABLETS 1500 MG: 750 TABLET, COATED ORAL at 12:57

## 2020-08-07 RX ADMIN — BUSPIRONE HYDROCHLORIDE 5 MG: 5 TABLET ORAL at 07:53

## 2020-08-07 RX ADMIN — HYDROMORPHONE HYDROCHLORIDE 1 MG: 1 INJECTION, SOLUTION INTRAMUSCULAR; INTRAVENOUS; SUBCUTANEOUS at 02:49

## 2020-08-07 RX ADMIN — HYDROCODONE BITARTRATE AND ACETAMINOPHEN 1 TABLET: 10; 325 TABLET ORAL at 08:54

## 2020-08-07 RX ADMIN — HYDROCODONE BITARTRATE AND ACETAMINOPHEN 1 TABLET: 10; 325 TABLET ORAL at 12:57

## 2020-08-07 RX ADMIN — METHOCARBAMOL TABLETS 1500 MG: 750 TABLET, COATED ORAL at 07:53

## 2020-08-07 RX ADMIN — HYDROCODONE BITARTRATE AND ACETAMINOPHEN 1 TABLET: 10; 325 TABLET ORAL at 04:43

## 2020-08-07 RX ADMIN — HYDROCHLOROTHIAZIDE 25 MG: 12.5 CAPSULE ORAL at 07:52

## 2020-08-07 RX ADMIN — PREGABALIN 50 MG: 50 CAPSULE ORAL at 07:52

## 2020-08-07 RX ADMIN — LISINOPRIL 10 MG: 10 TABLET ORAL at 07:52

## 2020-08-07 RX ADMIN — HYDROCODONE BITARTRATE AND ACETAMINOPHEN 1 TABLET: 10; 325 TABLET ORAL at 00:05

## 2020-08-07 RX ADMIN — FAMOTIDINE 20 MG: 20 TABLET ORAL at 07:52

## 2020-08-07 ASSESSMENT — PAIN SCALES - GENERAL
PAINLEVEL_OUTOF10: 9
PAINLEVEL_OUTOF10: 3
PAINLEVEL_OUTOF10: 5
PAINLEVEL_OUTOF10: 7
PAINLEVEL_OUTOF10: 7
PAINLEVEL_OUTOF10: 8

## 2020-08-07 NOTE — PROGRESS NOTES
[facility]  Post Op   Daily Progress Note        SUBJECTIVE:  Patient seen and examined. Mrs. Oksana Ryder is now s/p TLIF of L3-4, L4-5 for spondylolisthesis of L3-4 and foraminal stenosis and is post op day #2. She states that her left leg pain is still gone, she does have typical post-operative pain. She is up and walking about the room today stating that she is ready to go home.          Past Medical History:   Diagnosis Date    Anxiety     Asthma     Back pain     Colon polyps     DDD (degenerative disc disease), cervical     Depression     Essential hypertension 10/19/2016    GERD (gastroesophageal reflux disease)     Hiatal hernia     HTN (hypertension)     Post-menopausal     Shoulder pain     Tobacco abuse 10/19/2016       Past Surgical History:   Procedure Laterality Date    GALLBLADDER SURGERY      HERNIA REPAIR      LAP BAND  08/28/2009    LUMBAR FUSION  2020    LUMBAR SPINE SURGERY N/A 8/5/2020    TLIF L3-4, L4-5 performed by Mark Mendoza DO at [address]       Current Facility-Administered Medications   Medication Dose Route Frequency Provider Last Rate Last Dose    ALPRAZolam (XANAX) tablet 1 mg  1 mg Oral Nightly PRN Mark Mendoza, DO        busPIRone (BUSPAR) tablet 5 mg  5 mg Oral BID Mark Mendoza, DO   5 mg at 08/07/20 0753    famotidine (PEPCID) tablet 20 mg  20 mg Oral BID Mark Mendoza, DO   20 mg at 08/07/20 0752    hydroCHLOROthiazide (MICROZIDE) capsule 25 mg  25 mg Oral Daily Mark Mendoza, DO   25 mg at 08/07/20 0752    HYDROcodone-acetaminophen (NORCO)  MG per tablet 1 tablet  1 tablet Oral Q4H PRN Mark Mendoza, DO   1 tablet at 08/07/20 0443    lisinopril (PRINIVIL;ZESTRIL) tablet 10 mg  10 mg Oral Daily Mark Mendoza, DO   10 mg at 08/07/20 0752    methocarbamol (ROBAXIN) tablet 750 mg  750 mg Oral 4x Daily PRN Mark Mendoza, DO   750 mg at 08/05/20 1307    pregabalin (LYRICA) capsule 50 mg  50 mg Oral BID Mark Mendoza, DO   50 mg at

## 2020-08-07 NOTE — DISCHARGE SUMMARY
[facility]  Discharge Summary     Patient:   July Kyle  MR#:    [id number]      YOB: 1965  Date of Evaluation:  8/7/2020  Time of Note:                          9:05 AM  Primary/Referring Physician:  Brenton Tijerina   Note Author:    DAYSI Orr        Admission Date: 8/5/2020    Discharge Date: 8/07/2020    Final Diagnoses: Spondylolisthesis at L3-L4 level [M43.16]    Procedures: TLIF of L3-4, L4-5    Consultations: PT, OT    Reason for Admission: Surgery     Hospital Course:  July Kyle was admitted with the above named diagnosis, surgery was performed and tolerated well. At the time of discharge, the patient was afebrile, vitals stable, pain was controlled with oral medication, they were tolerating a by mouth diet, and voiding appropriately. Patient Condition: Stable    Disposition: Home    Wound Instructions: Able to shower. Please keep wound dry. DO NOT SOAK WOUND    Diet: regular diet    Resume home meds:      Medication List      ASK your doctor about these medications    acetaminophen 325 MG tablet  Commonly known as:  TYLENOL     ALPRAZolam 1 MG tablet  Commonly known as:  XANAX     aspirin 325 MG tablet     BENADRYL PO     busPIRone 5 MG tablet  Commonly known as:  BUSPAR     clotrimazole 1 % cream  Commonly known as:  LOTRIMIN     DICLOFENAC PO     diclofenac sodium 1 % Gel  Commonly known as:  VOLTAREN     famotidine 20 MG tablet  Commonly known as:  PEPCID     hydroCHLOROthiazide 12.5 MG capsule  Commonly known as:  MICROZIDE     HYDROcodone-acetaminophen  MG per tablet  Commonly known as:  Norco  Take 1 tablet by mouth every 6 hours as needed for Pain for up to 30 days.  May fill 7/6/20     lisinopril 20 MG tablet  Commonly known as:  PRINIVIL;ZESTRIL     methocarbamol 750 MG tablet  Commonly known as:  Robaxin-750  Take 1 tablet by mouth 4 times daily as needed (muscle spasm)     mupirocin 2 % ointment  Commonly known as:  BACTROBAN     nystatin 477841 UNIT/GM cream  Commonly known as:  MYCOSTATIN     pregabalin 50 MG capsule  Commonly known as:  Lyrica  Take 1 capsule by mouth 2 times daily for 60 days.             Return to Clinic: wound check; 8/20/2020 at 2:15pm with [address]

## 2020-08-14 ENCOUNTER — TELEPHONE (OUTPATIENT)
Dept: NEUROSURGERY | Age: 55
End: 2020-08-14

## 2020-08-18 ASSESSMENT — ENCOUNTER SYMPTOMS
CONSTIPATION: 0
BACK PAIN: 1
BOWEL INCONTINENCE: 0

## 2020-08-19 NOTE — PROGRESS NOTES
right SI, right greater trochanteric bursa and right piriformis injected on 3/11/2020. Patient did have an improvement in her low back pain until 4/27/2020. Patient called the nurse line stating that she had an increase in pain and inflammation. She knew it was too soon for a repeat injection was wondering if there is anything that could be done. Patient requested a Medrol Dosepak. Patient was given Medrol Dosepak over the phone and instructed to call if it did not improve. Patient stated that it improved for a short period of time however walking was making the pain worse. She states that she hurts across lower back and then down bilateral lower extremities straight back to the foot. Patient states that she has noticed that she trips a lot with her right foot. She states that she feels like she is leaning and that she cannot stand straight when ambulating. Patient has known instability in low back and had wanted to postpone low back surgery. However patient is stating that she cannot take the pain any longer and she is wanting something to be done to help relieve the pain. Patient does appear to be in pain. Patient did become tearful during exam.  Patient is usually upbeat and . Patient states that pain is affecting her quality of life. Back Pain is a chronic problem. The current episode started more than 1 year ago. The problem occurs constantly. The problem has been rapidly worsening since onset. The pain is present in the gluteal, lumbar spine and sacro-iliac. The quality of the pain is described as aching, cramping, stabbing and shooting. Radiates to: BLE. The symptoms are aggravated by position, sitting, standing and twisting (Walking). Associated symptoms include leg pain, numbness, tingling and weakness. Pertinent negatives include no bladder incontinence or bowel incontinence. Risk factors include obesity.      12/2019  presents to the office for procedure follow-up and office visit. Patient had right SI and right trochanteric bursa injection with ultrasound and right piriformis on 10/23/2019. Patient sustained 50% relief for approximately 1 month before diminishing of pain. Patient stated that the injection targeted the area of pain.     Patient requested sooner appointment due to increase in low back pain. Patient had called provider at home regarding acute low back pain with numbness to right lower extremity that had started suddenly on Saturday. 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 Patient stated that she had done fine on Friday and then on Saturday she just had and a sudden acute onset of low back pain that was gripping. At the time pain medication was not controlling her pain. Patient stated that she started to have numbness to her right foot that is intermittent and that when she is having the numbness that she feels as if she may fall. Patient rated her pain a 10 out of a 10 she described it is a stabbing in the low back. Patient states that sitting on the toilet will cause an increase in the radicular pain. Patient states that she has to lean on the shopping cart when she is ambulating to help with the low back pain. Patient states that cleaning such as sweeping mopping or doing anything side to side significantly increases low back pain. MRI was ordered of the lumbar spine. Patient does have instability that was on x-ray of the lumbar spine.   However at the time the x-ray was taken patient was not having any type of radicular or nerve symptoms.     10/3/19  presents with referral from [place] [address] primary complaints of chronic low back pain.  Pain has been a gradual onset that started around 2006. [place] is aggravated by lifting bending and walking.  Patient had imaging done by Dr. Vanessa Carty in [facility] which indicated she had 2 bulging disks.  Per patient she was told that she had 2 dead disks and 2 bulging disks in the lumbar region.  She was told that the hip pain was deferred from the bulging disks and this is progressively worsened over the past 12 years. [facility] states the pain is now constant and causes a limp.  Patient has been tried on several nerve medications doxepin caused patient to sleepwalk, gabapentin caused patient to lose time, and amitriptyline made her feel as if her blood was boiling.  Patient is tried massage therapy but this causes her to become more tense.  Aggravating factors include ambulation prolonged sitting prolonged standing.  Patient states that she does not hurt in the morning but it progresses as the day goes on.     Other areas of pain that will be addressed at a later time is bilateral shoulder pain, hip pain and left knee pain.     Of note: Patient has numerous medical allergies penicillin causes hives, erythromycin causes hives, doxepin causes sleepwalking, gabapentin causes loss of memory, amitriptyline makes her feel like her blood boiling, Daypro eyes swelled shut, Soma horrible headaches, Percocet nightmares, Demerol nausea, statins muscle spasms, meloxicam irritates stomach ulcers, citalopram causes memory loss, menthol and camphor causes hives    Employment: retired     Past Medical History  Past Medical History:   Diagnosis Date    Anxiety     Asthma     Back pain     Colon polyps     DDD (degenerative disc disease), cervical     Depression     Essential hypertension 10/19/2016    GERD (gastroesophageal reflux disease)     Hiatal hernia     HTN (hypertension)     Post-menopausal     Shoulder pain     Tobacco abuse 10/19/2016       Surgery History  Past Surgical History:   Procedure Laterality Date    GALLBLADDER SURGERY      HERNIA REPAIR      LAP BAND  08/28/2009    LUMBAR FUSION  2020    LUMBAR SPINE SURGERY N/A 8/5/2020    TLIF L3-4, L4-5 performed by Bulmaro Ramirez DO at [address] History  family history includes times daily as needed      clotrimazole (LOTRIMIN) 1 % cream Apply topically as needed Apply topically 2 times daily.  diclofenac sodium 1 % GEL Apply 2 g topically 4 times daily as needed for Pain      famotidine (PEPCID) 20 MG tablet Take 20 mg by mouth 2 times daily      mupirocin (BACTROBAN) 2 % ointment Apply topically as needed Apply topically 3 times daily.  hydrochlorothiazide (MICROZIDE) 12.5 MG capsule Take 25 mg by mouth daily       lisinopril (PRINIVIL;ZESTRIL) 20 MG tablet Take 10 mg by mouth daily       nystatin (MYCOSTATIN) 447138 UNIT/GM cream Apply topically as needed Apply topically 2 times daily.  ALPRAZolam (XANAX) 1 MG tablet Take 1 mg by mouth nightly as needed for Sleep       No current facility-administered medications for this encounter. Review of Systems:  Review of Systems   Constitutional: Positive for activity change. Gastrointestinal: Negative for bowel incontinence and constipation. Genitourinary: Negative for bladder incontinence. Musculoskeletal: Positive for arthralgias, back pain, gait problem and myalgias. Negative for neck pain. Neurological: Positive for tingling, weakness and numbness. Psychiatric/Behavioral: Positive for sleep disturbance. Negative for agitation, self-injury and suicidal ideas. The patient is not nervous/anxious. 14 point ROS negative besides that noted in HPI    Physical Exam:    Vitals:    07/23/20 1455   BP: 131/77   Pulse: 86   Temp: 96.8 °F (36 °C)   SpO2: 99%   Weight: 234 lb 6 oz (106.3 kg)   Height: 5' 5\" (1.651 m)       Body mass index is 39 kg/m². Physical Exam  Vitals signs and nursing note reviewed. Constitutional:       General: She is not in acute distress. Appearance: She is well-developed. HENT:      Head: Normocephalic.       Right Ear: External ear normal.      Left Ear: External ear normal.      Nose: Nose normal.   Eyes:      Conjunctiva/sclera: Conjunctivae normal.      Pupils: Pupils are equal, round, and reactive to light. Neck:      Vascular: No JVD. Trachea: No tracheal deviation. Cardiovascular:      Rate and Rhythm: Normal rate. Pulmonary:      Effort: Pulmonary effort is normal.   Abdominal:      General: There is no distension. Tenderness: There is no abdominal tenderness. Musculoskeletal:      Lumbar back: She exhibits decreased range of motion, tenderness, pain and spasm. Comments: + slr  Patient states that she is tripping with right foot. She can flex right foot with concentration  She feels like she leans when she ambulates   Skin:     General: Skin is warm and dry. Neurological:      Mental Status: She is alert and oriented to person, place, and time. Cranial Nerves: No cranial nerve deficit. Psychiatric:         Behavior: Behavior normal.         Thought Content: Thought content normal.         Judgment: Judgment normal.         MRI exams received in the past 2 years:  Yes MRI Lumbar Spine       When 12/2019                                              Where [facility]       Imaging on chart: Yes         Imaging records requested: No     CT exam received during the last 12 months: No       When na                                              [facility]       Imaging on chart: No         Imaging records requested: No     X-ray exam received during the last 12 months: Yes XR Lumbar Spine & XR Bilateral Hips/Pelvis       When 10/2019                                              Where [facility]       Imaging on chart: Yes         Imaging records requested: No     Nerve Conduction Study/EMG:  No       When na                                              [facility]       Imaging on chart: No         Imaging records requested:  No     Physical therapy: No       When: na                                               Where na     Behavior Health No       When: na                                               Where na     Labs  Yes       When: [address]                                             [facility] [address] Vinicio Ngo    Most recent imaging, testing, and response to counseling and/or rehabilitation were reviewed with patient. [x] Yes  [] No    Labs:  Lab Results   Component Value Date     08/07/2020    K 4.9 08/07/2020    K 4.8 08/06/2020    CL 98 08/07/2020    CO2 29 08/07/2020    BUN 13 08/07/2020    CREATININE 0.6 08/07/2020    GLUCOSE 109 08/07/2020    CALCIUM 10.3 08/07/2020        Lab Results   Component Value Date    WBC 13.7 (H) 08/07/2020    HGB 11.7 (L) 08/07/2020    HCT 36.9 (L) 08/07/2020    .5 (H) 08/07/2020     08/07/2020       Assessment:  Active Problems:    Chronic bilateral low back pain with bilateral sciatica    SI (sacroiliac) joint dysfunction    Piriformis syndrome of right side    Chronic pain of both shoulders    Trochanteric bursitis of right hip    Anterolisthesis    Spondylolisthesis at L3-L4 level  Resolved Problems:    * No resolved hospital problems. *      Plan: For low back pain  Pateint was started on Lyrica 25 mg BID prior to appointment since patient has not had any side effects increase to Lyrica 50 BID. If patient does not have any side effects increase to Lyrica 75 BID. Can consider weaning after surgery. Continue Norco as previously prescribed Patient has enough medication until surgery. Patient knows medication will be placed no hold and will resume medication after patient has been released from neurosurgery. Continue robaxin as previously prescribed no refills needed at this time     [x] Follow up    [] 4 weeks   [] 6-8 weeks   [] 10-12 weeks   [x] 3 months  [] Post procedure to evaluate effectiveness of treatment  [] To evaluate medications changes made at office visit. [] To review diagnostics ordered at last visit.    [] To evaluate response to therapy    [x] For management of controlled substance  [x] Random UDS as indicated by ORT score or if indicated by abberent behaviors    Discussion: Discussed exam findings and plan of care with patient. Patient agreed with POC and questions were asked and answered. Activity: Discussed exercise as beneficial to pain reduction, encouraged daily stretching with a focus on torso strengthening. Goal:  Pain Management Goals of Therapy:   [] Resolution in pain  [x] Decrease in pain level  [x] Improvement in ADL's  [x] Increase in activities with less pain  [] Decrease in medication      Controlled substance monitoring:    [x] Discussed medication side effects, risk of tolerance and/or dependence, and/or alternative treatment  [] Discussed the detrimental effects of long term narcotic use in younger patients especially women of childbearing years. [x] No signs and symptoms of potential drug abuse or diversion were identified  [] Signs of potential drug abuse or diversion were identified   [] ORT Score   [] UDS non-compliant   [] See Note  [] Random urine drug screen sent today  [x] Random urine drug screen not completed today   [] Deferred New Patient  [x] Compliant  Takes CBD oil  [] Not Compliant see note  [] Medication agreement with provider signed today  [x] Medication agreement with provider on file under media   [] Medication regimen effective and continued   [] New patient continuing current medication while developing POC   [] On going assessment and evaluation of medication regimen  [x] Medication regimen not effective see plan for changes  [x] Satish Guevara reviewed & on file under media       CC:  DAYSI Willis CNP, 8/20/2020 at 8:16 PM    EMR dragon/transcription disclaimer: Much of this encounter note is electronic transcription/translation of spoken language to printed tach. Electronic translation of spoken language may be erroneous, or at times, nonsensical words or phrases may be inadvertently transcribed.  Although, I have reviewed the note for such errors, some may still exist.

## 2020-08-20 ENCOUNTER — OFFICE VISIT (OUTPATIENT)
Dept: NEUROSURGERY | Age: 55
End: 2020-08-20

## 2020-08-20 VITALS
SYSTOLIC BLOOD PRESSURE: 117 MMHG | HEIGHT: 65 IN | DIASTOLIC BLOOD PRESSURE: 69 MMHG | WEIGHT: 232 LBS | HEART RATE: 81 BPM | OXYGEN SATURATION: 94 % | BODY MASS INDEX: 38.65 KG/M2

## 2020-08-20 PROBLEM — Z02.89 PAIN MEDICATION AGREEMENT: Status: ACTIVE | Noted: 2020-08-20

## 2020-08-20 PROCEDURE — 99024 POSTOP FOLLOW-UP VISIT: CPT | Performed by: NURSE PRACTITIONER

## 2020-08-20 RX ORDER — PREGABALIN 25 MG/1
25 CAPSULE ORAL 3 TIMES DAILY
COMMUNITY
Start: 2020-08-12 | End: 2021-01-04

## 2020-08-20 ASSESSMENT — ENCOUNTER SYMPTOMS
RESPIRATORY NEGATIVE: 1
GASTROINTESTINAL NEGATIVE: 1
EYES NEGATIVE: 1

## 2020-08-20 NOTE — PROGRESS NOTES
needed for Constipation, Disp: 60 capsule, Rfl: 0    ondansetron (ZOFRAN) 4 MG tablet, Take 1 tablet by mouth daily as needed for Nausea or Vomiting, Disp: 30 tablet, Rfl: 0    methocarbamol (ROBAXIN-750) 750 MG tablet, Take 1 tablet by mouth 4 times daily as needed (muscle spasm), Disp: 120 tablet, Rfl: 2    acetaminophen (TYLENOL) 325 MG tablet, Take 325 mg by mouth every 6 hours as needed for Pain , Disp: , Rfl:     aspirin 325 MG tablet, Take 325 mg by mouth daily, Disp: , Rfl:     diphenhydrAMINE HCl (BENADRYL PO), Take 25 mg by mouth daily as needed , Disp: , Rfl:     busPIRone (BUSPAR) 5 MG tablet, Take 5 mg by mouth 2 times daily as needed, Disp: , Rfl:     clotrimazole (LOTRIMIN) 1 % cream, Apply topically as needed Apply topically 2 times daily. , Disp: , Rfl:     diclofenac sodium 1 % GEL, Apply 2 g topically 4 times daily as needed for Pain, Disp: , Rfl:     famotidine (PEPCID) 20 MG tablet, Take 20 mg by mouth 2 times daily, Disp: , Rfl:     mupirocin (BACTROBAN) 2 % ointment, Apply topically as needed Apply topically 3 times daily. , Disp: , Rfl:     hydrochlorothiazide (MICROZIDE) 12.5 MG capsule, Take 25 mg by mouth daily , Disp: , Rfl:     lisinopril (PRINIVIL;ZESTRIL) 20 MG tablet, Take 10 mg by mouth daily , Disp: , Rfl:     nystatin (MYCOSTATIN) 166909 UNIT/GM cream, Apply topically as needed Apply topically 2 times daily. , Disp: , Rfl:     ALPRAZolam (XANAX) 1 MG tablet, Take 1 mg by mouth nightly as needed for Sleep, Disp: , Rfl:     HYDROcodone-acetaminophen (NORCO)  MG per tablet, Take 1 tablet by mouth every 6 hours as needed for Pain for up to 30 days. May fill 7/6/20 (Patient taking differently: Take 1 tablet by mouth every 6 hours as needed for Pain ([facility] pain [facility]mt[facility] ). May fill 7/6/20), Disp: 120 tablet, Rfl: 0  Daypro [oxaprozin]; Doxepin; Gabapentin; Penicillins; Elavil [amitriptyline hcl]; Erythromycin; Meloxicam; Percocet [oxycodone-acetaminophen];  Rue Farhana [carisoprodol]; Citalopram; Demerol hcl [meperidine]; and Statins    Social History  Social History     Tobacco Use   Smoking Status Current Every Day Smoker    Packs/day: 1.00    Types: Cigarettes    Start date: 10/19/1988   Smokeless Tobacco Never Used     Social History     Substance and Sexual Activity   Alcohol Use Not Currently    Comment: rare         Family History   Problem Relation Age of Onset   Vargas Mcmahon Parkinsonism Mother    Vargas Mcmahon Alzheimer's Disease Mother     Stroke Father     Substance Abuse Brother        Review of Systems:  Constitutional: Negative. HENT: Negative. Eyes: Negative. Respiratory: Negative. Cardiovascular: Negative. Gastrointestinal: Negative. Genitourinary: Negative. Musculoskeletal: Negative. Skin: Negative. Neurological: Negative. Endo/Heme/Allergies: Negative. Psychiatric/Behavioral: Negative. PHYSICAL EXAM:  Vitals:    08/20/20 1353   BP: 117/69   Pulse: 81   SpO2: 94%     Constitutional: The patient appears well-developed andwell-nourished. Eyes - conjunctiva normal.  Conjugate gaze  Ear, nose, throat -No scars, masses, or lesions over external nose or ears, no atrophy of tongue  Neck-symmetric, no masses noted, no jugular vein distension  Respiration- chest wall appears symmetric, goodexpansion, normal effort without use of accessory muscles  Musculoskeletal - no significant wasting of muscles noted, no bony deformities, gait no gross ataxia  Extremities-no clubbing, cyanosis or edema  Skin- warm, dry, and intact. No rash, erythema, or pallor.   Psychiatric - mood, affect, and behavior appear normal.     Neurologic Examination  Awake, Alert andoriented x 3  Normal speech pattern, following commands  Motor 5/5 all extremities  No deficits to light touch     Walking with a pope       Wound:  C/D/I, steri strips still intact, no signs or symptoms of infection noted     DATA and IMAGING:    Lab Results   Component Value Date    WBC 13.7 (H)

## 2020-08-28 ENCOUNTER — TELEPHONE (OUTPATIENT)
Dept: NEUROSURGERY | Age: 55
End: 2020-08-28

## 2020-08-28 RX ORDER — HYDROCODONE BITARTRATE AND ACETAMINOPHEN 7.5; 325 MG/1; MG/1
1 TABLET ORAL 3 TIMES DAILY PRN
Qty: 90 TABLET | Refills: 0 | Status: SHIPPED | OUTPATIENT
Start: 2020-08-28 | End: 2020-10-02 | Stop reason: SDUPTHER

## 2020-08-28 NOTE — TELEPHONE ENCOUNTER
Patient called and voiced that she was told to call the office when she had only about 4-5 days left on her Rx Hydrocodone  mg. She voiced that she was told to wean off the medication and call so she could get another Rx for a lower dose to continue to wean. I voiced to the patient that I would get this message to the provider and someone would give her a call back. Please advise, thank you.

## 2020-09-23 ENCOUNTER — HOSPITAL ENCOUNTER (OUTPATIENT)
Dept: GENERAL RADIOLOGY | Age: 55
Discharge: HOME | End: 2020-09-23
Payer: COMMERCIAL

## 2020-09-23 ENCOUNTER — OFFICE VISIT (OUTPATIENT)
Dept: NEUROSURGERY | Age: 55
End: 2020-09-23

## 2020-09-23 VITALS
DIASTOLIC BLOOD PRESSURE: 77 MMHG | OXYGEN SATURATION: 95 % | WEIGHT: 240 LBS | BODY MASS INDEX: 39.99 KG/M2 | HEIGHT: 65 IN | HEART RATE: 68 BPM | SYSTOLIC BLOOD PRESSURE: 118 MMHG

## 2020-09-23 PROCEDURE — 99024 POSTOP FOLLOW-UP VISIT: CPT | Performed by: NURSE PRACTITIONER

## 2020-09-23 PROCEDURE — 72100 X-RAY EXAM L-S SPINE 2/3 VWS: CPT

## 2020-09-23 RX ORDER — PREGABALIN 50 MG/1
50 CAPSULE ORAL 2 TIMES DAILY
Qty: 60 CAPSULE | Refills: 0 | Status: SHIPPED | OUTPATIENT
Start: 2020-10-10 | End: 2020-10-26 | Stop reason: SDUPTHER

## 2020-09-23 ASSESSMENT — ENCOUNTER SYMPTOMS
BACK PAIN: 1
RESPIRATORY NEGATIVE: 1
GASTROINTESTINAL NEGATIVE: 1
EYES NEGATIVE: 1

## 2020-09-23 NOTE — PROGRESS NOTES
18 [facility] Office Visit      Chief Complaint   Patient presents with    Follow-up    Lower Back Pain       HISTORY OF PRESENT ILLNESS:      Igor Galvez is a 62 y.o. female who underwent a TLIF of L3-4, L4-5 for spondylolisthesis of L3-4 on 8/05/2020 and now she is over 1 month out from her surgery. Prior to surgery she complained of low back pain and bilateral lower extremity pain that radiated into the buttocks and posterior thighs. She reported 60% was back pain and 40% was BLE pain. Today she states that she is doing well. She no longer has the severe leg pain, however, it is still sore on the left posterior aspect of the leg. She does still have a mild ache in her low back, however, this is dramatically improved. She is very pleased with surgery thus far. Past Medical History:   Diagnosis Date    Anxiety     Asthma     Back pain     Colon polyps     DDD (degenerative disc disease), cervical     Depression     Essential hypertension 10/19/2016    GERD (gastroesophageal reflux disease)     Hiatal hernia     HTN (hypertension)     Post-menopausal     Shoulder pain     Tobacco abuse 10/19/2016       Past Surgical History:   Procedure Laterality Date    GALLBLADDER SURGERY      HERNIA REPAIR      LAP BAND  08/28/2009    LUMBAR FUSION  2020    LUMBAR SPINE SURGERY N/A 8/5/2020    TLIF L3-4, L4-5 performed by Patricio Dimas DO at [address] [facility] OR        Medications    Current Outpatient Medications:     HYDROcodone-acetaminophen (NORCO) 7.5-325 MG per tablet, Take 1 tablet by mouth 3 times daily as needed for Pain for up to 30 days. , Disp: 90 tablet, Rfl: 0    pregabalin (LYRICA) 25 MG capsule, Take 25 mg by mouth 3 times daily. , Disp: , Rfl:     ondansetron (ZOFRAN) 4 MG tablet, Take 1 tablet by mouth daily as needed for Nausea or Vomiting, Disp: 30 tablet, Rfl: 0    methocarbamol (ROBAXIN-750) 750 MG tablet, Take 1 tablet by mouth 4 times daily as needed Tobacco Never Used     Social History     Substance and Sexual Activity   Alcohol Use Not Currently    Comment: rare         Family History   Problem Relation Age of Onset   [place] Parkinsonism Mother    [place] Alzheimer's Disease Mother     Stroke Father     Substance Abuse Brother        Review of Systems:  Constitutional: Negative. HENT: Negative. Eyes: Negative. Respiratory: Negative. Cardiovascular: Negative. Gastrointestinal: Negative. Genitourinary: Negative. Musculoskeletal: Negative. Skin: Negative. Neurological: Negative. Endo/Heme/Allergies: Negative. Psychiatric/Behavioral: Negative. PHYSICAL EXAM:  Vitals:    09/23/20 1403   BP: 118/77   Pulse: 68   SpO2: 95%     Constitutional: The patient appears well-developed andwell-nourished. Eyes - conjunctiva normal.  Conjugate gaze  Ear, nose, throat -No scars, masses, or lesions over external nose or ears, no atrophy of tongue  Neck-symmetric, no masses noted, no jugular vein distension  Respiration- chest wall appears symmetric, goodexpansion, normal effort without use of accessory muscles  Musculoskeletal - no significant wasting of muscles noted, no bony deformities, gait no gross ataxia  Extremities-no clubbing, cyanosis or edema  Skin- warm, dry, and intact. No rash, erythema, or pallor. Psychiatric - mood, affect, and behavior appear normal.     Neurologic Examination  Awake, Alert andoriented x 3  Normal speech pattern, following commands  Motor 5/5 all extremities  No deficits to light touch     No longer using an assistive device. Wound:   Well healed       DATA and IMAGING:    Lab Results   Component Value Date    WBC 13.7 (H) 08/07/2020    HGB 11.7 (L) 08/07/2020    HCT 36.9 (L) 08/07/2020    .5 (H) 08/07/2020     08/07/2020     Lab Results   Component Value Date     08/07/2020    K 4.9 08/07/2020    CL 98 08/07/2020    CO2 29 08/07/2020    BUN 13 08/07/2020    CREATININE 0.6 08/07/2020 GLUCOSE 109 08/07/2020    CALCIUM 10.3 (H) 08/07/2020    PROT 6.7 08/07/2020    LABALBU 4.0 08/07/2020    BILITOT 0.4 08/07/2020    ALKPHOS 103 08/07/2020    AST 43 (H) 08/07/2020    ALT 32 08/07/2020    LABGLOM >60 08/07/2020    GFRAA >59 08/07/2020     Lab Results   Component Value Date    INR 0.88 07/31/2020    PROTIME 11.8 (L) 07/31/2020    No results found. Narrative    XR LUMBAR SPINE (2-3 VIEWS)    9/23/2020 1:41 PM    History: Lumbar fusion follow-up. Two-view lumbar spine series. Comparison is made with intraoperative spot images from 8/5/2020 and    lumbar spine radiographs from 10/3/2019. L3-4 and L4-5 discectomy with bilateral pedicle screw hardware fusion. 12 degrees left convex scoliosis. Symmetric SI joints. Normal lordosis. Stable hardware.         Impression    1. Stable postoperative appearance. Signed by Dr Elma Chao on 9/23/2020 1:42 PM    I have personally reviewed the images and my interpretation is:  Hardware in good position      ASSESSMENT:   Cristian Jaquez is a 62 y.o. female who underwent a TLIF of L3-4, L4-5 for spondylolisthesis of L3-4 on 8/05/2020 and now she is over 1 month out from her surgery. PLAN:  -Follow up in 2 months w/XR lumbar           ICD-10-CM    1. S/P lumbar fusion  Z98.1 XR LUMBAR SPINE (2-3 VIEWS)   2.  Chronic midline low back pain without sciatica  M54.5     G89.29         DAYSI Blanchard

## 2020-10-02 ENCOUNTER — TELEPHONE (OUTPATIENT)
Dept: NEUROSURGERY | Age: 55
End: 2020-10-02

## 2020-10-02 RX ORDER — HYDROCODONE BITARTRATE AND ACETAMINOPHEN 7.5; 325 MG/1; MG/1
1 TABLET ORAL 2 TIMES DAILY PRN
Qty: 60 TABLET | Refills: 0 | Status: SHIPPED | OUTPATIENT
Start: 2020-10-02 | End: 2020-11-10 | Stop reason: SDUPTHER

## 2020-10-02 NOTE — TELEPHONE ENCOUNTER
Patient called and voiced that she was told per Barbette Epley to call the office when she got low on her pain medications so that Navi Dossley could continue to wean her from them. Patient states that she is doing just fine on the 7.5 mg tablets BID, and she has about 4-5 days worth left. Patient states that she would like to continue weaning off the pain medication so that she can eventually get back to taking Aleve or Tylenol for her pain. Patient would like a call back once this has been completed.

## 2020-10-20 ENCOUNTER — TELEPHONE (OUTPATIENT)
Dept: PAIN MANAGEMENT | Age: 55
End: 2020-10-20

## 2020-10-20 NOTE — TELEPHONE ENCOUNTER
Patient called to request to receive trigger point injections in her hip. She is to see Mary Roland in the office on Monday 10/26 and she would like to have something done then if possible. I am messaging Mary Roland to make her aware, however this is a post surgical follow up for the patient. Trigger point injections may have to be scheduled for the future following that appointment.

## 2020-10-26 RX ORDER — PREGABALIN 50 MG/1
50 CAPSULE ORAL 2 TIMES DAILY
Qty: 60 CAPSULE | Refills: 0 | Status: SHIPPED | OUTPATIENT
Start: 2020-11-10 | End: 2020-12-14 | Stop reason: SDUPTHER

## 2020-10-26 RX ORDER — METHOCARBAMOL 750 MG/1
750 TABLET, FILM COATED ORAL 4 TIMES DAILY PRN
Qty: 120 TABLET | Refills: 2 | Status: SHIPPED | OUTPATIENT
Start: 2020-10-26 | End: 2021-01-27 | Stop reason: SDUPTHER

## 2020-11-10 RX ORDER — HYDROCODONE BITARTRATE AND ACETAMINOPHEN 7.5; 325 MG/1; MG/1
1 TABLET ORAL EVERY 8 HOURS PRN
Qty: 60 TABLET | Refills: 0 | Status: SHIPPED | OUTPATIENT
Start: 2020-11-10 | End: 2021-01-04

## 2020-11-10 RX ORDER — HYDROCODONE BITARTRATE AND ACETAMINOPHEN 7.5; 325 MG/1; MG/1
1 TABLET ORAL EVERY 8 HOURS PRN
Qty: 60 TABLET | Refills: 0 | Status: SHIPPED | OUTPATIENT
Start: 2020-12-10 | End: 2021-01-26

## 2020-11-19 ENCOUNTER — OFFICE VISIT (OUTPATIENT)
Dept: NEUROSURGERY | Age: 55
End: 2020-11-19
Payer: COMMERCIAL

## 2020-11-19 ENCOUNTER — HOSPITAL ENCOUNTER (OUTPATIENT)
Dept: GENERAL RADIOLOGY | Age: 55
Discharge: HOME | End: 2020-11-19
Payer: COMMERCIAL

## 2020-11-19 VITALS
HEART RATE: 74 BPM | HEIGHT: 65 IN | SYSTOLIC BLOOD PRESSURE: 120 MMHG | DIASTOLIC BLOOD PRESSURE: 75 MMHG | OXYGEN SATURATION: 95 % | BODY MASS INDEX: 37.65 KG/M2 | WEIGHT: 226 LBS

## 2020-11-19 PROCEDURE — 99213 OFFICE O/P EST LOW 20 MIN: CPT | Performed by: NEUROLOGICAL SURGERY

## 2020-11-19 PROCEDURE — 72100 X-RAY EXAM L-S SPINE 2/3 VWS: CPT

## 2020-11-19 ASSESSMENT — ENCOUNTER SYMPTOMS
EYES NEGATIVE: 1
GASTROINTESTINAL NEGATIVE: 1
RESPIRATORY NEGATIVE: 1

## 2020-11-19 NOTE — PROGRESS NOTES
18 [facility] Office Visit      Chief Complaint   Patient presents with    Follow-up       HISTORY OF PRESENT ILLNESS:      Juan Godwin is a 62 y.o. female who underwent a TLIF of L3-4, L4-5 for spondylolisthesis of L3-4 on 8/05/2020 and now she is over 3 months out from her surgery. Prior to surgery she complained of low back pain and bilateral lower extremity pain that radiated into the buttocks and posterior thighs. She reported 60% was back pain and 40% was BLE pain. Today she states that her back \"is great\". She admits to a very mild ache if she over does it maybe cleaning the house too long. She does complain of some left knee popping when walking on concrete but no pain. Past Medical History:   Diagnosis Date    Anxiety     Asthma     Back pain     Colon polyps     DDD (degenerative disc disease), cervical     Depression     Essential hypertension 10/19/2016    GERD (gastroesophageal reflux disease)     Hiatal hernia     HTN (hypertension)     Post-menopausal     Shoulder pain     Tobacco abuse 10/19/2016       Past Surgical History:   Procedure Laterality Date    GALLBLADDER SURGERY      HERNIA REPAIR      LAP BAND  08/28/2009    LUMBAR FUSION  2020    LUMBAR SPINE SURGERY N/A 8/5/2020    TLIF L3-4, L4-5 performed by Yehuda Junior DO at [facility] OR        Medications    Current Outpatient Medications:     HYDROcodone-acetaminophen (NORCO) 7.5-325 MG per tablet, Take 1 tablet by mouth every 8 hours as needed for Pain for up to 30 days. , Disp: 60 tablet, Rfl: 0    [START ON 12/10/2020] HYDROcodone-acetaminophen (NORCO) 7.5-325 MG per tablet, Take 1 tablet by mouth every 8 hours as needed for Pain for up to 30 days. , Disp: 60 tablet, Rfl: 0    methocarbamol (ROBAXIN-750) 750 MG tablet, Take 1 tablet by mouth 4 times daily as needed (muscle spasm), Disp: 120 tablet, Rfl: 2    pregabalin (LYRICA) 50 MG capsule, Take 1 capsule by mouth 2 times daily for 30 days. May fill 11/10/20, Disp: 60 capsule, Rfl: 0    pregabalin (LYRICA) 25 MG capsule, Take 25 mg by mouth 3 times daily. , Disp: , Rfl:     ondansetron (ZOFRAN) 4 MG tablet, Take 1 tablet by mouth daily as needed for Nausea or Vomiting, Disp: 30 tablet, Rfl: 0    acetaminophen (TYLENOL) 325 MG tablet, Take 325 mg by mouth every 6 hours as needed for Pain , Disp: , Rfl:     aspirin 325 MG tablet, Take 325 mg by mouth daily, Disp: , Rfl:     diphenhydrAMINE HCl (BENADRYL PO), Take 25 mg by mouth daily as needed , Disp: , Rfl:     busPIRone (BUSPAR) 5 MG tablet, Take 5 mg by mouth 2 times daily as needed, Disp: , Rfl:     clotrimazole (LOTRIMIN) 1 % cream, Apply topically as needed Apply topically 2 times daily. , Disp: , Rfl:     diclofenac sodium 1 % GEL, Apply 2 g topically 4 times daily as needed for Pain, Disp: , Rfl:     famotidine (PEPCID) 20 MG tablet, Take 20 mg by mouth 2 times daily, Disp: , Rfl:     mupirocin (BACTROBAN) 2 % ointment, Apply topically as needed Apply topically 3 times daily. , Disp: , Rfl:     hydrochlorothiazide (MICROZIDE) 12.5 MG capsule, Take 25 mg by mouth daily , Disp: , Rfl:     lisinopril (PRINIVIL;ZESTRIL) 20 MG tablet, Take 10 mg by mouth daily , Disp: , Rfl:     nystatin (MYCOSTATIN) 369379 UNIT/GM cream, Apply topically as needed Apply topically 2 times daily. , Disp: , Rfl:     ALPRAZolam (XANAX) 1 MG tablet, Take 1 mg by mouth nightly as needed for Sleep, Disp: , Rfl:   Daypro [oxaprozin]; Doxepin; Gabapentin; Penicillins; Elavil [amitriptyline hcl]; Erythromycin; Meloxicam; Percocet [oxycodone-acetaminophen]; Soma [carisoprodol];  Citalopram; Demerol hcl [meperidine]; and Statins    Social History  Social History     Tobacco Use   Smoking Status Current Every Day Smoker    Packs/day: 1.00    Types: Cigarettes    Start date: 10/19/1988   Smokeless Tobacco Never Used     Social History     Substance and Sexual Activity   Alcohol Use Not Currently Comment: rare         Family History   Problem Relation Age of Onset   [facility] Parkinsonism Mother     Alzheimer's Disease Mother     Stroke Father     Substance Abuse Brother        Review of Systems:  Constitutional: Negative. HENT: Negative. Eyes: Negative. Respiratory: Negative. Cardiovascular: Negative. Gastrointestinal: Negative. Genitourinary: Negative. Musculoskeletal: Negative. Skin: Negative. Neurological: Negative. Endo/Heme/Allergies: Negative. Psychiatric/Behavioral: Negative. PHYSICAL EXAM:  Vitals:    11/19/20 1316   BP: 120/75   Pulse: 74   SpO2: 95%     Constitutional: The patient appears well-developed andwell-nourished. Eyes - conjunctiva normal.  Conjugate gaze  Ear, nose, throat -No scars, masses, or lesions over external nose or ears, no atrophy of tongue  Neck-symmetric, no masses noted, no jugular vein distension  Respiration- chest wall appears symmetric, goodexpansion, normal effort without use of accessory muscles  Musculoskeletal - no significant wasting of muscles noted, no bony deformities, gait no gross ataxia  Extremities-no clubbing, cyanosis or edema  Skin- warm, dry, and intact. No rash, erythema, or pallor. Psychiatric - mood, affect, and behavior appear normal.     Neurologic Examination  Awake, Alert andoriented x 3  Normal speech pattern, following commands  Motor 5/5 all extremities  No deficits to light touch     No longer using an assistive device. Wound:   Well healed       DATA and IMAGING:    Lab Results   Component Value Date    WBC 13.7 (H) 08/07/2020    HGB 11.7 (L) 08/07/2020    HCT 36.9 (L) 08/07/2020    .5 (H) 08/07/2020     08/07/2020     Lab Results   Component Value Date     08/07/2020    K 4.9 08/07/2020    CL 98 08/07/2020    CO2 29 08/07/2020    BUN 13 08/07/2020    CREATININE 0.6 08/07/2020    GLUCOSE 109 08/07/2020    CALCIUM 10.3 (H) 08/07/2020    PROT 6.7 08/07/2020    LABALBU 4.0 08/07/2020

## 2020-12-14 RX ORDER — PREGABALIN 50 MG/1
50 CAPSULE ORAL 2 TIMES DAILY
Qty: 60 CAPSULE | Refills: 0 | Status: SHIPPED | OUTPATIENT
Start: 2020-12-14 | End: 2021-01-04 | Stop reason: SDUPTHER

## 2021-01-04 ENCOUNTER — HOSPITAL ENCOUNTER (OUTPATIENT)
Dept: PAIN MANAGEMENT | Age: 56
Discharge: HOME | End: 2021-01-04
Payer: COMMERCIAL

## 2021-01-04 ENCOUNTER — HOSPITAL ENCOUNTER (OUTPATIENT)
Dept: GENERAL RADIOLOGY | Age: 56
Discharge: HOME | End: 2021-01-04
Payer: COMMERCIAL

## 2021-01-04 VITALS
HEIGHT: 65 IN | DIASTOLIC BLOOD PRESSURE: 84 MMHG | TEMPERATURE: 99.7 F | WEIGHT: 231 LBS | HEART RATE: 92 BPM | RESPIRATION RATE: 18 BRPM | OXYGEN SATURATION: 96 % | BODY MASS INDEX: 38.49 KG/M2 | SYSTOLIC BLOOD PRESSURE: 132 MMHG

## 2021-01-04 DIAGNOSIS — M25.562 CHRONIC PAIN OF LEFT KNEE: ICD-10-CM

## 2021-01-04 DIAGNOSIS — G89.29 CHRONIC BILATERAL LOW BACK PAIN WITH BILATERAL SCIATICA: ICD-10-CM

## 2021-01-04 DIAGNOSIS — M25.512 CHRONIC PAIN OF BOTH SHOULDERS: ICD-10-CM

## 2021-01-04 DIAGNOSIS — G89.29 CHRONIC PAIN OF LEFT KNEE: ICD-10-CM

## 2021-01-04 DIAGNOSIS — M54.41 CHRONIC BILATERAL LOW BACK PAIN WITH BILATERAL SCIATICA: ICD-10-CM

## 2021-01-04 DIAGNOSIS — G89.29 CHRONIC PAIN OF BOTH SHOULDERS: Primary | ICD-10-CM

## 2021-01-04 DIAGNOSIS — G89.29 CHRONIC PAIN OF BOTH SHOULDERS: ICD-10-CM

## 2021-01-04 DIAGNOSIS — M25.512 CHRONIC PAIN OF BOTH SHOULDERS: Primary | ICD-10-CM

## 2021-01-04 DIAGNOSIS — M25.511 CHRONIC PAIN OF BOTH SHOULDERS: ICD-10-CM

## 2021-01-04 DIAGNOSIS — M54.42 CHRONIC BILATERAL LOW BACK PAIN WITH BILATERAL SCIATICA: ICD-10-CM

## 2021-01-04 DIAGNOSIS — M25.511 CHRONIC PAIN OF BOTH SHOULDERS: Primary | ICD-10-CM

## 2021-01-04 PROCEDURE — 73030 X-RAY EXAM OF SHOULDER: CPT

## 2021-01-04 PROCEDURE — 73560 X-RAY EXAM OF KNEE 1 OR 2: CPT

## 2021-01-04 PROCEDURE — 99214 OFFICE O/P EST MOD 30 MIN: CPT | Performed by: NURSE PRACTITIONER

## 2021-01-04 PROCEDURE — 99214 OFFICE O/P EST MOD 30 MIN: CPT

## 2021-01-04 RX ORDER — HYDROCODONE BITARTRATE AND ACETAMINOPHEN 10; 325 MG/1; MG/1
1 TABLET ORAL EVERY 8 HOURS PRN
Qty: 75 TABLET | Refills: 0 | Status: SHIPPED | OUTPATIENT
Start: 2021-01-04 | End: 2021-01-27 | Stop reason: SDUPTHER

## 2021-01-04 RX ORDER — DICLOFENAC SODIUM 75 MG/1
75 TABLET, DELAYED RELEASE ORAL 2 TIMES DAILY
COMMUNITY
End: 2021-09-09 | Stop reason: ALTCHOICE

## 2021-01-04 ASSESSMENT — ENCOUNTER SYMPTOMS
CONSTIPATION: 0
BOWEL INCONTINENCE: 0
BACK PAIN: 1

## 2021-01-04 ASSESSMENT — PAIN DESCRIPTION - ORIENTATION: ORIENTATION: LEFT;RIGHT

## 2021-01-04 ASSESSMENT — PAIN DESCRIPTION - PAIN TYPE: TYPE: CHRONIC PAIN

## 2021-01-04 NOTE — PROGRESS NOTES
Clinic Documentation      Education Provided:  [x] Review of Syed Estrada  [] Agreement Review  [x] PEG Score Calculated [] PHQ Score Calculated [] ORT Score Calculated    [] Compliance Issues Discussed [] Cognitive Behavior Needs [x] Exercise [] Review of Test [] Financial Issues  [x] Tobacco/Alcohol Use Reviewed [x] Teaching [] New Patient [] Picture Obtained    Physician Plan:  [] Outgoing Referral  [] Pharmacy Consult  [] Test Ordered [x] Prescription Ordered/Changed   [] Obtained Test Results / Consult Notes        Complete if patient is withholding blood thinner for procedure     Blood Thinner Patient is currently taking:      [] Plavix (Hold for 7 days)  [] Aspirin (Hold for 5 days)     [] Pletal (Hold for 2 days)  [] Pradaxa (Hold for 3 days)    [] Effient (Hold for 7 days)  [] Xarelto (Hold for 2 days)    [] Eliquis (Hold for 2 days)  [] Brilinta (Hold for 7 days)    [] Coumadin (Hold for 5 days) - (INR needs to be drawn the day prior to procedure- INR < 2.0)    [] Aggrenox (Hold for 7 days)        [] Patient will stop medication on their own.    [] Blood Thinner Form Faxed for approval to hold.    Provider form faxed to:     Assessment Completed by:  Electronically signed by Watson Blizzard, RN on 1/4/2021 at 1:52 PM

## 2021-01-04 NOTE — PROGRESS NOTES
[facility]  Office Note    Patient Name: Liset Mendoza    MR #: [id number]    Account #: [de-identified]    : 1965    Age: 60 y.o. Sex: female    Date: 2021    PCP: Saima Sanchez    Chief Complaint:   Chief Complaint   Patient presents with    Knee Pain     Left    Foot Pain     Left    Shoulder Pain     Bilateral       History of Present Illness:     Liset Mendoza is a 60 y.o. female who presents for 3 month follow up. Patient is being seen along with her spouse. Patient had TLIF of L3-4, L4-5 for spondylolisthesis of L3-4 on 2020. Patient states that her low back is doing well but she is having worsening pain in left knee. She stats that the pain worsens with flexion of left knee. Patient states that she has trouble sleeping due to pain. Patient states that house work causes the pain to worsen and she has trouble the following day. Patient states that she is having pain with bilateral shoulders. Patient states that when she sews or crochets that she has increase her pain. Patient states daily house hold chores and ADLs. Back Pain  This is a chronic problem. The current episode started more than 1 year ago. The problem occurs constantly. The problem has been waxing and waning since onset. The pain is present in the gluteal, lumbar spine and sacro-iliac. The quality of the pain is described as aching, cramping, shooting and stabbing. Radiates to: BLE. The symptoms are aggravated by position, sitting, standing and twisting (Walking). Associated symptoms include leg pain (improved since surgery) and tingling (improved since surgery). Pertinent negatives include no bladder incontinence or bowel incontinence. Risk factors include obesity. Knee Pain  This is a chronic problem. The current episode started more than 1 year ago. The problem occurs constantly. The problem has been gradually worsening. Associated symptoms include arthralgias and myalgias.  The symptoms are aggravated by bending and walking. Shoulder Pain  This is a chronic problem. The current episode started more than 1 year ago. The problem occurs constantly. The problem has been rapidly improving. Associated symptoms include arthralgias and myalgias. The symptoms are aggravated by bending (lifting, reaching, carrying, house hold chorse). Screening Tools:    PEG Score: 7.7     Last PEG Score: 9     Annual ORT Score: 8     Annual PHQ Score: 6    Current Pain Assessment  Pain Assessment  Pain Assessment: 0-10  Pain Level: 7  Pain Type: Chronic pain  Pain Location: Knee, Foot, Shoulder  Pain Orientation: Left, Right    Past Visit HPI:  7/23/2020  presented for sooner appointment due to worsening low back pain. Patient is scheduled for TLIF L3/L4 and L4/L5 on 8/5/2020. Patient has under LESI with some relief in symptoms but pain has gotten to where it is significantly affecting patient's ability to do ADLs and have quality of life. Patient's radicular pain is significantly worse and patient is having significant muscle spasms. Patient is very sensitive to medications and she has been hestitant to take medication for radicular pain. However, the pain has gotten severe enough she is willing to try. 5/4/2020  presents to the office for sooner office visit. Patient had right SI, right greater trochanteric bursa and right piriformis injected on 3/11/2020. Patient did have an improvement in her low back pain until 4/27/2020. Patient called the nurse line stating that she had an increase in pain and inflammation. She knew it was too soon for a repeat injection was wondering if there is anything that could be done. Patient requested a Medrol Dosepak. Patient was given Medrol Dosepak over the phone and instructed to call if it did not improve. Patient stated that it improved for a short period of time however walking was making the pain worse.   She states that she hurts across lower back and then down bilateral lower extremities straight back to the foot. Patient states that she has noticed that she trips a lot with her right foot. She states that she feels like she is leaning and that she cannot stand straight when ambulating. Patient has known instability in low back and had wanted to postpone low back surgery. However patient is stating that she cannot take the pain any longer and she is wanting something to be done to help relieve the pain. Patient does appear to be in pain. Patient did become tearful during exam.  Patient is usually upbeat and . Patient states that pain is affecting her quality of life. Back Pain is a chronic problem. The current episode started more than 1 year ago. The problem occurs constantly. The problem has been rapidly worsening since onset. The pain is present in the gluteal, lumbar spine and sacro-iliac. The quality of the pain is described as aching, cramping, stabbing and shooting. Radiates to: BLE. The symptoms are aggravated by position, sitting, standing and twisting (Walking). Associated symptoms include leg pain, numbness, tingling and weakness. Pertinent negatives include no bladder incontinence or bowel incontinence. Risk factors include obesity. 12/2019  presents to the office for procedure follow-up and office visit. Patient had right SI and right trochanteric bursa injection with ultrasound and right piriformis on 10/23/2019. Patient sustained 50% relief for approximately 1 month before diminishing of pain. Patient stated that the injection targeted the area of pain.     Patient requested sooner appointment due to increase in low back pain. Patient had called provider at home regarding acute low back pain with numbness to right lower extremity that had started suddenly on Saturday. 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   Patient stated that she had done fine on Friday and then on Saturday she just had and a sudden acute onset of low back pain that was gripping. At the time pain medication was not controlling her pain. Patient stated that she started to have numbness to her right foot that is intermittent and that when she is having the numbness that she feels as if she may fall. Patient rated her pain a 10 out of a 10 she described it is a stabbing in the low back. Patient states that sitting on the toilet will cause an increase in the radicular pain. Patient states that she has to lean on the shopping cart when she is ambulating to help with the low back pain. Patient states that cleaning such as sweeping mopping or doing anything side to side significantly increases low back pain. MRI was ordered of the lumbar spine. Patient does have instability that was on x-ray of the lumbar spine.   However at the time the x-ray was taken patient was not having any type of radicular or nerve symptoms.     10/3/19  presents with referral from [place] [id number] primary complaints of chronic low back pain.  Pain has been a gradual onset that started around 2006. Waymond Barrier is aggravated by lifting bending and walking.  Patient had imaging done by Dr. Jonathon Nichols in [facility] which indicated she had 2 bulging disks.  Per patient she was told that she had 2 dead disks and 2 bulging disks in the lumbar region. Danyel Nicely was told that the hip pain was deferred from the bulging disks and this is progressively worsened over the past 12 years. July Green states the pain is now constant and causes a limp.  Patient has been tried on several nerve medications doxepin caused patient to sleepwalk, gabapentin caused patient to lose time, and amitriptyline made her feel as if her blood was boiling.  Patient is tried massage therapy but this causes her to become more tense.  Aggravating factors include ambulation prolonged sitting prolonged standing.  Patient states that she does not hurt in the morning but it progresses as the day goes on.     Other areas of pain that will be addressed at a later time is bilateral shoulder pain, hip pain and left knee pain.     Of note: Patient has numerous medical allergies penicillin causes hives, erythromycin causes hives, doxepin causes sleepwalking, gabapentin causes loss of memory, amitriptyline makes her feel like her blood boiling, Daypro eyes swelled shut, Soma horrible headaches, Percocet nightmares, Demerol nausea, statins muscle spasms, meloxicam irritates stomach ulcers, citalopram causes memory loss, menthol and camphor causes hives    Employment: retired     Past Medical History  Past Medical History:   Diagnosis Date    Anxiety     Asthma     Back pain     Colon polyps     DDD (degenerative disc disease), cervical     Depression     Essential hypertension 10/19/2016    GERD (gastroesophageal reflux disease)     Hiatal hernia     HTN (hypertension)     Post-menopausal     Shoulder pain     Tobacco abuse 10/19/2016       Surgery History  Past Surgical History:   Procedure Laterality Date    GALLBLADDER SURGERY      HERNIA REPAIR      LAP BAND  08/28/2009    LUMBAR FUSION  2020    LUMBAR SPINE SURGERY N/A 8/5/2020    TLIF L3-4, L4-5 performed by Liza Joya DO at [address] History  family history includes Alzheimer's Disease in her mother; Parkinsonism in her mother; Stroke in her father; Substance Abuse in her brother.     Social History    Social History     Tobacco Use    Smoking status: Current Every Day Smoker     Packs/day: 1.00     Types: Cigarettes     Start date: 10/19/1988    Smokeless tobacco: Never Used   Substance Use Topics    Alcohol use: Not Currently     Comment: rare       Allergies  Daypro [oxaprozin], Doxepin, Gabapentin, Penicillins, Elavil [amitriptyline hcl], Erythromycin, Meloxicam, Percocet [oxycodone-acetaminophen], Soma [carisoprodol], Citalopram, Demerol hcl [meperidine], and Statins     Current Medications Current Outpatient Medications   Medication Sig Dispense Refill    diclofenac (VOLTAREN) 75 MG EC tablet Take 75 mg by mouth 2 times daily      pregabalin (LYRICA) 50 MG capsule Take 1 capsule by mouth 2 times daily for 30 days. 60 capsule 0    HYDROcodone-acetaminophen (NORCO) 7.5-325 MG per tablet Take 1 tablet by mouth every 8 hours as needed for Pain for up to 30 days. 60 tablet 0    methocarbamol (ROBAXIN-750) 750 MG tablet Take 1 tablet by mouth 4 times daily as needed (muscle spasm) 120 tablet 2    acetaminophen (TYLENOL) 325 MG tablet Take 325 mg by mouth every 6 hours as needed for Pain       aspirin 325 MG tablet Take 325 mg by mouth daily      diphenhydrAMINE HCl (BENADRYL PO) Take 25 mg by mouth daily as needed       busPIRone (BUSPAR) 5 MG tablet Take 5 mg by mouth 2 times daily as needed      clotrimazole (LOTRIMIN) 1 % cream Apply topically as needed Apply topically 2 times daily.  famotidine (PEPCID) 20 MG tablet Take 20 mg by mouth 2 times daily      mupirocin (BACTROBAN) 2 % ointment Apply topically as needed Apply topically 3 times daily.  hydrochlorothiazide (MICROZIDE) 12.5 MG capsule Take 25 mg by mouth daily       lisinopril (PRINIVIL;ZESTRIL) 20 MG tablet Take 10 mg by mouth daily       nystatin (MYCOSTATIN) 534095 UNIT/GM cream Apply topically as needed Apply topically 2 times daily.  ALPRAZolam (XANAX) 1 MG tablet Take 1 mg by mouth nightly as needed for Sleep       No current facility-administered medications for this encounter. Review of Systems:  Review of Systems   Constitutional: Positive for activity change. Gastrointestinal: Negative for bowel incontinence and constipation. Genitourinary: Negative for bladder incontinence. Musculoskeletal: Positive for arthralgias, back pain, gait problem and myalgias. Neurological: Positive for tingling (improved since surgery). Psychiatric/Behavioral: Positive for sleep disturbance.  Negative for agitation, self-injury and suicidal ideas. The patient is not nervous/anxious. 14 point ROS negative besides that noted in HPI    Physical Exam:    Vitals:    01/04/21 1405   BP: 132/84   Pulse: 92   Resp: 18   Temp: 99.7 °F (37.6 °C)   TempSrc: Temporal   SpO2: 96%   Weight: 231 lb (104.8 kg)   Height: 5' 5\" (1.651 m)       Body mass index is 38.44 kg/m². Physical Exam  Vitals signs and nursing note reviewed. Constitutional:       General: She is not in acute distress. Appearance: She is well-developed. HENT:      Head: Normocephalic. Right Ear: External ear normal.      Left Ear: External ear normal.      Nose: Nose normal.   Eyes:      Conjunctiva/sclera: Conjunctivae normal.      Pupils: Pupils are equal, round, and reactive to light. Neck:      Vascular: No JVD. Trachea: No tracheal deviation. Cardiovascular:      Rate and Rhythm: Normal rate. Pulmonary:      Effort: Pulmonary effort is normal.   Abdominal:      General: There is no distension. Tenderness: There is no abdominal tenderness. Musculoskeletal:      Right shoulder: She exhibits decreased range of motion, tenderness, bony tenderness and pain. Left shoulder: She exhibits decreased range of motion, tenderness, bony tenderness and pain. Left knee: She exhibits decreased range of motion and bony tenderness. Tenderness found. Patellar tendon tenderness noted. Lumbar back: She exhibits decreased range of motion, tenderness, pain and spasm. Comments: + empty can, unable to reach behind back, grimaces with pain during assessment and rom   Skin:     General: Skin is warm and dry. Neurological:      Mental Status: She is alert and oriented to person, place, and time. Cranial Nerves: No cranial nerve deficit. Psychiatric:         Behavior: Behavior normal.         Thought Content:  Thought content normal.         Judgment: Judgment normal.       Labs:  Lab Results   Component Value Date    NA 140 08/07/2020    K 4.9 08/07/2020    K 4.8 08/06/2020    CL 98 08/07/2020    CO2 29 08/07/2020    BUN 13 08/07/2020    CREATININE 0.6 08/07/2020    GLUCOSE 109 08/07/2020    CALCIUM 10.3 08/07/2020        Lab Results   Component Value Date    WBC 13.7 (H) 08/07/2020    HGB 11.7 (L) 08/07/2020    HCT 36.9 (L) 08/07/2020    .5 (H) 08/07/2020     08/07/2020       Assessment:  Active Problems:    Chronic bilateral low back pain with bilateral sciatica    SI (sacroiliac) joint dysfunction    Piriformis syndrome of right side    Chronic pain of both shoulders    Trochanteric bursitis of right hip    Anterolisthesis    Spondylolisthesis at L3-L4 level    Pain medication agreement  Resolved Problems:    * No resolved hospital problems. *      Plan:    1. Chronic pain of left knee  - XR KNEE LEFT (3 VIEWS); Future    2. Chronic pain of both shoulders  - XR SHOULDER RIGHT (MIN 2 VIEWS); Future  - XR SHOULDER LEFT (MIN 2 VIEWS); Future    3. Chronic bilateral low back pain with bilateral sciatica  - HYDROcodone-acetaminophen (NORCO)  MG per tablet; Take 1 tablet by mouth every 8 hours as needed for Pain for up to 30 days. Intended supply: 30 days  Dispense: 75 tablet; Refill: 0    Patient has tried to wean herself off her pain medication after her lower back surgery however she does have multiple sources of chronic pain. Patient is very sensitive to medications and has atypical side effects. Continue Robaxin 750 mg 4 times a day as needed for myofascial pain no refill needed at this time    Continue Lyrica 50 mg twice daily with no refill needed at this time. Will obtain imaging of bilateral shoulders and left knee. Anticipate will need MRI of right shoulder due to amount of pain with range of motion and deficits in range of motion. Depending on imaging of left knee and left shoulder anticipate possible injections to help with the relief of pain.     [x] Follow up    [] 4 weeks   [x] 6-8 weeks   [] 10-12 weeks   [] 3 months  [] Post procedure to evaluate effectiveness of treatment  [] To evaluate medications changes made at office visit. [] To review diagnostics ordered at last visit. [] To evaluate response to therapy    [x] For management of controlled substance  [x] Random UDS as indicated by ORT score or if indicated by abberent behaviors    Discussion: Discussed exam findings and plan of care with patient. Patient agreed with POC and questions were asked and answered. Activity: Discussed exercise as beneficial to pain reduction, encouraged daily stretching with a focus on torso strengthening. Goal:  Pain Management Goals of Therapy:   [] Resolution in pain  [x] Decrease in pain level  [x] Improvement in ADL's  [x] Increase in activities with less pain  [] Decrease in medication      Controlled substance monitoring:    [x] Discussed medication side effects, risk of tolerance and/or dependence, and/or alternative treatment  [] Discussed the detrimental effects of long term narcotic use in younger patients especially women of childbearing years.   [x] No signs and symptoms of potential drug abuse or diversion were identified  [] Signs of potential drug abuse or diversion were identified   [] ORT Score   [] UDS non-compliant   [] See Note  [] Random urine drug screen sent today  [x] Random urine drug screen not completed today   [] Deferred New Patient  [x] Compliant  Takes CBD oil  [] Not Compliant see note  [] Medication agreement with provider signed today  [x] Medication agreement with provider on file under media   [] Medication regimen effective and continued   [] New patient continuing current medication while developing POC   [x] On going assessment and evaluation of medication regimen  [] Medication regimen not effective see plan for changes  [x] Omega Camacho reviewed & on file under media       CC:  DAYSI Garcias - CNP, 1/4/2021 at 2:30 PM    EMR dragon/transcription disclaimer: Much of this encounter note is electronic transcription/translation of spoken language to printed tach. Electronic translation of spoken language may be erroneous, or at times, nonsensical words or phrases may be inadvertently transcribed.  Although, I have reviewed the note for such errors, some may still exist.

## 2021-01-08 ENCOUNTER — TELEPHONE (OUTPATIENT)
Dept: PAIN MANAGEMENT | Age: 56
End: 2021-01-08

## 2021-01-08 NOTE — TELEPHONE ENCOUNTER
Spoke with patient regarding recent knee and shoulder xrays. John Dalal has ordered follow up MRI for shoulder. Patient is aware of this and spoke with scheduling yesterday. She is scheduled for MRI on 2/2/21 which is prior to her office visit. I let her know that John Dalal can do injections to both areas if she is agreeable and would like that done. She plans to discuss at the office visit.

## 2021-01-26 ENCOUNTER — HOSPITAL ENCOUNTER (INPATIENT)
Age: 56
LOS: 1 days | Discharge: HOME HEALTH | DRG: 563 | End: 2021-01-27
Attending: EMERGENCY MEDICINE | Admitting: STUDENT IN AN ORGANIZED HEALTH CARE EDUCATION/TRAINING PROGRAM
Payer: COMMERCIAL

## 2021-01-26 ENCOUNTER — APPOINTMENT (OUTPATIENT)
Dept: GENERAL RADIOLOGY | Age: 56
DRG: 563 | End: 2021-01-26
Payer: COMMERCIAL

## 2021-01-26 ENCOUNTER — APPOINTMENT (OUTPATIENT)
Dept: CT IMAGING | Age: 56
DRG: 563 | End: 2021-01-26
Payer: COMMERCIAL

## 2021-01-26 DIAGNOSIS — S82.141A CLOSED FRACTURE OF RIGHT TIBIAL PLATEAU, INITIAL ENCOUNTER: Primary | ICD-10-CM

## 2021-01-26 PROBLEM — S82.209A TIBIAL FRACTURE: Status: ACTIVE | Noted: 2021-01-26

## 2021-01-26 PROBLEM — Z87.81 S/P TIBIAL FRACTURE: Status: ACTIVE | Noted: 2021-01-26

## 2021-01-26 LAB
ALBUMIN SERPL-MCNC: 4.3 G/DL (ref 3.5–5.2)
ALP BLD-CCNC: 106 U/L (ref 35–104)
ALT SERPL-CCNC: 9 U/L (ref 5–33)
ANION GAP SERPL CALCULATED.3IONS-SCNC: 10 MMOL/L (ref 7–19)
APTT: 22.4 SEC (ref 26–36.2)
AST SERPL-CCNC: 9 U/L (ref 5–32)
BASOPHILS ABSOLUTE: 0.1 K/UL (ref 0–0.2)
BASOPHILS RELATIVE PERCENT: 0.3 % (ref 0–1)
BILIRUB SERPL-MCNC: <0.2 MG/DL (ref 0.2–1.2)
BILIRUBIN URINE: NEGATIVE
BLOOD, URINE: NEGATIVE
BUN BLDV-MCNC: 20 MG/DL (ref 6–20)
CALCIUM SERPL-MCNC: 9.3 MG/DL (ref 8.6–10)
CHLORIDE BLD-SCNC: 100 MMOL/L (ref 98–111)
CLARITY: CLEAR
CO2: 28 MMOL/L (ref 22–29)
COLOR: YELLOW
CREAT SERPL-MCNC: 0.6 MG/DL (ref 0.5–0.9)
EOSINOPHILS ABSOLUTE: 0.1 K/UL (ref 0–0.6)
EOSINOPHILS RELATIVE PERCENT: 0.3 % (ref 0–5)
GFR AFRICAN AMERICAN: >59
GFR NON-AFRICAN AMERICAN: >60
GLUCOSE BLD-MCNC: 114 MG/DL (ref 74–109)
GLUCOSE URINE: NEGATIVE MG/DL
HCT VFR BLD CALC: 41.7 % (ref 37–47)
HEMOGLOBIN: 12.9 G/DL (ref 12–16)
IMMATURE GRANULOCYTES #: 0.1 K/UL
INR BLD: 0.96 (ref 0.88–1.18)
KETONES, URINE: NEGATIVE MG/DL
LEUKOCYTE ESTERASE, URINE: NEGATIVE
LYMPHOCYTES ABSOLUTE: 1.4 K/UL (ref 1.1–4.5)
LYMPHOCYTES RELATIVE PERCENT: 6.9 % (ref 20–40)
MCH RBC QN AUTO: 29.9 PG (ref 27–31)
MCHC RBC AUTO-ENTMCNC: 30.9 G/DL (ref 33–37)
MCV RBC AUTO: 96.8 FL (ref 81–99)
MONOCYTES ABSOLUTE: 1.2 K/UL (ref 0–0.9)
MONOCYTES RELATIVE PERCENT: 6.1 % (ref 0–10)
NEUTROPHILS ABSOLUTE: 16.8 K/UL (ref 1.5–7.5)
NEUTROPHILS RELATIVE PERCENT: 85.8 % (ref 50–65)
NITRITE, URINE: NEGATIVE
PDW BLD-RTO: 15.1 % (ref 11.5–14.5)
PH UA: 5 (ref 5–8)
PLATELET # BLD: 355 K/UL (ref 130–400)
PMV BLD AUTO: 9.4 FL (ref 9.4–12.3)
POTASSIUM SERPL-SCNC: 4.4 MMOL/L (ref 3.5–5)
PROTEIN UA: NEGATIVE MG/DL
PROTHROMBIN TIME: 12.7 SEC (ref 12–14.6)
RBC # BLD: 4.31 M/UL (ref 4.2–5.4)
SARS-COV-2, NAAT: NOT DETECTED
SODIUM BLD-SCNC: 138 MMOL/L (ref 136–145)
SPECIFIC GRAVITY UA: 1.01 (ref 1–1.03)
TOTAL PROTEIN: 7.1 G/DL (ref 6.6–8.7)
UROBILINOGEN, URINE: 0.2 E.U./DL
WBC # BLD: 19.5 K/UL (ref 4.8–10.8)

## 2021-01-26 PROCEDURE — 36415 COLL VENOUS BLD VENIPUNCTURE: CPT

## 2021-01-26 PROCEDURE — 1210000000 HC MED SURG R&B

## 2021-01-26 PROCEDURE — 93005 ELECTROCARDIOGRAM TRACING: CPT | Performed by: EMERGENCY MEDICINE

## 2021-01-26 PROCEDURE — 85025 COMPLETE CBC W/AUTO DIFF WBC: CPT

## 2021-01-26 PROCEDURE — 96375 TX/PRO/DX INJ NEW DRUG ADDON: CPT

## 2021-01-26 PROCEDURE — 85730 THROMBOPLASTIN TIME PARTIAL: CPT

## 2021-01-26 PROCEDURE — 6360000002 HC RX W HCPCS: Performed by: EMERGENCY MEDICINE

## 2021-01-26 PROCEDURE — 85610 PROTHROMBIN TIME: CPT

## 2021-01-26 PROCEDURE — 96374 THER/PROPH/DIAG INJ IV PUSH: CPT

## 2021-01-26 PROCEDURE — 80053 COMPREHEN METABOLIC PANEL: CPT

## 2021-01-26 PROCEDURE — 81003 URINALYSIS AUTO W/O SCOPE: CPT

## 2021-01-26 PROCEDURE — 6360000002 HC RX W HCPCS

## 2021-01-26 PROCEDURE — 2580000003 HC RX 258: Performed by: STUDENT IN AN ORGANIZED HEALTH CARE EDUCATION/TRAINING PROGRAM

## 2021-01-26 PROCEDURE — U0002 COVID-19 LAB TEST NON-CDC: HCPCS

## 2021-01-26 PROCEDURE — 6370000000 HC RX 637 (ALT 250 FOR IP): Performed by: STUDENT IN AN ORGANIZED HEALTH CARE EDUCATION/TRAINING PROGRAM

## 2021-01-26 PROCEDURE — 73560 X-RAY EXAM OF KNEE 1 OR 2: CPT

## 2021-01-26 PROCEDURE — 73700 CT LOWER EXTREMITY W/O DYE: CPT

## 2021-01-26 PROCEDURE — 71045 X-RAY EXAM CHEST 1 VIEW: CPT

## 2021-01-26 PROCEDURE — 99284 EMERGENCY DEPT VISIT MOD MDM: CPT

## 2021-01-26 RX ORDER — ONDANSETRON 2 MG/ML
4 INJECTION INTRAMUSCULAR; INTRAVENOUS ONCE
Status: COMPLETED | OUTPATIENT
Start: 2021-01-26 | End: 2021-01-26

## 2021-01-26 RX ORDER — POTASSIUM CHLORIDE 20 MEQ/1
40 TABLET, EXTENDED RELEASE ORAL PRN
Status: DISCONTINUED | OUTPATIENT
Start: 2021-01-26 | End: 2021-01-27 | Stop reason: HOSPADM

## 2021-01-26 RX ORDER — MORPHINE SULFATE 4 MG/ML
2 INJECTION, SOLUTION INTRAMUSCULAR; INTRAVENOUS
Status: DISCONTINUED | OUTPATIENT
Start: 2021-01-26 | End: 2021-01-27 | Stop reason: HOSPADM

## 2021-01-26 RX ORDER — ACETAMINOPHEN 325 MG/1
650 TABLET ORAL EVERY 6 HOURS PRN
Status: DISCONTINUED | OUTPATIENT
Start: 2021-01-26 | End: 2021-01-27 | Stop reason: HOSPADM

## 2021-01-26 RX ORDER — MAGNESIUM SULFATE IN WATER 40 MG/ML
2000 INJECTION, SOLUTION INTRAVENOUS PRN
Status: DISCONTINUED | OUTPATIENT
Start: 2021-01-26 | End: 2021-01-27 | Stop reason: HOSPADM

## 2021-01-26 RX ORDER — BUSPIRONE HYDROCHLORIDE 5 MG/1
5 TABLET ORAL 2 TIMES DAILY PRN
Status: DISCONTINUED | OUTPATIENT
Start: 2021-01-26 | End: 2021-01-27 | Stop reason: HOSPADM

## 2021-01-26 RX ORDER — SODIUM CHLORIDE 0.9 % (FLUSH) 0.9 %
10 SYRINGE (ML) INJECTION EVERY 12 HOURS SCHEDULED
Status: DISCONTINUED | OUTPATIENT
Start: 2021-01-26 | End: 2021-01-27 | Stop reason: HOSPADM

## 2021-01-26 RX ORDER — FAMOTIDINE 20 MG/1
20 TABLET, FILM COATED ORAL 2 TIMES DAILY
Status: DISCONTINUED | OUTPATIENT
Start: 2021-01-26 | End: 2021-01-27 | Stop reason: HOSPADM

## 2021-01-26 RX ORDER — ALPRAZOLAM 1 MG/1
1 TABLET ORAL NIGHTLY PRN
Status: DISCONTINUED | OUTPATIENT
Start: 2021-01-26 | End: 2021-01-27 | Stop reason: HOSPADM

## 2021-01-26 RX ORDER — ACETAMINOPHEN 650 MG/1
650 SUPPOSITORY RECTAL EVERY 6 HOURS PRN
Status: DISCONTINUED | OUTPATIENT
Start: 2021-01-26 | End: 2021-01-27 | Stop reason: HOSPADM

## 2021-01-26 RX ORDER — POTASSIUM CHLORIDE 7.45 MG/ML
10 INJECTION INTRAVENOUS PRN
Status: DISCONTINUED | OUTPATIENT
Start: 2021-01-26 | End: 2021-01-27 | Stop reason: HOSPADM

## 2021-01-26 RX ORDER — PROMETHAZINE HYDROCHLORIDE 25 MG/1
12.5 TABLET ORAL EVERY 6 HOURS PRN
Status: DISCONTINUED | OUTPATIENT
Start: 2021-01-26 | End: 2021-01-27 | Stop reason: HOSPADM

## 2021-01-26 RX ORDER — ONDANSETRON 2 MG/ML
4 INJECTION INTRAMUSCULAR; INTRAVENOUS EVERY 6 HOURS PRN
Status: DISCONTINUED | OUTPATIENT
Start: 2021-01-26 | End: 2021-01-27 | Stop reason: HOSPADM

## 2021-01-26 RX ORDER — SODIUM CHLORIDE 9 MG/ML
INJECTION, SOLUTION INTRAVENOUS CONTINUOUS
Status: DISCONTINUED | OUTPATIENT
Start: 2021-01-26 | End: 2021-01-27 | Stop reason: HOSPADM

## 2021-01-26 RX ORDER — MORPHINE SULFATE 4 MG/ML
INJECTION, SOLUTION INTRAMUSCULAR; INTRAVENOUS
Status: COMPLETED
Start: 2021-01-26 | End: 2021-01-26

## 2021-01-26 RX ORDER — SODIUM CHLORIDE 0.9 % (FLUSH) 0.9 %
10 SYRINGE (ML) INJECTION PRN
Status: DISCONTINUED | OUTPATIENT
Start: 2021-01-26 | End: 2021-01-27 | Stop reason: HOSPADM

## 2021-01-26 RX ORDER — HYDROCODONE BITARTRATE AND ACETAMINOPHEN 10; 325 MG/1; MG/1
1 TABLET ORAL EVERY 8 HOURS PRN
Status: DISCONTINUED | OUTPATIENT
Start: 2021-01-26 | End: 2021-01-26

## 2021-01-26 RX ORDER — PREGABALIN 50 MG/1
50 CAPSULE ORAL 2 TIMES DAILY
Status: DISCONTINUED | OUTPATIENT
Start: 2021-01-26 | End: 2021-01-27 | Stop reason: HOSPADM

## 2021-01-26 RX ORDER — ASPIRIN 325 MG
325 TABLET, DELAYED RELEASE (ENTERIC COATED) ORAL
COMMUNITY
End: 2021-07-14

## 2021-01-26 RX ORDER — MORPHINE SULFATE 4 MG/ML
4 INJECTION, SOLUTION INTRAMUSCULAR; INTRAVENOUS ONCE
Status: COMPLETED | OUTPATIENT
Start: 2021-01-26 | End: 2021-01-26

## 2021-01-26 RX ORDER — ONDANSETRON 2 MG/ML
INJECTION INTRAMUSCULAR; INTRAVENOUS
Status: COMPLETED
Start: 2021-01-26 | End: 2021-01-26

## 2021-01-26 RX ORDER — HYDROCODONE BITARTRATE AND ACETAMINOPHEN 10; 325 MG/1; MG/1
1 TABLET ORAL EVERY 4 HOURS PRN
Status: DISCONTINUED | OUTPATIENT
Start: 2021-01-26 | End: 2021-01-27 | Stop reason: HOSPADM

## 2021-01-26 RX ORDER — POLYETHYLENE GLYCOL 3350 17 G/17G
17 POWDER, FOR SOLUTION ORAL DAILY PRN
Status: DISCONTINUED | OUTPATIENT
Start: 2021-01-26 | End: 2021-01-27 | Stop reason: HOSPADM

## 2021-01-26 RX ORDER — HYDROMORPHONE HYDROCHLORIDE 1 MG/ML
1 INJECTION, SOLUTION INTRAMUSCULAR; INTRAVENOUS; SUBCUTANEOUS ONCE
Status: COMPLETED | OUTPATIENT
Start: 2021-01-26 | End: 2021-01-26

## 2021-01-26 RX ORDER — METHOCARBAMOL 500 MG/1
500 TABLET, FILM COATED ORAL 3 TIMES DAILY
COMMUNITY
End: 2021-07-14 | Stop reason: DRUGHIGH

## 2021-01-26 RX ADMIN — ALPRAZOLAM 1 MG: 1 TABLET ORAL at 21:00

## 2021-01-26 RX ADMIN — ONDANSETRON 4 MG: 2 INJECTION INTRAMUSCULAR; INTRAVENOUS at 06:41

## 2021-01-26 RX ADMIN — PREGABALIN 50 MG: 50 CAPSULE ORAL at 20:52

## 2021-01-26 RX ADMIN — HYDROCODONE BITARTRATE AND ACETAMINOPHEN 1 TABLET: 10; 325 TABLET ORAL at 10:26

## 2021-01-26 RX ADMIN — MORPHINE SULFATE 4 MG: 4 INJECTION, SOLUTION INTRAMUSCULAR; INTRAVENOUS at 06:41

## 2021-01-26 RX ADMIN — SODIUM CHLORIDE, PRESERVATIVE FREE 10 ML: 5 INJECTION INTRAVENOUS at 20:52

## 2021-01-26 RX ADMIN — FAMOTIDINE 20 MG: 20 TABLET, FILM COATED ORAL at 20:52

## 2021-01-26 RX ADMIN — HYDROMORPHONE HYDROCHLORIDE 1 MG: 1 INJECTION, SOLUTION INTRAMUSCULAR; INTRAVENOUS; SUBCUTANEOUS at 07:35

## 2021-01-26 RX ADMIN — FAMOTIDINE 20 MG: 20 TABLET, FILM COATED ORAL at 10:26

## 2021-01-26 RX ADMIN — HYDROCODONE BITARTRATE AND ACETAMINOPHEN 1 TABLET: 10; 325 TABLET ORAL at 14:55

## 2021-01-26 RX ADMIN — HYDROCODONE BITARTRATE AND ACETAMINOPHEN 1 TABLET: 10; 325 TABLET ORAL at 21:00

## 2021-01-26 RX ADMIN — ONDANSETRON HYDROCHLORIDE 4 MG: 2 SOLUTION INTRAMUSCULAR; INTRAVENOUS at 06:41

## 2021-01-26 ASSESSMENT — ENCOUNTER SYMPTOMS
VOMITING: 0
DIARRHEA: 0
ABDOMINAL DISTENTION: 0
WHEEZING: 0
SHORTNESS OF BREATH: 0
NAUSEA: 0
EYE PAIN: 0
ABDOMINAL PAIN: 0
CONSTIPATION: 0
COUGH: 0
SORE THROAT: 0
RHINORRHEA: 0
PHOTOPHOBIA: 0
BACK PAIN: 0
TROUBLE SWALLOWING: 0
CHEST TIGHTNESS: 0
COLOR CHANGE: 0

## 2021-01-26 ASSESSMENT — PAIN SCALES - GENERAL
PAINLEVEL_OUTOF10: 6

## 2021-01-26 ASSESSMENT — PAIN DESCRIPTION - FREQUENCY
FREQUENCY: CONTINUOUS
FREQUENCY: CONTINUOUS

## 2021-01-26 ASSESSMENT — PAIN DESCRIPTION - PROGRESSION: CLINICAL_PROGRESSION: GRADUALLY WORSENING

## 2021-01-26 ASSESSMENT — PAIN DESCRIPTION - PAIN TYPE: TYPE: ACUTE PAIN

## 2021-01-26 ASSESSMENT — PAIN DESCRIPTION - LOCATION: LOCATION: KNEE

## 2021-01-26 ASSESSMENT — PAIN DESCRIPTION - ORIENTATION
ORIENTATION: RIGHT
ORIENTATION: RIGHT

## 2021-01-26 NOTE — ED NOTES
Bed: 04  Expected date:   Expected time:   Means of arrival:   Comments:  Knee pain       Larry Mendez RN  01/26/21 [id number]

## 2021-01-26 NOTE — LETTER
Pt is in room 533 and ready for dc today.   Lainey Olvera,  at [facility]  [phone] phone  [phone] fax  [phone] CELL (MARIE [address])

## 2021-01-26 NOTE — H&P
Hospitalist: History and Physical    Date: 2021 Time: 8:18 AM    Name: Farzaneh Valdes : 1965 MRN: [id number]    Code Status: Prior No additional code details    PCP: Maynor Paredes    Patient's Chief Complaint Is:    Fall with leg pain    HPI:    47-year-old female with hypertension, tobacco abuse, degenerative disc disease with chronic pain, presents to the ED after she slipped and sustained a fall from standing at home yesterday evening around 8:309 PM.  Afterwards she noticed significant right lower extremity pain and unable to bear weight on right lower extremity. In the ED, plain films and CT of right lower extremity showed comminuted right tibial plateau fracture. Orthopedic surgery consulted from the ED and will see in consultation. Patient otherwise afebrile and hemodynamically stable. Labs fairly unremarkable aside from leukocytosis with WBC 19.5.         Past Medical History:   Diagnosis Date    Anxiety     Asthma     Back pain     Colon polyps     DDD (degenerative disc disease), cervical     Depression     Essential hypertension 10/19/2016    GERD (gastroesophageal reflux disease)     Hiatal hernia     HTN (hypertension)     Post-menopausal     Shoulder pain     Tobacco abuse 10/19/2016     Past Surgical History:   Procedure Laterality Date    GALLBLADDER SURGERY      HERNIA REPAIR      LAP BAND  2009    LUMBAR FUSION      LUMBAR SPINE SURGERY N/A 2020    TLIF L3-4, L4-5 performed by Marifer Balderas DO at [facility] OR     Family History   Problem Relation Age of Onset    Parkinsonism Mother     Alzheimer's Disease Mother     Stroke Father     Substance Abuse Brother      Social History     Socioeconomic History    Marital status:      Spouse name: Not on file    Number of children: Not on file    Years of education: Not on file    Highest education level: Not on file   Occupational History    Not on file   Social Needs I have reviewed all pertinent history. Prior medical records and laboratory evaluation reviewed. Imaging independently reviewed. Review of Systems:   As per HPI, otherwise all other ROS performed and found to be negative at this time. Physical Exam:  Vitals:    01/26/21 0752   BP: (!) 140/84   Pulse: 80   Resp: 16   Temp: 98.2 °F (36.8 °C)   SpO2: 94%     CONSTITUTIONAL: No acute distress  PSYCH: Judgement and insight are normal. Mental status alert and oriented to person, place and time. Mood and affect are normal  HEENT: Normocephalic/atraumatic, no scleral icterus  NECK: Trachea is midline. LUNGS: Normal respiratory effort, no intercostal retractions or accessory muscle use. CARDIOVASCULAR: Regular rate and rhythm, peripheral pulses symmetric  GI/ABDOMEN: Soft, non-tender, non-distended, no guarding or rebound. Bowel sounds are normal.   SKIN: Warm and dry. NEUROLOGICAL: No focal deficits, neuro vascularly intact.    EXTREMITIES: RLE in brace/immobilizer    Labs:   Recent Results (from the past 72 hour(s))   CBC Auto Differential    Collection Time: 01/26/21  7:45 AM   Result Value Ref Range    WBC 19.5 (H) 4.8 - 10.8 K/uL    RBC 4.31 4.20 - 5.40 M/uL    Hemoglobin 12.9 12.0 - 16.0 g/dL    Hematocrit 41.7 37.0 - 47.0 %    MCV 96.8 81.0 - 99.0 fL    MCH 29.9 27.0 - 31.0 pg    MCHC 30.9 (L) 33.0 - 37.0 g/dL    RDW 15.1 (H) 11.5 - 14.5 %    Platelets 725 019 - 155 K/uL    MPV 9.4 9.4 - 12.3 fL    Neutrophils % 85.8 (H) 50.0 - 65.0 %    Lymphocytes % 6.9 (L) 20.0 - 40.0 %    Monocytes % 6.1 0.0 - 10.0 %    Eosinophils % 0.3 0.0 - 5.0 %    Basophils % 0.3 0.0 - 1.0 %    Neutrophils Absolute 16.8 (H) 1.5 - 7.5 K/uL    Immature Granulocytes # 0.1 K/uL    Lymphocytes Absolute 1.4 1.1 - 4.5 K/uL    Monocytes Absolute 1.20 (H) 0.00 - 0.90 K/uL    Eosinophils Absolute 0.10 0.00 - 0.60 K/uL    Basophils Absolute 0.10 0.00 - 0.20 K/uL   APTT    Collection Time: 01/26/21  7:45 AM   Result Value Ref Range aPTT 22.4 (L) 26.0 - 36.2 sec   Protime-INR    Collection Time: 01/26/21  7:45 AM   Result Value Ref Range    Protime 12.7 12.0 - 14.6 sec    INR 0.96 0.88 - 1.18       Imaging: Xr Shoulder Right (min 2 Views)    Result Date: 1/4/2021  Examination. XR SHOULDER RIGHT (MIN 2 VIEWS) 1/4/2021 2:38 PM History: Chronic pain in both shoulders. The frontal and Y view of the right shoulder are obtained. There is no previous study for comparison. There is no evidence of a recent fracture or dislocation. The glenohumeral articulation is intact. There is moderate degenerative arthropathy of the acromioclavicular joint. The acromiohumeral space is in the normal range. There is moderate irregularity of the greater tuberosity probably representing an osteophytes/chronic degenerative process. The scapula and the visualized right ribs are unremarkable. No acute bony abnormality. If symptoms persist please obtain MR imaging of the right shoulder. Signed by Dr Marcela Friedman on 1/4/2021 3:59 PM    Xr Knee Left (1-2 Views)    Result Date: 1/4/2021  EXAMINATION:  XR KNEE LEFT (1-2 VIEWS)  1/4/2021 3:59 PM HISTORY: Left knee pain. COMPARISON: No comparison study. TECHNIQUE: 2 views were obtained. FINDINGS:  There is severe narrowing of the lateral and patellofemoral joint spaces with spurring. There is mild narrowing of the medial compartment with spurring. No fracture is identified. There may be a small amount of fluid in the suprapatellar bursa region. 1. No evidence of acute fracture. Probable small joint effusion. 2. Degenerative osteoarthritis.  Signed by Dr Betty Alva on 1/4/2021 3:59 PM    Xr Knee Right (1-2 Views)    Result Date: 1/26/2021 History: Fall with pain Right knee: 2 views the right knee are obtained. There is comminuted fracture of the lateral right tibial plateau with mild depression along the lateral articulating surface by approximately 5 mm. Fractures extend into the posterior intercondylar surface. Moderate arthritic change of the right knee. Small lipohemarthrosis. Proximal fibula and distal femur appear intact. 1. Comminuted right lateral tibial plateau fracture. Signed by Dr Syd Patel on 1/26/2021 7:07 AM    Ct Knee Right Wo Contrast    Result Date: 1/26/2021  CT right knee 1/26/2021 5:14 AM HISTORY: Right knee injury with suspected fracture TECHNIQUE: Axial images of the right knee were obtained without IV contrast. Coronal and sagittal reformatted images are reconstructed and reviewed. COMPARISON: Plain films 1/26/2021. DLP: 270 mGy cm Automated exposure control was utilized to minimize patient radiation dose. FINDINGS: There is a comminuted tibial plateau fracture with mild depression along the lateral articulating surface. There is a large bony fragment from the central posterior intercondylar region with displacement by approximately 7 mm. Small intra-articular bony fracture fragments are noted. The proximal fibula and the distal femur are preserved. There is tricompartmental osteoarthritis with moderate arthritic changes. There is a small knee joint effusion with regional soft tissue edema. 1. Acute comminuted right tibial plateau fracture with mild depression of the lateral articulating surface with small joint effusion. Comments: A preliminary report is issued to the ER by the [facility] radiology service. I agree with this impression.  Signed by Dr Syd Patel on 1/26/2021 7:03 AM    Xr Shoulder Left (min 2 Views)    Result Date: 1/4/2021 EXAMINATION:  XR SHOULDER LEFT (MIN 2 VIEWS)  1/4/2021 3:58 PM HISTORY: Left shoulder pain. COMPARISON: No comparison study. TECHNIQUE: 3 views were obtained. FINDINGS: There is no fracture or dislocation. The joint spaces are well maintained. The bones are well mineralized. No acute bony abnormality. Signed by Dr Josué Ramires on 1/4/2021 3:58 PM    Xr Chest Portable    Result Date: 1/26/2021  Examination. XR CHEST PORTABLE 1/26/2021 6:36 AM History: Preop evaluation. A single, frontal, portable, upright view of the chest is compared with the previous study dated 7/21/2020. The lungs are moderately well-expanded. There is a small area of focal pleural thickening along the left lower lateral chest wall which was not noted in the previous study. There is no pleural effusion, pulmonary congestion or pneumothorax. The heart size in the normal range. No acute bony abnormality. A small area of focal pleural thickening along the lower left lateral chest wall may represent a chronic inflammatory process? Christi Moffett Further follow-up may be obtained. No active infiltrate or pulmonary congestion. Signed by Dr Cristal Boo on 1/26/2021 7:49 AM      Assessment/Plan:     Principal Problem:    Tibial fracture  Active Problems:    Essential hypertension    Tobacco abuse    Chronic bilateral low back pain with bilateral sciatica    Pain medication agreement    Chronic pain of left knee    S/p tibial fracture  Resolved Problems:    * No resolved hospital problems.  *       Comminuted Right Tibial Fracture  --Orthopedic Surgery on board  --Bedrest  --pain control  --supportive care    Leukocytosis, suspect reactive  Chest x-ray shows left lateral pleural thickening that appears chronic, possibly inflammatory process  Monitor CBC  Check urinalysis    HTN  --monitor, hold antihypertensives prior to surgery, resume when appropriate    Tobacco abuse  --Nicotine patch prn    Chronic pain  --pain control as above PT/OT tomorrow after surgery    N.p.o. until orthopedic surgery evaluation  Maintenance IV fluids    DVT prophylaxis- hold AC prior to surgery    Recommend outpatient follow-up chest imaging to follow area of left lateral pleural thickening noted on chest x-ray here    Doretha Downey  1/26/2021 8:18 AM

## 2021-01-26 NOTE — ED PROVIDER NOTES
[facility] EMERGENCY DEPT  EMERGENCY DEPARTMENT ENCOUNTER      Pt Name: Shikha Ferrara  MRN: [id number]  [place] 1965  Date of evaluation: 1/26/2021  Provider: Cielo Watt MD    [address] [place] [address]       Chief Complaint   Patient presents with    Knee Pain     Pt slipped on wet cardboard which caused her to fall states her right leg went out to the side. Pt denies any hip pain only c/o is right knee pain. HISTORY OF PRESENT ILLNESS   (Location/Symptom, Timing/Onset,Context/Setting, Quality, Duration, Modifying Factors, Severity)  Note limiting factors. Shikha Ferrara is a 60 y.o. female who presents to the emergency department for a right knee injury. Last night around 2030 patient slipped coming back from taking the garbage out. She landed awkwardly with her knee under her. Patient had immediate pain. She had difficulty getting back to her feet. When she was able to she iced the injury, elevated it and took her typical pain medication. This morning when she got up to go to the bathroom there was no significant improvement and she actually felt that the pain was worse. At this point she called EMS. [facility]    NursingNotes were reviewed. REVIEW OF SYSTEMS    (2-9 systems for level 4, 10 or more for level 5)     Review of Systems   Constitutional: Negative for activity change, appetite change, chills, fatigue and fever. HENT: Negative for congestion, ear pain, rhinorrhea, sore throat and trouble swallowing. Eyes: Negative for photophobia, pain and visual disturbance. Respiratory: Negative for cough, chest tightness, shortness of breath and wheezing. Cardiovascular: Negative for chest pain, palpitations and leg swelling. Gastrointestinal: Negative for abdominal distention, abdominal pain, constipation, diarrhea, nausea and vomiting. Genitourinary: Negative for difficulty urinating, dysuria, flank pain, urgency, vaginal bleeding and vaginal discharge. Musculoskeletal: Positive for arthralgias (Right knee), gait problem (Difficult secondary to the right knee injury) and joint swelling (Right knee). Negative for back pain, myalgias and neck stiffness. Skin: Negative for color change, pallor and rash. Neurological: Negative for dizziness, weakness, light-headedness, numbness and headaches. Psychiatric/Behavioral: Negative for agitation, behavioral problems, confusion, hallucinations and suicidal ideas. PAST MEDICALHISTORY     Past Medical History:   Diagnosis Date    Anxiety     Asthma     Back pain     Colon polyps     DDD (degenerative disc disease), cervical     Depression     Essential hypertension 10/19/2016    GERD (gastroesophageal reflux disease)     Hiatal hernia     HTN (hypertension)     Post-menopausal     Shoulder pain     Tobacco abuse 10/19/2016         SURGICAL HISTORY       Past Surgical History:   Procedure Laterality Date    GALLBLADDER SURGERY      HERNIA REPAIR      LAP BAND  08/28/2009    LUMBAR FUSION  2020    LUMBAR SPINE SURGERY N/A 8/5/2020    TLIF L3-4, L4-5 performed by Gloria Martin DO at [address] [place] [address]     Previous Medications    ACETAMINOPHEN (TYLENOL) 325 MG TABLET    Take 325 mg by mouth every 6 hours as needed for Pain     ALPRAZOLAM (XANAX) 1 MG TABLET    Take 1 mg by mouth nightly as needed for Sleep    ASPIRIN 325 MG TABLET    Take 325 mg by mouth daily    BUSPIRONE (BUSPAR) 5 MG TABLET    Take 5 mg by mouth 2 times daily as needed    CLOTRIMAZOLE (LOTRIMIN) 1 % CREAM    Apply topically as needed Apply topically 2 times daily.      DICLOFENAC (VOLTAREN) 75 MG EC TABLET    Take 75 mg by mouth 2 times daily    DIPHENHYDRAMINE HCL (BENADRYL PO)    Take 25 mg by mouth daily as needed     FAMOTIDINE (PEPCID) 20 MG TABLET    Take 20 mg by mouth 2 times daily    HYDROCHLOROTHIAZIDE (MICROZIDE) 12.5 MG CAPSULE    Take 25 mg by mouth daily HYDROCODONE-ACETAMINOPHEN (NORCO)  MG PER TABLET    Take 1 tablet by mouth every 8 hours as needed for Pain for up to 30 days. Intended supply: 30 days    LISINOPRIL (PRINIVIL;ZESTRIL) 20 MG TABLET    Take 10 mg by mouth daily     METHOCARBAMOL (ROBAXIN) 500 MG TABLET    Take 500 mg by mouth 3 times daily    MUPIROCIN (BACTROBAN) 2 % OINTMENT    Apply topically as needed Apply topically 3 times daily. [place]STATIN (MYCOSTATIN) 090779 UNIT/GM CREAM    Apply topically as needed Apply topically 2 times daily. PREGABALIN (LYRICA) 50 MG CAPSULE    Take 1 capsule by mouth 2 times daily for 30 days.  May fill 1/13/21       ALLERGIES     Daypro [oxaprozin], Doxepin, Gabapentin, Penicillins, Elavil [amitriptyline hcl], Erythromycin, Meloxicam, Percocet [oxycodone-acetaminophen], Soma [carisoprodol], Citalopram, Demerol hcl [meperidine], and Statins    FAMILY HISTORY       Family History   Problem Relation Age of Onset    Parkinsonism Mother     Alzheimer's Disease Mother     Stroke Father     Substance Abuse Brother           SOCIAL HISTORY       Social History     Socioeconomic History    Marital status:      Spouse name: None    Number of children: None    Years of education: None    Highest education level: None   Occupational History    None   Social Needs    Financial resource strain: None    Food insecurity     Worry: None     Inability: None    Transportation needs     Medical: None     Non-medical: None   Tobacco Use    Smoking status: Current Every Day Smoker     Packs/day: 1.00     Types: Cigarettes     Start date: 10/19/1988    Smokeless tobacco: Never Used   Substance and Sexual Activity    Alcohol use: Not Currently     Comment: rare    Drug use: Never    Sexual activity: None   Lifestyle    Physical activity     Days per week: None     Minutes per session: None    Stress: None   Relationships    Social connections     Talks on phone: None     Gets together: None Attends [employer] service: None     Active member of club or organization: None     Attends meetings of clubs or organizations: None     Relationship status: None    Intimate partner violence     Fear of current or ex partner: None     Emotionally abused: None     Physically abused: None     Forced sexual activity: None   Other Topics Concern    None   Social History Narrative    None       SCREENINGS    Kennedy Coma Scale  Eye Opening: Spontaneous  Best Verbal Response: Oriented  Best Motor Response: Obeys commands  Kennedy Coma Scale Score: 15        PHYSICAL EXAM    (up to 7 for level 4, 8 or more for level 5)     ED Triage Vitals [01/26/21 0459]   BP Temp Temp Source Pulse Resp SpO2 Height Weight   (!) 100/56 98.1 °F (36.7 °C) Oral 109 17 96 % -- --       Physical Exam  Vitals signs and nursing note reviewed. Constitutional:       General: She is not in acute distress. Appearance: Normal appearance. She is well-developed. She is not ill-appearing. HENT:      Head: Normocephalic and atraumatic. Nose: Nose normal.      Mouth/Throat:      Mouth: Mucous membranes are moist.      Pharynx: Oropharynx is clear. Eyes:      Conjunctiva/sclera: Conjunctivae normal.      Pupils: Pupils are equal, round, and reactive to light. Neck:      Musculoskeletal: Normal range of motion and neck supple. Vascular: No JVD. Cardiovascular:      Rate and Rhythm: Normal rate and regular rhythm. Heart sounds: Normal heart sounds. No murmur. Pulmonary:      Effort: Pulmonary effort is normal.      Breath sounds: Normal breath sounds. No wheezing or rales. Abdominal:      General: Bowel sounds are normal. There is no distension. Palpations: Abdomen is soft. Tenderness: There is no abdominal tenderness. There is no guarding or rebound. Musculoskeletal:         General: Swelling, tenderness, deformity and signs of injury present. Right knee: She exhibits decreased range of motion, swelling and bony tenderness. Tenderness found. Medial joint line, lateral joint line, MCL, LCL and patellar tendon tenderness noted. Comments: Unable to perform range of motion of patient's knee secondary to the injury. Distal pulses are appropriate. Skin:     General: Skin is warm and dry. Capillary Refill: Capillary refill takes less than 2 seconds. Neurological:      General: No focal deficit present. Mental Status: She is alert and oriented to person, place, and time. Cranial Nerves: No cranial nerve deficit. Psychiatric:         Mood and Affect: Mood normal.         Behavior: Behavior normal.         Thought Content: Thought content normal.         DIAGNOSTIC RESULTS     EKG: All EKG's areinterpreted by the Emergency Department Physician who either signs or Co-signs this chart in the absence of a cardiologist.    Sinus rhythm with a rate of 79, normal axis, no acute ST elevations or depressions    RADIOLOGY:  Non-plain film images such as CT, Ultrasound and MRI are read by the radiologist. Plain radiographic images are visualized and preliminarily interpreted bythe emergency physician with the below findings:    XR CHEST PORTABLE   Final Result   A small area of focal pleural thickening along the lower   left lateral chest wall may represent a chronic inflammatory process? Adam Alcaraz Further follow-up may be obtained. No active infiltrate or pulmonary congestion. Signed by Dr Keturah Michelle on 1/26/2021 7:49 AM      CT KNEE RIGHT WO CONTRAST   Final Result   1. Acute comminuted right tibial plateau fracture with mild depression   of the lateral articulating surface with small joint effusion. Comments: A preliminary report is issued to the ER by the [facility]   radiology service. I agree with this impression.    Signed by Dr Michelle Cunningham on 1/26/2021 7:03 AM      XR KNEE RIGHT (1-2 VIEWS)   Final Result Unless otherwise noted below, none     Procedures    FINAL IMPRESSION      1. Closed fracture of right tibial plateau, initial encounter          DISPOSITION/PLAN   DISPOSITION Admitted 01/26/2021 08:18:11 AM      PATIENT REFERRED TO:  No follow-up provider specified.     DISCHARGE MEDICATIONS:  New Prescriptions    No medications on file          (Please note that portions of this note were completed with a voice recognition program.  Efforts were made to edit thedictations but occasionally words are mis-transcribed.)    Tanner Rocha MD (electronically signed)Emergency Physician          Nneka Dumont MD  01/26/21 8230

## 2021-01-27 VITALS
HEART RATE: 72 BPM | TEMPERATURE: 97 F | SYSTOLIC BLOOD PRESSURE: 124 MMHG | RESPIRATION RATE: 20 BRPM | OXYGEN SATURATION: 93 % | DIASTOLIC BLOOD PRESSURE: 68 MMHG

## 2021-01-27 DIAGNOSIS — G89.29 CHRONIC BILATERAL LOW BACK PAIN WITH BILATERAL SCIATICA: ICD-10-CM

## 2021-01-27 DIAGNOSIS — G57.01 PIRIFORMIS SYNDROME OF RIGHT SIDE: ICD-10-CM

## 2021-01-27 DIAGNOSIS — M54.42 CHRONIC BILATERAL LOW BACK PAIN WITH BILATERAL SCIATICA: ICD-10-CM

## 2021-01-27 DIAGNOSIS — M54.41 CHRONIC BILATERAL LOW BACK PAIN WITH BILATERAL SCIATICA: ICD-10-CM

## 2021-01-27 DIAGNOSIS — M54.16 LUMBAR RADICULOPATHY: ICD-10-CM

## 2021-01-27 DIAGNOSIS — M43.10 ANTEROLISTHESIS: ICD-10-CM

## 2021-01-27 LAB
ANION GAP SERPL CALCULATED.3IONS-SCNC: 9 MMOL/L (ref 7–19)
BASOPHILS ABSOLUTE: 0.1 K/UL (ref 0–0.2)
BASOPHILS RELATIVE PERCENT: 0.5 % (ref 0–1)
BUN BLDV-MCNC: 16 MG/DL (ref 6–20)
CALCIUM SERPL-MCNC: 8.9 MG/DL (ref 8.6–10)
CHLORIDE BLD-SCNC: 101 MMOL/L (ref 98–111)
CO2: 28 MMOL/L (ref 22–29)
CREAT SERPL-MCNC: 0.6 MG/DL (ref 0.5–0.9)
EOSINOPHILS ABSOLUTE: 0.2 K/UL (ref 0–0.6)
EOSINOPHILS RELATIVE PERCENT: 1.6 % (ref 0–5)
GFR AFRICAN AMERICAN: >59
GFR NON-AFRICAN AMERICAN: >60
GLUCOSE BLD-MCNC: 103 MG/DL (ref 74–109)
HCT VFR BLD CALC: 36.5 % (ref 37–47)
HEMOGLOBIN: 11.2 G/DL (ref 12–16)
IMMATURE GRANULOCYTES #: 0 K/UL
LYMPHOCYTES ABSOLUTE: 1.9 K/UL (ref 1.1–4.5)
LYMPHOCYTES RELATIVE PERCENT: 18.6 % (ref 20–40)
MCH RBC QN AUTO: 29.9 PG (ref 27–31)
MCHC RBC AUTO-ENTMCNC: 30.7 G/DL (ref 33–37)
MCV RBC AUTO: 97.3 FL (ref 81–99)
MONOCYTES ABSOLUTE: 1.1 K/UL (ref 0–0.9)
MONOCYTES RELATIVE PERCENT: 10.6 % (ref 0–10)
NEUTROPHILS ABSOLUTE: 6.8 K/UL (ref 1.5–7.5)
NEUTROPHILS RELATIVE PERCENT: 68.4 % (ref 50–65)
PDW BLD-RTO: 15.1 % (ref 11.5–14.5)
PLATELET # BLD: 315 K/UL (ref 130–400)
PMV BLD AUTO: 9.5 FL (ref 9.4–12.3)
POTASSIUM REFLEX MAGNESIUM: 4 MMOL/L (ref 3.5–5)
RBC # BLD: 3.75 M/UL (ref 4.2–5.4)
SODIUM BLD-SCNC: 138 MMOL/L (ref 136–145)
WBC # BLD: 10 K/UL (ref 4.8–10.8)

## 2021-01-27 PROCEDURE — 6360000002 HC RX W HCPCS: Performed by: STUDENT IN AN ORGANIZED HEALTH CARE EDUCATION/TRAINING PROGRAM

## 2021-01-27 PROCEDURE — 36415 COLL VENOUS BLD VENIPUNCTURE: CPT

## 2021-01-27 PROCEDURE — 97165 OT EVAL LOW COMPLEX 30 MIN: CPT

## 2021-01-27 PROCEDURE — 6370000000 HC RX 637 (ALT 250 FOR IP): Performed by: STUDENT IN AN ORGANIZED HEALTH CARE EDUCATION/TRAINING PROGRAM

## 2021-01-27 PROCEDURE — 97530 THERAPEUTIC ACTIVITIES: CPT

## 2021-01-27 PROCEDURE — 80048 BASIC METABOLIC PNL TOTAL CA: CPT

## 2021-01-27 PROCEDURE — 2580000003 HC RX 258: Performed by: STUDENT IN AN ORGANIZED HEALTH CARE EDUCATION/TRAINING PROGRAM

## 2021-01-27 PROCEDURE — 97161 PT EVAL LOW COMPLEX 20 MIN: CPT

## 2021-01-27 PROCEDURE — 85025 COMPLETE CBC W/AUTO DIFF WBC: CPT

## 2021-01-27 RX ORDER — ASPIRIN 325 MG
325 TABLET, DELAYED RELEASE (ENTERIC COATED) ORAL 2 TIMES DAILY
Qty: 28 TABLET | Refills: 0
Start: 2021-01-27 | End: 2021-07-14

## 2021-01-27 RX ORDER — HYDROCODONE BITARTRATE AND ACETAMINOPHEN 10; 325 MG/1; MG/1
1 TABLET ORAL EVERY 8 HOURS PRN
Qty: 75 TABLET | Refills: 0 | Status: SHIPPED | OUTPATIENT
Start: 2021-02-03 | End: 2021-02-24 | Stop reason: SDUPTHER

## 2021-01-27 RX ORDER — PREGABALIN 50 MG/1
50 CAPSULE ORAL 2 TIMES DAILY
Qty: 60 CAPSULE | Refills: 0 | Status: SHIPPED | OUTPATIENT
Start: 2021-02-12 | End: 2021-03-01 | Stop reason: SDUPTHER

## 2021-01-27 RX ORDER — METHOCARBAMOL 750 MG/1
750 TABLET, FILM COATED ORAL 4 TIMES DAILY PRN
Qty: 120 TABLET | Refills: 2 | Status: SHIPPED | OUTPATIENT
Start: 2021-01-27 | End: 2021-04-26 | Stop reason: SDUPTHER

## 2021-01-27 RX ORDER — METHOCARBAMOL 500 MG/1
500 TABLET, FILM COATED ORAL 3 TIMES DAILY
Status: DISCONTINUED | OUTPATIENT
Start: 2021-01-27 | End: 2021-01-27 | Stop reason: HOSPADM

## 2021-01-27 RX ADMIN — METHOCARBAMOL 500 MG: 500 TABLET ORAL at 15:20

## 2021-01-27 RX ADMIN — HYDROCODONE BITARTRATE AND ACETAMINOPHEN 1 TABLET: 10; 325 TABLET ORAL at 08:44

## 2021-01-27 RX ADMIN — METHOCARBAMOL 500 MG: 500 TABLET ORAL at 10:30

## 2021-01-27 RX ADMIN — HYDROCODONE BITARTRATE AND ACETAMINOPHEN 1 TABLET: 10; 325 TABLET ORAL at 12:43

## 2021-01-27 RX ADMIN — PREGABALIN 50 MG: 50 CAPSULE ORAL at 08:44

## 2021-01-27 RX ADMIN — MORPHINE SULFATE 2 MG: 4 INJECTION, SOLUTION INTRAMUSCULAR; INTRAVENOUS at 05:29

## 2021-01-27 RX ADMIN — FAMOTIDINE 20 MG: 20 TABLET, FILM COATED ORAL at 08:44

## 2021-01-27 RX ADMIN — SODIUM CHLORIDE, PRESERVATIVE FREE 10 ML: 5 INJECTION INTRAVENOUS at 09:15

## 2021-01-27 RX ADMIN — HYDROCODONE BITARTRATE AND ACETAMINOPHEN 1 TABLET: 10; 325 TABLET ORAL at 01:43

## 2021-01-27 ASSESSMENT — PAIN SCALES - GENERAL
PAINLEVEL_OUTOF10: 4
PAINLEVEL_OUTOF10: 10
PAINLEVEL_OUTOF10: 9
PAINLEVEL_OUTOF10: 10

## 2021-01-27 ASSESSMENT — PAIN DESCRIPTION - PROGRESSION: CLINICAL_PROGRESSION: NOT CHANGED

## 2021-01-27 ASSESSMENT — PAIN DESCRIPTION - PAIN TYPE: TYPE: ACUTE PAIN

## 2021-01-27 ASSESSMENT — PAIN DESCRIPTION - ORIENTATION
ORIENTATION: RIGHT
ORIENTATION: RIGHT

## 2021-01-27 NOTE — PROGRESS NOTES
PHYSICAL THERAPY    Unable to see pt at this time due to an active bedrest order. Please discontinue to begin mobility assessment.     Electronically signed by Purvi Vora PT on 1/27/2021 at 7:22 AM

## 2021-01-27 NOTE — CARE COORDINATION
Spoke with patient regarding MD orders for [facility] services. Patient agreeable and has chosen [facility]. Referral Faxed. [address] [place] [address] [phone]. [phone]. Please notify [facility] when patient discharges and fax DC Summary,  DC med list and any new [facility] orders. The Patient was provided with a choice of provider and agrees   with the discharge plan. [x] Yes [] No    Freedom of choice list was provided with basic dialogue that supports the patient's individualized plan of care/goals, treatment preferences and shares the quality data associated with the providers.  [x] Yes [] No  Electronically signed by Harika Fernandez on 1/27/2021 at 1:05 PM

## 2021-01-27 NOTE — CONSULTS
KIKA Kenandy [facility] ANTONELLA [address], [address] [facility]                                  CONSULTATION    PATIENT NAME: Black Lewis                       :        1965  MED REC NO:   [id number]                              ROOM:       [facility]  ACCOUNT NO:   [de-identified]                           ADMIT DATE: 2021  PROVIDER:     Claire Rene MD    CONSULT DATE:  2021    ORTHOPEDIC SURGERY CONSULTATION    TIME:  06:30 p.m. REASON FOR REFERRAL:  Right tibial plateau fracture. HISTORY:  This is a 47-year-old lady who apparently slipped and fell  last night around 8:30 or 9:00 in her garage. She noticed some pain and  unable to bear weight. CT scan and x-rays were done which showed  lateral tibial plateau fracture. She was admitted and Orthopedic  Surgery consultation was asked for. PAST MEDICAL HISTORY:  Anxiety, asthma, back pain, depression, hiatal  hernia. PAST SURGICAL HISTORY:  Include gallbladder surgery, hernia repair, lap  band, and lumbar spine fusion in the past.    SOCIAL HISTORY:  She is , retired teacher. Does admit to smoking  about one pack per day per chart. ALLERGIES:  She has multiple drug allergies that are listed in the  chart. MEDICATIONS:  Include Robaxin, Lyrica, Voltaren, hydrocodone, Tylenol,  BuSpar, hydrochlorothiazide, lisinopril, and Xanax. PHYSICAL EXAMINATION:  GENERAL:  On exam, this is a 47-year-old lady, alert and oriented x3. SPECIFIC ORTHOPEDIC EXAM:  Looking at her right lower extremity, she has  some moderate swelling of her right lower extremity. Her extensor  mechanism is intact. She is able to dorsiflex and plantar flex her  right foot. Palpable DP pulse. Posterior tibial pulse little more  difficult to feel, but her foot is warm. Peroneal and tibial nerves  were intact. She does not have any significant effusion.     IMAGING:  X-rays from today show a depressed fracture of the posterolateral tibial plateau. CT scan of her right knee from today  reveals a comminuted fracture with depression of the lateral tibial  plateau, but with a large comminuted fracture of the intercondylar  tibial spine which is displaced. She does have some degenerative  changes which were also seen. IMPRESSION:  1. Right lateral tibial plateau fracture with avulsion of the tibial  spine. 2.  Elevated BMI. 3.  History of nicotine use. 4.  History of pre-existing osteoarthritis. PLAN:  At this point, I talked to her at length. I think the patient's  best option looking at all these factors is to let the fracture heal.  I  would have her being weight bear as needed using her knee immobilizer  and just enough to _____ get through her steps. She can move the  immobilizer while she is in bed and work on some general motion. I will  see her back in the office in 4 to 6 weeks. After this heals, I think  her definitive treatment will be a total knee replacement once the bones  have healed and having symptoms from her arthritis. I do not think it  would be worthwhile to try to fix her tibial plateau only to turn around  and perform a knee replacement 6 months after that. I think this would  complicate the issues.         Laureano Wiggins MD    D: 01/26/2021 20:08:16      T: 01/26/2021 21:10:11     [email or web address]  Job#: [id number]     Doc#: [id number]    CC:

## 2021-01-27 NOTE — DISCHARGE SUMMARY
Discharge Summary    NAME: Eneida Clemens  :  1965  MRN:  [id number]    Admit date:  2021  Discharge date:  2021    Admitting Physician:  Iglesia Islas MD    Advance Directive: Full Code    Consults: orthopedic surgery    Primary Care Physician:  Jamin Hyman    Discharge Diagnoses:  Principal Problem:    Tibial fracture    Essential hypertension    Tobacco abuse    Chronic bilateral low back pain with bilateral sciatica    Pain medication agreement    Chronic pain of left knee    S/p tibial fracture      Significant Diagnostic Studies:   Xr Knee Right (1-2 Views)    Result Date: 2021  History: Fall with pain Right knee: 2 views the right knee are obtained. There is comminuted fracture of the lateral right tibial plateau with mild depression along the lateral articulating surface by approximately 5 mm. Fractures extend into the posterior intercondylar surface. Moderate arthritic change of the right knee. Small lipohemarthrosis. Proximal fibula and distal femur appear intact. 1. Comminuted right lateral tibial plateau fracture.  Signed by Dr Evans De La Cruz on 2021 7:07 AM    Ct Knee Right Wo Contrast    Result Date: 2021 CT right knee 1/26/2021 5:14 AM HISTORY: Right knee injury with suspected fracture TECHNIQUE: Axial images of the right knee were obtained without IV contrast. Coronal and sagittal reformatted images are reconstructed and reviewed. COMPARISON: Plain films 1/26/2021. DLP: 270 mGy cm Automated exposure control was utilized to minimize patient radiation dose. FINDINGS: There is a comminuted tibial plateau fracture with mild depression along the lateral articulating surface. There is a large bony fragment from the central posterior intercondylar region with displacement by approximately 7 mm. Small intra-articular bony fracture fragments are noted. The proximal fibula and the distal femur are preserved. There is tricompartmental osteoarthritis with moderate arthritic changes. There is a small knee joint effusion with regional soft tissue edema. 1. Acute comminuted right tibial plateau fracture with mild depression of the lateral articulating surface with small joint effusion. Comments: A preliminary report is issued to the ER by the [facility] radiology service. I agree with this impression. Signed by Dr Jyoti Brenner on 1/26/2021 7:03 AM    Xr Chest Portable    Result Date: 1/26/2021  Examination. XR CHEST PORTABLE 1/26/2021 6:36 AM History: Preop evaluation. A single, frontal, portable, upright view of the chest is compared with the previous study dated 7/21/2020. The lungs are moderately well-expanded. There is a small area of focal pleural thickening along the left lower lateral chest wall which was not noted in the previous study. There is no pleural effusion, pulmonary congestion or pneumothorax. The heart size in the normal range. No acute bony abnormality. A small area of focal pleural thickening along the lower left lateral chest wall may represent a chronic inflammatory process? Marietta Colon Further follow-up may be obtained. No active infiltrate or pulmonary congestion. Signed by Dr Lj Elliott on 1/26/2021 7:49 AM      Pertinent Labs:   CBC:   Recent Labs     01/26/21  0745 01/27/21  0257   WBC 19.5* 10.0   HGB 12.9 11.2*    315     BMP:    Recent Labs     01/26/21  0745 01/27/21  0257    138   K 4.4 4.0    101   CO2 28 28   BUN 20 16   CREATININE 0.6 0.6   GLUCOSE 114* 103     INR:   Recent Labs     01/26/21 0745   INR 0.96             Hospital Course:    75-year-old female with osteoarthritis, hypertension, tobacco abuse, degenerative disc disease with chronic pain, presents to the ED after she slipped and sustained a fall from standing at home. Plain films and CT of right lower extremity showed comminuted right tibial plateau fracture. Orthopedic surgery evaluated patient following admission the next day and felt best option would be to let the fracture heal on its own without surgery. Recommended weightbearing as needed with use of knee immobilizer. Orthopedics recommended outpatient follow-up in 4-6 weeks to allow fracture to heal, and at that time could consider definitive treatment with total knee replacement given her significant osteoarthritis once bones have healed. Patient worked with physical therapy. Stable for discharge on 1/27/2021 with home health PT (high intensity 5 times per week) and arranged for DME with walker/wheelchair. Follow-up with Dr. Bea Perry with orthopedic surgery in 4 to 6 weeks. Contact PCP for hospital follow-up this week. Physical Exam:  Vital Signs: BP (!) 119/59   Pulse 70   Temp 97.4 °F (36.3 °C) (Temporal)   Resp 20   SpO2 94%   CONSTITUTIONAL: No acute distress  PSYCH: Judgement and insight are normal. Mental status alert and oriented to person, place and time.  Mood and affect are normal HEENT: Normocephalic/atraumatic, no scleral icterus  LUNGS: Normal respiratory effort, no intercostal retractions or accessory muscle use. CARDIOVASCULAR: Regular rate and rhythm, peripheral pulses symmetric  GI/ABDOMEN: Soft, non-tender, non-distended, no guarding or rebound. Bowel sounds are normal.   SKIN: Warm and dry. EXTREMITIES: RLE in brace/immobilizer    Discharge Medications:       [address] Medication Instructions [id number]    Printed on:01/27/21 1012   Medication Information                      acetaminophen (TYLENOL) 325 MG tablet  Take 325 mg by mouth every 6 hours as needed for Pain              aspirin 325 MG EC tablet  Take 325 mg by mouth every 3 days             busPIRone (BUSPAR) 5 MG tablet  Take 5 mg by mouth 2 times daily as needed             clotrimazole (LOTRIMIN) 1 % cream  Apply topically as needed Apply topically 2 times daily. diclofenac (VOLTAREN) 75 MG EC tablet  Take 75 mg by mouth 2 times daily             diphenhydrAMINE HCl (BENADRYL PO)  Take 25 mg by mouth daily as needed              famotidine (PEPCID) 20 MG tablet  Take 20 mg by mouth 2 times daily             hydrochlorothiazide (MICROZIDE) 12.5 MG capsule  Take 25 mg by mouth daily              HYDROcodone-acetaminophen (NORCO)  MG per tablet  Take 1 tablet by mouth every 8 hours as needed for Pain for up to 30 days. Intended supply: 30 days             lisinopril (PRINIVIL;ZESTRIL) 20 MG tablet  Take 10 mg by mouth daily              methocarbamol (ROBAXIN) 500 MG tablet  Take 500 mg by mouth 3 times daily             mupirocin (BACTROBAN) 2 % ointment  Apply topically as needed Apply topically 3 times daily. nystatin (MYCOSTATIN) 485469 UNIT/GM cream  Apply topically as needed Apply topically 2 times daily. pregabalin (LYRICA) 50 MG capsule  Take 1 capsule by mouth 2 times daily for 30 days.  May fill 1/13/21                 Discharge Instructions: Follow up with Leon Rodríguez.  Call for appointment. Follow up with Dr Carlton Block regarding fracture. Take medications as directed. Resume activity as tolerated. Disposition: Patient is medically stable and will be discharged home with home health. Time spent on discharge over 30 minutes.       Signed:  Marychuy Lopez MD  1/27/2021 10:12 AM

## 2021-01-27 NOTE — PROGRESS NOTES
Orthopedic Surgery Progress Note    [facility]  1/27/2021      Subjective:     Systemic or Specific Complaints:  Pain to right knee. Objective:     Patient Vitals for the past 24 hrs:   BP Temp Temp src Pulse Resp SpO2   01/27/21 0635 (!) 119/59 97.4 °F (36.3 °C) Temporal 71 20 94 %   01/27/21 0242 107/65 96.9 °F (36.1 °C) Temporal 67 18 97 %   01/26/21 2236 (!) 113/58 97.1 °F (36.2 °C) Temporal 68 18 93 %   01/26/21 1941 121/68 97.9 °F (36.6 °C) Temporal 69 18 98 %   01/26/21 1452 106/67 96.9 °F (36.1 °C) Temporal 64 18 93 %   01/26/21 0952 137/72   78 20 98 %       right lower  General: alert, appears stated age and cooperative   Wound: none             Dressing: none   Extremity: Immobilizer is on. Distal NVI           DVT Exam: No evidence of DVT seen on physical exam.                   Data Review:  Recent Labs     01/26/21  0745 01/27/21  0257   HGB 12.9 11.2*     Recent Labs     01/27/21  0257      K 4.0   CREATININE 0.6       Assessment:     POD# none right comminuted tibial plateau fracture    Plan:      1:  DVT prophylaxis, ICE, elevate  2:  Pain control  3:  Physical therapy/Occupational therapy:  Okay to wt bear to right lower extremity with immobilizer on and with walker. Wt. Bearing to be safe with next step as tolerated. May have immobilizer off while in bed for gentle ROM.   4:  Anticipate discharge Per Medical team if pain well controlled  5: see PT/OT note       Electronically signed by Sina Langston PA-C on 1/27/2021 at 8:54 AM

## 2021-01-27 NOTE — PROGRESS NOTES
Pain Assessment: 0-10  Pain Level: 10  Pain Type: Acute pain  Pain Location: Leg  Pain Orientation: Right  Pain Descriptors: Aching  Clinical Progression: Not changed  Vital Signs  Pulse: 70  Level of Consciousness: Alert (0)  Patient Currently in Pain: Yes  Social/Functional History  Social/Functional History  Lives With: Spouse  Type of Home: House  Home Layout: One level  Home Access: Stairs to enter with rails  Entrance Stairs - Number of Steps: 2 DEEP STEPS  ADL Assistance: Independent  Homemaking Assistance: Independent  Ambulation Assistance: Independent  Transfer Assistance: Independent  Active : Yes  Additional Comments: Pt WORRIES ABOUT HER  NEEDING TO ASSIST SINCE SHE USUALLY DOES ALL  HOUSEHOLD TASKS       Objective   Vision: Within Functional Limits  Hearing: Within functional limits    Orientation  Overall Orientation Status: Within Normal Limits        ADL  Feeding: Independent  Grooming: Independent  UE Bathing: Supervision  LE Bathing: Moderate assistance  UE Dressing: Supervision  LE Dressing: Moderate assistance  Toileting:  Moderate assistance           Transfers  Stand Step Transfers: Minimal assistance  Sit to stand: Minimal assistance  Transfer Comments: sidestepped to [address] with RW     Cognition  Overall Cognitive Status: WNL                 LUE AROM (degrees)  LUE AROM : WNL  RUE AROM (degrees)  RUE AROM : WNL  RUE Strength  Gross RUE Strength: WFL                   Plan   Plan  Times per week: 3-5x/week  Times per day: Daily    G-Code     OutComes Score                                                  AM-PAC Score             Goals  Short term goals  Time Frame for Short term goals: 1 week  Short term goal 1: Supervision with toilet tfers with RW  Short term goal 2: Supervision with seated LB dsg with AE  Short term goal 3: Supervision with clothing mgmt in standing  Long term goals  Long term goal 1: Return to PLOF       Therapy Time

## 2021-01-27 NOTE — PROGRESS NOTES
CLINICAL PHARMACY NOTE: MEDS TO [address] Select Patient?: No  Total # of Prescriptions Filled: 1   The following medications were delivered to the patient:  · Eliquis 2.5 mg  Total # of Interventions Completed: 0  Time Spent (min): 15    Additional Documentation:

## 2021-01-27 NOTE — PROGRESS NOTES
Physical Therapy    Facility/Department: [facility] SURG SERVICES  Initial Assessment    NAME: Ale Pavon  : 1965  MRN: [id number]    Date of Service: 2021    Discharge Recommendations:  Continue to assess pending progress, Home with Home health PT, 24 hour supervision or assist   PT Equipment Recommendations  Equipment Needed: Yes  Other: RW, W/C with elevating legrest, BSC (pt checking with family)    Assessment   Body structures, Functions, Activity limitations: Decreased functional mobility ; Decreased ADL status; Decreased ROM; Decreased strength;Decreased balance; Increased pain  Assessment: Pt C/O MOD-SEVERE PAIN WITH MOVEMENT, BUT VERY MILD WHEN AT REST. STOOD EOB WITH MINIMAL WEIGHT THROUGH RLE, USING RW. TOOK 3-4 SMALL STEPS SIDEWAYS. DISCUSSED LIKELY DME NEEDS AND HOME SAFETY. PT Education: PT Role;Plan of Care;Precautions; Equipment;Weight-bearing Education  REQUIRES PT FOLLOW UP: Yes  Activity Tolerance  Activity Tolerance: Patient Tolerated treatment well;Patient limited by pain       Patient Diagnosis(es): The encounter diagnosis was Closed fracture of right tibial plateau, initial encounter. has a past medical history of Anxiety, Asthma, Back pain, Colon polyps, DDD (degenerative disc disease), cervical, Depression, Essential hypertension, GERD (gastroesophageal reflux disease), Hiatal hernia, HTN (hypertension), Post-menopausal, Shoulder pain, and Tobacco abuse.   has a past surgical history that includes Gallbladder surgery; Lap Band (2009); hernia repair; lumbar fusion (); Lumbar spine surgery (N/A, 2020); and Cholecystectomy.     Restrictions  Restrictions/Precautions  Restrictions/Precautions: Fall Risk, Weight Bearing  Required Braces or Orthoses?: Yes  Lower Extremity Weight Bearing Restrictions  Right Lower Extremity Weight Bearing: Weight Bearing As Tolerated(MD ALLOWING ENOUGH WB (WITH WALKER) TO BE SAFE WITH TRANSFERS AND GT)  Required Braces or Orthoses Current Treatment Recommendations: Strengthening, Balance Training, Transfer Training, Functional Mobility Training, Gait Training, Stair training, Patient/Caregiver Education & Training, Safety Education & Training  Safety Devices  Type of devices: Bed alarm in place, Call light within reach    G-Code       OutComes Score                                                  AM-PAC Score             Goals  Short term goals  Time Frame for Short term goals: 14 DAYS  Short term goal 1: BED MOB MOD IND  Short term goal 2: TRANSFERS SBA  Short term goal 3: AMB 50' RW SBA  Short term goal 4: UP/DOWN 2 STEPS MIN ASSIST       Therapy Time   Individual Concurrent Group Co-treatment   Time In           Time Out           Minutes                   Lesvia Benitez, PT

## 2021-01-27 NOTE — PROGRESS NOTES
Patient is stable medically and since fx is non surgical, patient is able to discharge home, home health is set up and patient is to follow up with Dr Isaac Carey in 6 weeks. Medication of eliquis started for twice daily x 4 weeks and then to start ASA 325mg twice daily x 2 more weeks, patient had w/c delivered to hospital. Discharge instructions gone over with patient and written copies given to patient, patient is discharging in stable condition in care of self and home health.

## 2021-01-27 NOTE — PROGRESS NOTES
Physical Therapy  Name: Chase Brennan  MRN:  [id number]  Date of service:  1/27/2021 01/27/21 1346   Restrictions/Precautions   Restrictions/Precautions Fall Risk;Weight Bearing   Required Braces or Orthoses? Yes   Lower Extremity Weight Bearing Restrictions   Right Lower Extremity Weight Bearing Weight Bearing As Tolerated   Required Braces or Orthoses   Right Lower Extremity Brace Knee Immobilizer   General   Chart Reviewed Yes   Subjective   Subjective Pt agreeable to work with therapy. Pain Screening   Patient Currently in Pain Yes   Pain Assessment   Pain Assessment 0-10   Pain Level 9  (states that she recently received pain medication)   Pain Type Acute pain   Pain Location Leg   Pain Orientation Right   Pain Descriptors Aching   Non-Pharmaceutical Pain Intervention(s) Ambulation/Increased Activity;Repositioned   Bed Mobility   Supine to Sit Stand by assistance   Sit to Supine Stand by assistance   Transfers   Sit to Stand Minimal Assistance   Stand to sit Minimal Assistance   Comment Stand/step to [facility] then back to bed with min assist and vc's for correct technique   Short term goals   Time Frame for Short term goals 14 DAYS   Short term goal 1 BED MOB MOD IND   Short term goal 2 TRANSFERS SBA   Short term goal 3 AMB 50' RW SBA   Short term goal 4 UP/DOWN 2 STEPS MIN ASSIST   Conditions Requiring Skilled Therapeutic Intervention   Body structures, Functions, Activity limitations Decreased functional mobility ; Decreased ADL status; Decreased ROM; Decreased strength;Decreased balance; Increased pain   Assessment Pt cont to have increased pain during TF but otherwise very minimal. Requires assist to stand with rwx and TF to [facility], then TF back to bed. Pt concerned about getting from her front door to her bedroom at the back of the house, discussed further DME needs and RN notified. Pt in supine with all needs in reach following tx.    Activity Tolerance Activity Tolerance Patient Tolerated treatment well;Patient limited by pain   Safety Devices   Type of devices Bed alarm in place;Call light within reach;Gait belt;Left in bed         Electronically signed by Eliana Kate PTA on 1/27/2021 at 2:16 PM

## 2021-01-27 NOTE — DISCHARGE INSTR - DIET
? Good nutrition is important when healing from an illness, injury, or surgery. Follow any nutrition recommendations given to you during your hospital stay. ? If you were given an oral nutrition supplement while in the hospital, continue to take this supplement at home. You can take it with meals, in-between meals, and/or before bedtime. These supplements can be purchased at most local grocery stores, pharmacies, and chain [employer]-stores. ? If you have any questions about your diet or nutrition, call the hospital and ask for the dietitian.     GENERAL DIET

## 2021-01-27 NOTE — CARE COORDINATION
SW spoke with Pt regarding DME for walker and/or wheelchair. Pt stated she had a walker at home already. Pt stated the wheelchair would not fit in her home. SW gave the DME orders to the patient and asked for her to contact [facility] if she gets home and realize she does need one of the items.    Electronically signed by Carol Lee on 1/27/2021 at 12:49 PM

## 2021-01-29 LAB
EKG P AXIS: 60 DEGREES
EKG P-R INTERVAL: 166 MS
EKG Q-T INTERVAL: 394 MS
EKG QRS DURATION: 86 MS
EKG QTC CALCULATION (BAZETT): 425 MS
EKG T AXIS: 26 DEGREES

## 2021-02-01 ENCOUNTER — TELEPHONE (OUTPATIENT)
Dept: PAIN MANAGEMENT | Age: 56
End: 2021-02-01

## 2021-02-01 NOTE — TELEPHONE ENCOUNTER
Patient left voicemail on  line, forwarded to nurse line. Patient states that she has an MRI scheduled tomorrow but she needs to cancel that due to having fallen and fractured her leg. I have cancelled the appointment at this time, and returned call to patient to let her know that when she is recovered she may call to reschedule the MRI at any time.

## 2021-02-24 DIAGNOSIS — M54.42 CHRONIC BILATERAL LOW BACK PAIN WITH BILATERAL SCIATICA: ICD-10-CM

## 2021-02-24 DIAGNOSIS — G89.29 CHRONIC BILATERAL LOW BACK PAIN WITH BILATERAL SCIATICA: ICD-10-CM

## 2021-02-24 DIAGNOSIS — M54.41 CHRONIC BILATERAL LOW BACK PAIN WITH BILATERAL SCIATICA: ICD-10-CM

## 2021-02-24 RX ORDER — HYDROCODONE BITARTRATE AND ACETAMINOPHEN 10; 325 MG/1; MG/1
1 TABLET ORAL EVERY 8 HOURS PRN
Qty: 75 TABLET | Refills: 0 | Status: SHIPPED | OUTPATIENT
Start: 2021-03-06 | End: 2021-03-30 | Stop reason: SDUPTHER

## 2021-03-01 DIAGNOSIS — M54.16 LUMBAR RADICULOPATHY: ICD-10-CM

## 2021-03-02 RX ORDER — PREGABALIN 50 MG/1
50 CAPSULE ORAL 2 TIMES DAILY
Qty: 60 CAPSULE | Refills: 0 | Status: SHIPPED | OUTPATIENT
Start: 2021-03-14 | End: 2021-03-30 | Stop reason: SDUPTHER

## 2021-03-30 DIAGNOSIS — M54.16 LUMBAR RADICULOPATHY: ICD-10-CM

## 2021-03-30 DIAGNOSIS — M54.42 CHRONIC BILATERAL LOW BACK PAIN WITH BILATERAL SCIATICA: ICD-10-CM

## 2021-03-30 DIAGNOSIS — M54.41 CHRONIC BILATERAL LOW BACK PAIN WITH BILATERAL SCIATICA: ICD-10-CM

## 2021-03-30 DIAGNOSIS — G89.29 CHRONIC BILATERAL LOW BACK PAIN WITH BILATERAL SCIATICA: ICD-10-CM

## 2021-04-01 RX ORDER — HYDROCODONE BITARTRATE AND ACETAMINOPHEN 10; 325 MG/1; MG/1
1 TABLET ORAL EVERY 6 HOURS PRN
Qty: 120 TABLET | Refills: 0 | Status: SHIPPED | OUTPATIENT
Start: 2021-04-05 | End: 2021-04-26 | Stop reason: SDUPTHER

## 2021-04-01 RX ORDER — PREGABALIN 50 MG/1
50 CAPSULE ORAL 2 TIMES DAILY
Qty: 60 CAPSULE | Refills: 0 | Status: SHIPPED | OUTPATIENT
Start: 2021-04-02 | End: 2021-04-26 | Stop reason: SDUPTHER

## 2021-04-26 DIAGNOSIS — G89.29 CHRONIC BILATERAL LOW BACK PAIN WITH BILATERAL SCIATICA: ICD-10-CM

## 2021-04-26 DIAGNOSIS — M54.16 LUMBAR RADICULOPATHY: ICD-10-CM

## 2021-04-26 DIAGNOSIS — G57.01 PIRIFORMIS SYNDROME OF RIGHT SIDE: ICD-10-CM

## 2021-04-26 DIAGNOSIS — M54.41 CHRONIC BILATERAL LOW BACK PAIN WITH BILATERAL SCIATICA: ICD-10-CM

## 2021-04-26 DIAGNOSIS — M43.10 ANTEROLISTHESIS: ICD-10-CM

## 2021-04-26 DIAGNOSIS — M54.42 CHRONIC BILATERAL LOW BACK PAIN WITH BILATERAL SCIATICA: ICD-10-CM

## 2021-04-26 NOTE — TELEPHONE ENCOUNTER
Ov can 2/16/21 due to weather next ov 6/14/21. Decreasing norco back to original dose. Last month was a one time increase.

## 2021-04-27 RX ORDER — PREGABALIN 50 MG/1
50 CAPSULE ORAL 2 TIMES DAILY
Qty: 60 CAPSULE | Refills: 0 | Status: SHIPPED | OUTPATIENT
Start: 2021-05-04 | End: 2021-05-26 | Stop reason: SDUPTHER

## 2021-04-27 RX ORDER — METHOCARBAMOL 750 MG/1
750 TABLET, FILM COATED ORAL 4 TIMES DAILY PRN
Qty: 120 TABLET | Refills: 2 | Status: SHIPPED | OUTPATIENT
Start: 2021-04-27 | End: 2021-07-27 | Stop reason: SDUPTHER

## 2021-04-27 RX ORDER — HYDROCODONE BITARTRATE AND ACETAMINOPHEN 10; 325 MG/1; MG/1
1 TABLET ORAL EVERY 8 HOURS PRN
Qty: 75 TABLET | Refills: 0 | Status: SHIPPED | OUTPATIENT
Start: 2021-05-05 | End: 2021-05-26 | Stop reason: SDUPTHER

## 2021-05-03 ENCOUNTER — TELEPHONE (OUTPATIENT)
Dept: NEUROSURGERY | Age: 56
End: 2021-05-03

## 2021-05-03 NOTE — TELEPHONE ENCOUNTER
Patient left message stating she would like to r/s her appointment with Jenifer Lim.  Please call her at [phone]

## 2021-05-10 ENCOUNTER — OFFICE VISIT (OUTPATIENT)
Dept: NEUROSURGERY | Age: 56
End: 2021-05-10
Payer: COMMERCIAL

## 2021-05-10 ENCOUNTER — HOSPITAL ENCOUNTER (OUTPATIENT)
Dept: GENERAL RADIOLOGY | Age: 56
Discharge: HOME | End: 2021-05-10
Payer: COMMERCIAL

## 2021-05-10 VITALS
HEART RATE: 77 BPM | BODY MASS INDEX: 37.11 KG/M2 | DIASTOLIC BLOOD PRESSURE: 81 MMHG | SYSTOLIC BLOOD PRESSURE: 137 MMHG | OXYGEN SATURATION: 98 % | WEIGHT: 223 LBS

## 2021-05-10 DIAGNOSIS — Z98.1 S/P LUMBAR FUSION: Primary | ICD-10-CM

## 2021-05-10 DIAGNOSIS — Z98.1 S/P LUMBAR FUSION: ICD-10-CM

## 2021-05-10 PROCEDURE — 99213 OFFICE O/P EST LOW 20 MIN: CPT | Performed by: NURSE PRACTITIONER

## 2021-05-10 PROCEDURE — 72100 X-RAY EXAM L-S SPINE 2/3 VWS: CPT

## 2021-05-10 RX ORDER — ASPIRIN 81 MG/1
81 TABLET ORAL DAILY
COMMUNITY
End: 2021-07-14

## 2021-05-10 RX ORDER — ALPRAZOLAM 1 MG/1
1 TABLET ORAL NIGHTLY PRN
COMMUNITY

## 2021-05-10 ASSESSMENT — ENCOUNTER SYMPTOMS
RESPIRATORY NEGATIVE: 1
GASTROINTESTINAL NEGATIVE: 1
EYES NEGATIVE: 1

## 2021-05-10 NOTE — PROGRESS NOTES
18 [facility] Office Visit      Chief Complaint   Patient presents with    Follow-up       HISTORY OF PRESENT ILLNESS:      Brynn Weldon is a 60 y.o. female who underwent a TLIF of L3-4, L4-5 for spondylolisthesis of L3-4 on 8/05/2020 and now she is 9 months out from her surgery. Prior to surgery she complained of low back pain and bilateral lower extremity pain that radiated into the buttocks and posterior thighs. She reported 60% was back pain and 40% was BLE pain. Today she states she is doing very well. She hardly has any back pain or leg pain. She states that she fell and broke her RIGHT leg in January which was definitely a set back. She is now walking with a pope due to the foot. She states she has healed well. She truly has no other complaints. Past Medical History:   Diagnosis Date    Anxiety     Asthma     Back pain     Colon polyps     DDD (degenerative disc disease), cervical     Depression     Essential hypertension 10/19/2016    GERD (gastroesophageal reflux disease)     Hiatal hernia     HTN (hypertension)     Post-menopausal     Shoulder pain     Tobacco abuse 10/19/2016       Past Surgical History:   Procedure Laterality Date    CHOLECYSTECTOMY      GALLBLADDER SURGERY      HERNIA REPAIR      LAP BAND  08/28/2009    LUMBAR FUSION  2020    LUMBAR SPINE SURGERY N/A 8/5/2020    TLIF L3-4, L4-5 performed by Diana Diaz DO at [address] OR        Medications    Current Outpatient Medications:     ALPRAZolam (XANAX) 1 MG tablet, Take 1 mg by mouth nightly as needed for Sleep., Disp: , Rfl:     aspirin 81 MG EC tablet, Take 81 mg by mouth daily, Disp: , Rfl:     pregabalin (LYRICA) 50 MG capsule, Take 1 capsule by mouth 2 times daily for 30 days. (may fill 5/4/21), Disp: 60 capsule, Rfl: 0    HYDROcodone-acetaminophen (NORCO)  MG per tablet, Take 1 tablet by mouth every 8 hours as needed for Pain (month supply) for up to 30 days.  may fill 5/5/21 dose decrease, Disp: 75 tablet, Rfl: 0    methocarbamol (ROBAXIN-750) 750 MG tablet, Take 1 tablet by mouth 4 times daily as needed (muscle spasm), Disp: 120 tablet, Rfl: 2    diclofenac (VOLTAREN) 75 MG EC tablet, Take 75 mg by mouth 2 times daily, Disp: , Rfl:     acetaminophen (TYLENOL) 325 MG tablet, Take 325 mg by mouth every 6 hours as needed for Pain , Disp: , Rfl:     busPIRone (BUSPAR) 5 MG tablet, Take 5 mg by mouth 2 times daily as needed, Disp: , Rfl:     clotrimazole (LOTRIMIN) 1 % cream, Apply topically as needed Apply topically 2 times daily. , Disp: , Rfl:     famotidine (PEPCID) 20 MG tablet, Take 20 mg by mouth 2 times daily, Disp: , Rfl:     mupirocin (BACTROBAN) 2 % ointment, Apply topically as needed Apply topically 3 times daily. , Disp: , Rfl:     hydrochlorothiazide (MICROZIDE) 12.5 MG capsule, Take 25 mg by mouth daily , Disp: , Rfl:     lisinopril (PRINIVIL;ZESTRIL) 20 MG tablet, Take 10 mg by mouth daily , Disp: , Rfl:     apixaban (ELIQUIS) 2.5 MG TABS tablet, Take 1 tablet by mouth 2 times daily (Patient not taking: Reported on 5/10/2021), Disp: 60 tablet, Rfl: 0    aspirin 325 MG EC tablet, Take 1 tablet by mouth 2 times daily (Patient not taking: Reported on 5/10/2021), Disp: 28 tablet, Rfl: 0    methocarbamol (ROBAXIN) 500 MG tablet, Take 500 mg by mouth 3 times daily, Disp: , Rfl:     aspirin 325 MG EC tablet, Take 325 mg by mouth every 3 days, Disp: , Rfl:     diphenhydrAMINE HCl (BENADRYL PO), Take 25 mg by mouth daily as needed , Disp: , Rfl:     nystatin (MYCOSTATIN) 690164 UNIT/GM cream, Apply topically as needed Apply topically 2 times daily.  , Disp: , Rfl:   Daypro [oxaprozin], Doxepin, Gabapentin, Penicillins, Elavil [amitriptyline hcl], Erythromycin, Meloxicam, Percocet [oxycodone-acetaminophen], Soma [carisoprodol], Citalopram, Demerol hcl [meperidine], and Statins    Social History  Social History     Tobacco Use   Smoking Status Current Every Day Smoker    Packs/day: 1.00    Types: Cigarettes    Start date: 10/19/1988   Smokeless Tobacco Never Used     Social History     Substance and Sexual Activity   Alcohol Use Not Currently    Comment: rare         Family History   Problem Relation Age of Onset   [facility] Parkinsonism Mother    [facility] Alzheimer's Disease Mother     Stroke Father     Substance Abuse Brother        Review of Systems:  Constitutional: Negative. HENT: Negative. Eyes: Negative. Respiratory: Negative. Cardiovascular: Negative. Gastrointestinal: Negative. Genitourinary: Negative. Musculoskeletal: Negative. Skin: Negative. Neurological: Negative. Endo/Heme/Allergies: Negative. Psychiatric/Behavioral: Negative. PHYSICAL EXAM:  Vitals:    05/10/21 1443   BP: 137/81   Pulse: 77   SpO2: 98%     Constitutional: The patient appears well-developed andwell-nourished. Eyes  conjunctiva normal.  Conjugate gaze  Ear, nose, throat -No scars, masses, or lesions over external nose or ears, no atrophy of tongue  Neck-symmetric, no masses noted, no jugular vein distension  Respiration- chest wall appears symmetric, goodexpansion, normal effort without use of accessory muscles  Musculoskeletal  no significant wasting of muscles noted, no bony deformities, gait no gross ataxia  Extremities-no clubbing, cyanosis or edema  Skin warm, dry, and intact. No rash, erythema, or pallor. Psychiatric  mood, affect, and behavior appear normal.     Neurologic Examination  Awake, Alert andoriented x 3  Normal speech pattern, following commands  Motor 5/5 all extremities  No deficits to light touch     Using a pope at today's appointment due to recent right leg fracture. Wound:   Well healed       DATA and IMAGING:    Lab Results   Component Value Date    WBC 10.0 01/27/2021    HGB 11.2 (L) 01/27/2021    HCT 36.5 (L) 01/27/2021    MCV 97.3 01/27/2021     01/27/2021     Lab Results   Component Value Date     01/27/2021 K 4.0 01/27/2021     01/27/2021    CO2 28 01/27/2021    BUN 16 01/27/2021    CREATININE 0.6 01/27/2021    GLUCOSE 103 01/27/2021    CALCIUM 8.9 01/27/2021    PROT 7.1 01/26/2021    LABALBU 4.3 01/26/2021    BILITOT <0.2 01/26/2021    ALKPHOS 106 (H) 01/26/2021    AST 9 01/26/2021    ALT 9 01/26/2021    LABGLOM >60 01/27/2021    GFRAA >59 01/27/2021     Lab Results   Component Value Date    INR 0.96 01/26/2021    INR 0.88 07/31/2020    PROTIME 12.7 01/26/2021    PROTIME 11.8 (L) 07/31/2020    No results found. Narrative   EXAMINATION: XR LUMBAR SPINE (2-3 VIEWS) 5/10/2021 2:02 PM   HISTORY: Six-month postop follow-up   AP and lateral views lumbar spine are obtained. Screws are present in the pedicles at L3-L4 and L5. Interbody disc   space devices at L3-4 and L4-5 levels are present. Postsurgical   changes stable compared to November 19, 2020. Surgical clips are present in the right upper quadrant. SI joints are normal.   LAP-BAND is present in the left upper quadrant.       Impression   Status post instrumented fusion L3 L4 L5   Signed by Dr Lindsay Arzate on 5/10/2021 2:03 PM   I have personally reviewed the images and my interpretation is:  Hardware in good position, bony formation noted at both levels        ASSESSMENT:   Cuate Snell is a 62 y.o. female who underwent a TLIF of L3-4, L4-5 for spondylolisthesis of L3-4 on 8/05/2020 and now she is 9 months out from her surgery. PLAN:    Follow up 6 months w /XR lumbar          ICD-10-CM    1.  S/P lumbar fusion  Z98.1 XR LUMBAR SPINE (2-3 VIEWS)        DAYSI Castle

## 2021-05-25 ENCOUNTER — HOSPITAL ENCOUNTER (OUTPATIENT)
Dept: MRI IMAGING | Age: 56
Discharge: HOME | End: 2021-05-25
Payer: COMMERCIAL

## 2021-05-25 DIAGNOSIS — G89.29 CHRONIC PAIN OF BOTH SHOULDERS: ICD-10-CM

## 2021-05-25 DIAGNOSIS — M25.512 CHRONIC PAIN OF BOTH SHOULDERS: ICD-10-CM

## 2021-05-25 DIAGNOSIS — M25.511 CHRONIC PAIN OF BOTH SHOULDERS: ICD-10-CM

## 2021-05-25 PROCEDURE — 73221 MRI JOINT UPR EXTREM W/O DYE: CPT

## 2021-05-26 DIAGNOSIS — G89.29 CHRONIC BILATERAL LOW BACK PAIN WITH BILATERAL SCIATICA: ICD-10-CM

## 2021-05-26 DIAGNOSIS — M54.42 CHRONIC BILATERAL LOW BACK PAIN WITH BILATERAL SCIATICA: ICD-10-CM

## 2021-05-26 DIAGNOSIS — M54.16 LUMBAR RADICULOPATHY: ICD-10-CM

## 2021-05-26 DIAGNOSIS — M54.41 CHRONIC BILATERAL LOW BACK PAIN WITH BILATERAL SCIATICA: ICD-10-CM

## 2021-05-26 RX ORDER — PREGABALIN 50 MG/1
50 CAPSULE ORAL 2 TIMES DAILY
Qty: 60 CAPSULE | Refills: 0 | Status: SHIPPED | OUTPATIENT
Start: 2021-06-05 | End: 2021-05-27

## 2021-05-26 RX ORDER — HYDROCODONE BITARTRATE AND ACETAMINOPHEN 10; 325 MG/1; MG/1
1 TABLET ORAL EVERY 8 HOURS PRN
Qty: 75 TABLET | Refills: 0 | Status: SHIPPED | OUTPATIENT
Start: 2021-06-04 | End: 2021-05-27

## 2021-05-27 DIAGNOSIS — M54.41 CHRONIC BILATERAL LOW BACK PAIN WITH BILATERAL SCIATICA: ICD-10-CM

## 2021-05-27 DIAGNOSIS — M25.511 CHRONIC RIGHT SHOULDER PAIN: Primary | ICD-10-CM

## 2021-05-27 DIAGNOSIS — G89.29 CHRONIC BILATERAL LOW BACK PAIN WITH BILATERAL SCIATICA: ICD-10-CM

## 2021-05-27 DIAGNOSIS — M54.42 CHRONIC BILATERAL LOW BACK PAIN WITH BILATERAL SCIATICA: ICD-10-CM

## 2021-05-27 DIAGNOSIS — M54.16 LUMBAR RADICULOPATHY: ICD-10-CM

## 2021-05-27 DIAGNOSIS — G89.29 CHRONIC RIGHT SHOULDER PAIN: Primary | ICD-10-CM

## 2021-05-27 RX ORDER — PREGABALIN 50 MG/1
50 CAPSULE ORAL 2 TIMES DAILY
Qty: 60 CAPSULE | Refills: 0 | Status: SHIPPED | OUTPATIENT
Start: 2021-06-05 | End: 2021-06-14 | Stop reason: DRUGHIGH

## 2021-05-27 RX ORDER — HYDROCODONE BITARTRATE AND ACETAMINOPHEN 10; 325 MG/1; MG/1
1 TABLET ORAL EVERY 8 HOURS PRN
Qty: 75 TABLET | Refills: 0 | Status: SHIPPED | OUTPATIENT
Start: 2021-06-04 | End: 2021-09-09

## 2021-05-27 NOTE — TELEPHONE ENCOUNTER
Scripts were sent to [facility] pharmacy on accident. Should have gone to [facility] . Will cancel scripts at [facility].

## 2021-05-27 NOTE — TELEPHONE ENCOUNTER
----- Message from DAYSI Gates CNP sent at 5/26/2021  4:46 PM CDT -----  Notify Patient - orthopedic consult recommended  Full thickness nearly full width tear of supraspinatus tendon with mild retractation  Partial thickness tear of infraspinatus tendon  Retracted proximal tear of biceps tendon with tear in the distal supraspinatus muscle. Chronic arthritic degeneration and changes with suspected impingement of supraspinatus muscle.

## 2021-06-14 ENCOUNTER — HOSPITAL ENCOUNTER (OUTPATIENT)
Dept: PAIN MANAGEMENT | Age: 56
Discharge: HOME | End: 2021-06-14
Payer: COMMERCIAL

## 2021-06-14 VITALS
SYSTOLIC BLOOD PRESSURE: 143 MMHG | OXYGEN SATURATION: 97 % | DIASTOLIC BLOOD PRESSURE: 82 MMHG | HEART RATE: 62 BPM | RESPIRATION RATE: 18 BRPM | BODY MASS INDEX: 38.32 KG/M2 | HEIGHT: 65 IN | WEIGHT: 230 LBS | TEMPERATURE: 99 F

## 2021-06-14 DIAGNOSIS — M54.42 CHRONIC BILATERAL LOW BACK PAIN WITH BILATERAL SCIATICA: Primary | ICD-10-CM

## 2021-06-14 DIAGNOSIS — M54.41 CHRONIC BILATERAL LOW BACK PAIN WITH BILATERAL SCIATICA: Primary | ICD-10-CM

## 2021-06-14 DIAGNOSIS — G89.29 CHRONIC BILATERAL LOW BACK PAIN WITH BILATERAL SCIATICA: Primary | ICD-10-CM

## 2021-06-14 PROCEDURE — 99213 OFFICE O/P EST LOW 20 MIN: CPT

## 2021-06-14 PROCEDURE — 99214 OFFICE O/P EST MOD 30 MIN: CPT | Performed by: NURSE PRACTITIONER

## 2021-06-14 RX ORDER — PREGABALIN 50 MG/1
50 CAPSULE ORAL 3 TIMES DAILY
Qty: 90 CAPSULE | Refills: 2 | Status: SHIPPED | OUTPATIENT
Start: 2021-06-14 | End: 2021-07-14

## 2021-06-14 ASSESSMENT — PAIN DESCRIPTION - PAIN TYPE: TYPE: CHRONIC PAIN

## 2021-06-14 ASSESSMENT — PAIN DESCRIPTION - ORIENTATION: ORIENTATION: LOWER;LEFT;RIGHT

## 2021-06-14 ASSESSMENT — ENCOUNTER SYMPTOMS
BACK PAIN: 1
CONSTIPATION: 0
BOWEL INCONTINENCE: 0

## 2021-06-14 ASSESSMENT — PAIN DESCRIPTION - LOCATION: LOCATION: BACK;SHOULDER;KNEE

## 2021-06-14 ASSESSMENT — PAIN DESCRIPTION - DIRECTION: RADIATING_TOWARDS: INTO BLE

## 2021-06-14 ASSESSMENT — PAIN SCALES - GENERAL: PAINLEVEL_OUTOF10: 7

## 2021-06-14 NOTE — PROGRESS NOTES
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] Teaching [] New Patient [] Picture Obtained    Physician Plan:  [] Outgoing Referral  [] Pharmacy Consult  [] Test Ordered [x] Prescription Ordered/Changed   [] Obtained Test Results / Consult Notes        Complete if patient is withholding blood thinner for procedure     Blood Thinner Patient is currently taking:      [] Plavix (Hold for 7 days)  [] Aspirin (Hold for 5 days)     [] Pletal (Hold for 2 days)  [] Pradaxa (Hold for 3 days)    [] Effient (Hold for 7 days)  [] Xarelto (Hold for 2 days)    [] Eliquis (Hold for 2 days)  [] Brilinta (Hold for 7 days)    [] Coumadin (Hold for 5 days) - (INR needs to be drawn the day prior to procedure- INR < 2.0)    [] Aggrenox (Hold for 7 days)        [] Patient will stop medication on their own.    [] Blood Thinner Form Faxed for approval to hold.    Provider form faxed to:     Assessment Completed by:  Electronically signed by Jessica Fish RN on 6/14/2021 at 2:31 PM

## 2021-06-14 NOTE — PROGRESS NOTES
cramping, shooting and stabbing. Radiates to: BLE. The symptoms are aggravated by position, sitting, standing and twisting (Walking). Associated symptoms include leg pain (improved since surgery) and tingling (improved since surgery). Pertinent negatives include no bladder incontinence or bowel incontinence. Risk factors include obesity. Knee Pain  This is a chronic problem. The current episode started more than 1 year ago. The problem occurs constantly. The problem has been gradually worsening. Associated symptoms include arthralgias, joint swelling and myalgias. The symptoms are aggravated by bending and walking. Shoulder Pain  This is a chronic problem. The current episode started more than 1 year ago. The problem occurs constantly. The problem has been waxing and waning. Associated symptoms include arthralgias, joint swelling and myalgias. The symptoms are aggravated by bending (lifting, reaching, carrying, house hold chorse). Screening Tools:    PEG Score: 7     Last PEG Score: 7     Annual ORT Score: 8     Annual PHQ Score: 6    Current Pain Assessment  Pain Assessment  Pain Assessment: 0-10  Pain Level: 7  Pain Type: Chronic pain  Pain Location: Back, Shoulder, Knee  Pain Orientation: Lower, Left, Right  Pain Radiating Towards: into BLE  POSS Score (Patient Ctrl Analgesia): 1    Past Visit HPI:  1/2021  presents for 3 month follow up. Patient is being seen along with her spouse. Patient had TLIF of L3-4, L4-5 for spondylolisthesis of L3-4 on 8/05/2020. Patient states that her low back is doing well but she is having worsening pain in left knee. She stats that the pain worsens with flexion of left knee. Patient states that she has trouble sleeping due to pain. Patient states that house work causes the pain to worsen and she has trouble the following day. Patient states that she is having pain with bilateral shoulders. Patient states that when she sews or crochets that she has increase her pain. Patient states daily house hold chores and ADLs.     7/23/2020  presented for sooner appointment due to worsening low back pain. Patient is scheduled for TLIF L3/L4 and L4/L5 on 8/5/2020. Patient has under LESI with some relief in symptoms but pain has gotten to where it is significantly affecting patient's ability to do ADLs and have quality of life. Patient's radicular pain is significantly worse and patient is having significant muscle spasms. Patient is very sensitive to medications and she has been hestitant to take medication for radicular pain. However, the pain has gotten severe enough she is willing to try. 5/4/2020  presents to the office for sooner office visit. Patient had right SI, right greater trochanteric bursa and right piriformis injected on 3/11/2020. Patient did have an improvement in her low back pain until 4/27/2020. Patient called the nurse line stating that she had an increase in pain and inflammation. She knew it was too soon for a repeat injection was wondering if there is anything that could be done. Patient requested a Medrol Dosepak. Patient was given Medrol Dosepak over the phone and instructed to call if it did not improve. Patient stated that it improved for a short period of time however walking was making the pain worse. She states that she hurts across lower back and then down bilateral lower extremities straight back to the foot. Patient states that she has noticed that she trips a lot with her right foot. She states that she feels like she is leaning and that she cannot stand straight when ambulating. Patient has known instability in low back and had wanted to postpone low back surgery. However patient is stating that she cannot take the pain any longer and she is wanting something to be done to help relieve the pain. Patient does appear to be in pain. Patient did become tearful during exam.  Patient is usually upbeat and .   Patient states that pain is affecting her quality of life. Back Pain is a chronic problem. The current episode started more than 1 year ago. The problem occurs constantly. The problem has been rapidly worsening since onset. The pain is present in the gluteal, lumbar spine and sacro-iliac. The quality of the pain is described as aching, cramping, stabbing and shooting. Radiates to: BLE. The symptoms are aggravated by position, sitting, standing and twisting (Walking). Associated symptoms include leg pain, numbness, tingling and weakness. Pertinent negatives include no bladder incontinence or bowel incontinence. Risk factors include obesity. 12/2019  presents to the office for procedure follow-up and office visit. Patient had right SI and right trochanteric bursa injection with ultrasound and right piriformis on 10/23/2019. Patient sustained 50% relief for approximately 1 month before diminishing of pain. Patient stated that the injection targeted the area of pain.     Patient requested sooner appointment due to increase in low back pain. Patient had called provider at home regarding acute low back pain with numbness to right lower extremity that had started suddenly on Saturday. 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 Patient stated that she had done fine on Friday and then on Saturday she just had and a sudden acute onset of low back pain that was gripping. At the time pain medication was not controlling her pain. Patient stated that she started to have numbness to her right foot that is intermittent and that when she is having the numbness that she feels as if she may fall. Patient rated her pain a 10 out of a 10 she described it is a stabbing in the low back. Patient states that sitting on the toilet will cause an increase in the radicular pain. Patient states that she has to lean on the shopping cart when she is ambulating to help with the low back pain.   Patient states that cleaning such as sweeping mopping or doing anything side to side significantly increases low back pain. MRI was ordered of the lumbar spine. Patient does have instability that was on x-ray of the lumbar spine.   However at the time the x-ray was taken patient was not having any type of radicular or nerve symptoms.     10/3/19  presents with referral from [place] [id number] primary complaints of chronic low back pain.  Pain has been a gradual onset that started around 2006. Bingham Favors is aggravated by lifting bending and walking.  Patient had imaging done by Dr. Filiberto Eisenmenger in [facility] which indicated she had 2 bulging disks.  Per patient she was told that she had 2 dead disks and 2 bulging disks in the lumbar region. Ric Bellamy was told that the hip pain was deferred from the bulging disks and this is progressively worsened over the past 12 years. Atul Carlin states the pain is now constant and causes a limp.  Patient has been tried on several nerve medications doxepin caused patient to sleepwalk, gabapentin caused patient to lose time, and amitriptyline made her feel as if her blood was boiling.  Patient is tried massage therapy but this causes her to become more tense.  Aggravating factors include ambulation prolonged sitting prolonged standing.  Patient states that she does not hurt in the morning but it progresses as the day goes on.     Other areas of pain that will be addressed at a later time is bilateral shoulder pain, hip pain and left knee pain.     Of note: Patient has numerous medical allergies penicillin causes hives, erythromycin causes hives, doxepin causes sleepwalking, gabapentin causes loss of memory, amitriptyline makes her feel like her blood boiling, Daypro eyes swelled shut, Soma horrible headaches, Percocet nightmares, Demerol nausea, statins muscle spasms, meloxicam irritates stomach ulcers, citalopram causes memory loss, menthol and camphor causes hives    Employment: retired school teacher    Past Medical History  Past Medical History:   Diagnosis Date    Anxiety     Asthma     Back pain     Colon polyps     DDD (degenerative disc disease), cervical     Depression     Essential hypertension 10/19/2016    GERD (gastroesophageal reflux disease)     Hiatal hernia     HTN (hypertension)     Post-menopausal     Shoulder pain     Tobacco abuse 10/19/2016       Surgery History  Past Surgical History:   Procedure Laterality Date    CHOLECYSTECTOMY      GALLBLADDER SURGERY      HERNIA REPAIR      LAP BAND  08/28/2009    LUMBAR FUSION  2020    LUMBAR SPINE SURGERY N/A 8/5/2020    TLIF L3-4, L4-5 performed by Brice Hurtado DO at [address] History  family history includes Alzheimer's Disease in her mother; Parkinsonism in her mother; Stroke in her father; Substance Abuse in her brother. Social History    Social History     Tobacco Use    Smoking status: Current Every Day Smoker     Packs/day: 1.00     Types: Cigarettes     Start date: 10/19/1988    Smokeless tobacco: Never Used   Substance Use Topics    Alcohol use: Not Currently     Comment: rare       Allergies  Daypro [oxaprozin], Doxepin, Gabapentin, Penicillins, Elavil [amitriptyline hcl], Erythromycin, Meloxicam, Percocet [oxycodone-acetaminophen], Soma [carisoprodol], Citalopram, Demerol hcl [meperidine], and Statins     Current Medications  Current Outpatient Medications   Medication Sig Dispense Refill    HYDROcodone-acetaminophen (NORCO)  MG per tablet Take 1 tablet by mouth every 8 hours as needed for Pain (month supply) for up to 30 days. may fill 6/4/21 75 tablet 0    pregabalin (LYRICA) 50 MG capsule Take 1 capsule by mouth 2 times daily for 30 days. (may fill 6/5/21) 60 capsule 0    ALPRAZolam (XANAX) 1 MG tablet Take 1 mg by mouth nightly as needed for Sleep.       aspirin 81 MG EC tablet Take 81 mg by mouth daily      methocarbamol (ROBAXIN-750) 750 MG tablet Take 1 tablet by mouth 4 times noted in HPI    Physical Exam:    Vitals:    06/14/21 1500   BP: (!) 143/82   Pulse: 62   Resp: 18   Temp: 99 °F (37.2 °C)   TempSrc: Temporal   SpO2: 97%   Weight: 230 lb (104.3 kg)   Height: 5' 5\" (1.651 m)       Body mass index is 38.27 kg/m². Physical Exam  Vitals and nursing note reviewed. Constitutional:       General: She is not in acute distress. Appearance: She is well-developed. HENT:      Head: Normocephalic. Right Ear: External ear normal.      Left Ear: External ear normal.      Nose: Nose normal.   Eyes:      Conjunctiva/sclera: Conjunctivae normal.      Pupils: Pupils are equal, round, and reactive to light. Neck:      Vascular: No JVD. Trachea: No tracheal deviation. Cardiovascular:      Rate and Rhythm: Normal rate. Pulmonary:      Effort: Pulmonary effort is normal.   Abdominal:      General: There is no distension. Tenderness: There is no abdominal tenderness. Musculoskeletal:      Right shoulder: Tenderness and bony tenderness present. Decreased range of motion. Left shoulder: Tenderness and bony tenderness present. Decreased range of motion. Lumbar back: Spasms and tenderness present. Decreased range of motion. Right knee: Bony tenderness present. Decreased range of motion. Tenderness present. Left knee: Bony tenderness present. Decreased range of motion. Tenderness present. Comments: + empty can, unable to reach behind back, grimaces with pain during assessment and rom   Skin:     General: Skin is warm and dry. Neurological:      Mental Status: She is alert and oriented to person, place, and time. Cranial Nerves: No cranial nerve deficit. Psychiatric:         Behavior: Behavior normal.         Thought Content:  Thought content normal.         Judgment: Judgment normal.       Labs:  Lab Results   Component Value Date     01/27/2021    K 4.0 01/27/2021     01/27/2021    CO2 28 01/27/2021    BUN 16 01/27/2021 CREATININE 0.6 01/27/2021    GLUCOSE 103 01/27/2021    CALCIUM 8.9 01/27/2021        Lab Results   Component Value Date    WBC 10.0 01/27/2021    HGB 11.2 (L) 01/27/2021    HCT 36.5 (L) 01/27/2021    MCV 97.3 01/27/2021     01/27/2021       Assessment:  Active Problems:    Chronic bilateral low back pain with bilateral sciatica    SI (sacroiliac) joint dysfunction    Piriformis syndrome of right side    Chronic pain of both shoulders    Trochanteric bursitis of right hip    Spondylolisthesis at L3-L4 level    Pain medication agreement    Chronic pain of left knee    S/p tibial fracture  Resolved Problems:    * No resolved hospital problems. *      Plan:  1. Chronic bilateral low back pain with bilateral sciatica  - pregabalin (LYRICA) 50 MG capsule; Take 1 capsule by mouth 3 times daily for 90 days. Dispense: 90 capsule; Refill: 2    Increase Lyrica due to increase in nerve pain from fracture    Continue Robaxin 750 mg 4 times a day as needed for myofascial pain no refill needed at this time    Continue Norco 10 mg every  8 hours prn # 75 with refill sent on 6/4/2021    Start [place]a ER 20 mg every 24 hours with prescription sent to medical director. Schedule Left knee injections with US with Zilretta    Schedule Right TB with US and RIght piriformis trigger point injections as patient has had in past with good relief in pain. Patient to continue to follow with Dr. Elena Noland on knee and has appointment for shoulder consult. [x] Follow up    [] 4 weeks   [x] 6-8 weeks   [] 10-12 weeks   [] 3 months  [] Post procedure to evaluate effectiveness of treatment  [] To evaluate medications changes made at office visit. [] To review diagnostics ordered at last visit. [] To evaluate response to therapy    [x] For management of controlled substance  [x] Random UDS as indicated by ORT score or if indicated by abberent behaviors    Discussion: Discussed exam findings and plan of care with patient.  Patient

## 2021-06-15 RX ORDER — HYDROCODONE BITARTRATE 20 MG/1
20 TABLET, EXTENDED RELEASE ORAL EVERY 24 HOURS
Qty: 30 TABLET | Refills: 0 | Status: SHIPPED | OUTPATIENT
Start: 2021-06-15 | End: 2021-06-28

## 2021-06-28 DIAGNOSIS — M54.42 CHRONIC BILATERAL LOW BACK PAIN WITH BILATERAL SCIATICA: ICD-10-CM

## 2021-06-28 DIAGNOSIS — G89.29 CHRONIC BILATERAL LOW BACK PAIN WITH BILATERAL SCIATICA: ICD-10-CM

## 2021-06-28 DIAGNOSIS — M54.41 CHRONIC BILATERAL LOW BACK PAIN WITH BILATERAL SCIATICA: ICD-10-CM

## 2021-06-29 RX ORDER — HYDROCODONE BITARTRATE 20 MG/1
20 TABLET, EXTENDED RELEASE ORAL EVERY 24 HOURS
Qty: 30 TABLET | Refills: 0 | Status: SHIPPED | OUTPATIENT
Start: 2021-06-29 | End: 2021-07-14

## 2021-07-14 ENCOUNTER — HOSPITAL ENCOUNTER (OUTPATIENT)
Dept: PAIN MANAGEMENT | Age: 56
Discharge: HOME | End: 2021-07-14
Payer: COMMERCIAL

## 2021-07-14 VITALS
TEMPERATURE: 96 F | OXYGEN SATURATION: 95 % | HEART RATE: 87 BPM | SYSTOLIC BLOOD PRESSURE: 106 MMHG | DIASTOLIC BLOOD PRESSURE: 60 MMHG | RESPIRATION RATE: 18 BRPM

## 2021-07-14 DIAGNOSIS — M25.512 CHRONIC PAIN OF BOTH SHOULDERS: Primary | ICD-10-CM

## 2021-07-14 DIAGNOSIS — M43.16 SPONDYLOLISTHESIS AT L3-L4 LEVEL: ICD-10-CM

## 2021-07-14 DIAGNOSIS — G89.29 CHRONIC PAIN OF BOTH SHOULDERS: Primary | ICD-10-CM

## 2021-07-14 DIAGNOSIS — M25.511 CHRONIC PAIN OF BOTH SHOULDERS: Primary | ICD-10-CM

## 2021-07-14 PROCEDURE — 20553 NJX 1/MLT TRIGGER POINTS 3/>: CPT

## 2021-07-14 PROCEDURE — 2500000003 HC RX 250 WO HCPCS

## 2021-07-14 PROCEDURE — 6360000002 HC RX W HCPCS

## 2021-07-14 PROCEDURE — 20553 NJX 1/MLT TRIGGER POINTS 3/>: CPT | Performed by: NURSE PRACTITIONER

## 2021-07-14 PROCEDURE — 20611 DRAIN/INJ JOINT/BURSA W/US: CPT | Performed by: NURSE PRACTITIONER

## 2021-07-14 PROCEDURE — 20611 DRAIN/INJ JOINT/BURSA W/US: CPT

## 2021-07-14 RX ORDER — LIDOCAINE HYDROCHLORIDE 10 MG/ML
10 INJECTION, SOLUTION EPIDURAL; INFILTRATION; INTRACAUDAL; PERINEURAL ONCE
Status: DISCONTINUED | OUTPATIENT
Start: 2021-07-14 | End: 2021-07-16 | Stop reason: HOSPADM

## 2021-07-14 RX ORDER — LIDOCAINE HYDROCHLORIDE 10 MG/ML
1 INJECTION, SOLUTION EPIDURAL; INFILTRATION; INTRACAUDAL; PERINEURAL ONCE
Status: DISCONTINUED | OUTPATIENT
Start: 2021-07-14 | End: 2021-07-16 | Stop reason: HOSPADM

## 2021-07-14 RX ORDER — HYDROCODONE BITARTRATE AND ACETAMINOPHEN 10; 325 MG/1; MG/1
1 TABLET ORAL EVERY 8 HOURS PRN
Qty: 90 TABLET | Refills: 0 | Status: SHIPPED | OUTPATIENT
Start: 2021-07-14 | End: 2021-08-11 | Stop reason: SDUPTHER

## 2021-07-14 RX ORDER — LIDOCAINE HYDROCHLORIDE 10 MG/ML
2 INJECTION, SOLUTION EPIDURAL; INFILTRATION; INTRACAUDAL; PERINEURAL ONCE
Status: DISCONTINUED | OUTPATIENT
Start: 2021-07-14 | End: 2021-07-16 | Stop reason: HOSPADM

## 2021-07-14 RX ORDER — BUPIVACAINE HYDROCHLORIDE 5 MG/ML
10 INJECTION, SOLUTION EPIDURAL; INTRACAUDAL ONCE
Status: DISCONTINUED | OUTPATIENT
Start: 2021-07-14 | End: 2021-07-16 | Stop reason: HOSPADM

## 2021-07-14 RX ORDER — BUPIVACAINE HYDROCHLORIDE 5 MG/ML
1 INJECTION, SOLUTION EPIDURAL; INTRACAUDAL ONCE
Status: DISCONTINUED | OUTPATIENT
Start: 2021-07-14 | End: 2021-07-16 | Stop reason: HOSPADM

## 2021-07-14 RX ORDER — ASPIRIN 81 MG/1
81 TABLET, CHEWABLE ORAL DAILY
COMMUNITY

## 2021-07-14 RX ORDER — TRIAMCINOLONE ACETONIDE 40 MG/ML
40 INJECTION, SUSPENSION INTRA-ARTICULAR; INTRAMUSCULAR ONCE
Status: DISCONTINUED | OUTPATIENT
Start: 2021-07-14 | End: 2021-07-16 | Stop reason: HOSPADM

## 2021-07-14 NOTE — PROCEDURES
[facility] Physical & Pain Medicine    Patient Name: Juan Godwin    : 1965    Age: 60 y.o. Sex: female    Date: 2021    Preop Diagnosis: Osteoarthritis of left Knee(s)    Postop Diagnosis: Osteoarthritis of  left  Knee(s)      Procedure: Injection of  left  Knee(s) with US confirmation    Performing Procedure: VY SargentBC    Patient Vitals for the past 24 hrs:   BP Temp Temp src Pulse Resp SpO2   21 1409 106/60 96 °F (35.6 °C) Temporal 87 18 95 %       Description of Procedure:    After a brief physical assessment and failure to improve with conservative measures the patient presented for an injection of the left Knee(s). The indications, limitations and possible complications were discussed with the patient and the patient elected to proceed with the procedure. left Knee(s)    After voluntary, informed and signed consent obtained the patient was placed in a seated position. Appropriate time out was obtained per policy. The knee was palpated at the anterior aspect of the knee to locate the patellar tendon. The projected injection site is approximately 1 cm medially or laterally of the midpoint of the tendon. Usually, there is a depression. The area was marked. The ultrasound transducer was used to confirm the appropriate location. The skin prepped in an aseptic fashion with CHG swab and then sprayed with Gebauer's Solution. Under aseptic technique 22 gauge 1 1/2 inch needle was then introduced into the Knee(s). After a negative aspiration, 1 ml of 1% Lidocaine Plain was injected into the knee prior to the 5 ml reconstituted Zilretta which consists of  4 ml prepackaged solution that was mixed with Zilretta (32mg/ml) constitute and this was injected into the Knee(s). The needle was withdrawn and a sterile dressing applied. If this was a bilateral procedure the same steps were followed on the opposite side. Discharge:   The patient tolerated the procedure well. There were no complications during the procedure and the patient was discharged home with discharge instructions. The patient has been instructed to contact the office should there be any complications or questions to arise between today and their next appointment. Plan:    The patient will follow up in the office in approximately 6 weeks. DAYSI Dent CNP, 2021 at 4:25 PM     [facility] Physical & Pain Medicine    Patient Name: Virgil Jones    : 1965    Age: 60 y.o. Sex: female    Date: 2021      Preop Diagnosis: right Trochanteric Bursitis    Postop Diagnosis: right Trochanteric Bursitis    Procedure: Ultrasound Guided Injection of right Trochanteric Bursa(s)    Performing Procedure:  CATIE Lanza - BC, TANI    Patient Vitals for the past 24 hrs:   BP Temp Temp src Pulse Resp SpO2   21 1409 106/60 96 °F (35.6 °C) Temporal 87 18 95 %       Description of Procedure:    right Trochanteric Bursa(s)    After voluntary, informed and signed consent obtained the patient was placed in a lateral recumbent position. Appropriate time out was obtained per policy. The trochanteric bursa(s) was palpated for area of maximal tenderness. The area was prepped in an aseptic fashion with CHG swab. The ultrasound transducer was used to confirm the appropriate location. The skin was sprayed with Gebauer's Solution. Under aseptic technique and direct ultrasound visualization a 22 gauge 3 inch spinal needle was introduced into the Trochanteric Bursa(s). After a negative aspiration a solution of 1 ml of 0.5% Marcaine Plain and 1 ml of 1% Lidocaine Plain and 1 ml of Kenalog (40mg/ml) was injected into the Trochanteric Bursa(s). The needle was withdrawn and a sterile dressing applied. If this was a bilateral procedure the same steps were followed on the opposite side.      Date: 2021    Pre-op Diagnosis: Myofascial Pain/ Muscle Spasms    Post-op Diagnosis: Myofascial Pain/ Muscle Spasms    Procedure: Piriformis Trigger Point Injections    Performing Procedure: Yo Ospina, ACAGNP - BC, VA-BC    Patient Vitals for the past 24 hrs:   BP Temp Temp src Pulse Resp SpO2   07/14/21 1409 106/60 96 °F (35.6 °C) Temporal 87 18 95 %       right  Piriformis Trigger Point Injections    Description of Procedure:    After a brief physical assessment and failure to improve with conservative measures the patient presented for Piriformis Trigger Point Injections The indications, limitations and possible complications were discussed with the patient and the patient elected to proceed with the procedure. After voluntary, informed and signed consent obtained the patient was placed in a  left Lateral Recumbent Position     Appropriate time out was obtained per policy. The area of maximal tenderness was palpated over the on right Gluteus minimus,  Gluteus Kurtis, and Piriformis. The skin overlying these areas was marked. The areas were prepped using aseptic technique CHG prep. The proposed injection sites were the sprayed with Gebauer's Solution while protecting patient eyes. Each trigger point of the muscles - Gluteus minimus,  Gluteus Kurtis, and Piriformis was injected with approximately 1-2 ml of a solution of 5 ml of 1% Lidocaine Plain and 5 ml of 0.5% Marcaine Plain after negative aspiration. IF this was a bilateral procedure, the patient was rolled over to the opposite side and the same steps were followed. Discharge: The patient tolerated the procedure well. There were no complications during the procedure and the patient was discharged home with discharge instructions. The patient has been instructed to contact the office should there be any complications or questions to arise between today and their next appointment. Plan:   Will return to the office in  6 weeks for Procedure Follow-up  and Office Visit      DAYSI Bingham CNP, 7/14/2021 at 4:25 PM

## 2021-07-21 ENCOUNTER — TELEPHONE (OUTPATIENT)
Dept: PAIN MANAGEMENT | Age: 56
End: 2021-07-21

## 2021-07-21 NOTE — TELEPHONE ENCOUNTER
Spoke with pt concerning her injections she had on 07/14. Pt states she is currently pain free in the left knee and has done really well with it. She is a 2/10 on pain scale today in the right hip. She states it is better, but is still a little bit sore. Otherwise, patient has no further questions and is very pleased with how the injections have gone.

## 2021-07-27 DIAGNOSIS — M43.10 ANTEROLISTHESIS: ICD-10-CM

## 2021-07-27 DIAGNOSIS — G57.01 PIRIFORMIS SYNDROME OF RIGHT SIDE: ICD-10-CM

## 2021-07-27 RX ORDER — METHOCARBAMOL 750 MG/1
750 TABLET, FILM COATED ORAL 4 TIMES DAILY PRN
Qty: 120 TABLET | Refills: 2 | Status: SHIPPED | OUTPATIENT
Start: 2021-07-27 | End: 2021-09-19 | Stop reason: SDUPTHER

## 2021-08-11 DIAGNOSIS — M43.16 SPONDYLOLISTHESIS AT L3-L4 LEVEL: ICD-10-CM

## 2021-08-11 DIAGNOSIS — M25.512 CHRONIC PAIN OF BOTH SHOULDERS: ICD-10-CM

## 2021-08-11 DIAGNOSIS — G89.29 CHRONIC PAIN OF BOTH SHOULDERS: ICD-10-CM

## 2021-08-11 DIAGNOSIS — M25.511 CHRONIC PAIN OF BOTH SHOULDERS: ICD-10-CM

## 2021-08-12 RX ORDER — HYDROCODONE BITARTRATE AND ACETAMINOPHEN 10; 325 MG/1; MG/1
1 TABLET ORAL EVERY 8 HOURS PRN
Qty: 90 TABLET | Refills: 0 | Status: SHIPPED | OUTPATIENT
Start: 2021-08-13 | End: 2021-09-09 | Stop reason: SDUPTHER

## 2021-09-09 ENCOUNTER — HOSPITAL ENCOUNTER (OUTPATIENT)
Dept: PAIN MANAGEMENT | Age: 56
Discharge: HOME | End: 2021-09-09
Payer: COMMERCIAL

## 2021-09-09 VITALS
SYSTOLIC BLOOD PRESSURE: 112 MMHG | BODY MASS INDEX: 35.16 KG/M2 | OXYGEN SATURATION: 94 % | WEIGHT: 211 LBS | HEIGHT: 65 IN | TEMPERATURE: 97 F | HEART RATE: 72 BPM | DIASTOLIC BLOOD PRESSURE: 76 MMHG

## 2021-09-09 DIAGNOSIS — M17.0 PRIMARY OSTEOARTHRITIS OF BOTH KNEES: ICD-10-CM

## 2021-09-09 DIAGNOSIS — M43.16 SPONDYLOLISTHESIS AT L3-L4 LEVEL: ICD-10-CM

## 2021-09-09 DIAGNOSIS — M17.10 ARTHRITIS OF KNEE: Primary | ICD-10-CM

## 2021-09-09 DIAGNOSIS — G89.29 CHRONIC PAIN OF BOTH SHOULDERS: ICD-10-CM

## 2021-09-09 DIAGNOSIS — M25.512 CHRONIC PAIN OF BOTH SHOULDERS: ICD-10-CM

## 2021-09-09 DIAGNOSIS — M25.511 CHRONIC PAIN OF BOTH SHOULDERS: ICD-10-CM

## 2021-09-09 PROCEDURE — 99213 OFFICE O/P EST LOW 20 MIN: CPT

## 2021-09-09 PROCEDURE — 99214 OFFICE O/P EST MOD 30 MIN: CPT | Performed by: NURSE PRACTITIONER

## 2021-09-09 RX ORDER — SERTRALINE HYDROCHLORIDE 25 MG/1
TABLET, FILM COATED ORAL
COMMUNITY
Start: 2021-07-20 | End: 2022-06-28

## 2021-09-09 RX ORDER — HYDROCODONE BITARTRATE AND ACETAMINOPHEN 10; 325 MG/1; MG/1
1 TABLET ORAL EVERY 8 HOURS PRN
Qty: 90 TABLET | Refills: 0 | Status: SHIPPED | OUTPATIENT
Start: 2021-09-12 | End: 2021-10-13 | Stop reason: SDUPTHER

## 2021-09-09 RX ORDER — CELECOXIB 100 MG/1
100 CAPSULE ORAL 2 TIMES DAILY
Qty: 60 CAPSULE | Refills: 2 | Status: SHIPPED | OUTPATIENT
Start: 2021-09-09 | End: 2021-11-23

## 2021-09-09 ASSESSMENT — ENCOUNTER SYMPTOMS
BACK PAIN: 1
CONSTIPATION: 0
BOWEL INCONTINENCE: 0

## 2021-09-09 ASSESSMENT — PAIN DESCRIPTION - PAIN TYPE: TYPE: CHRONIC PAIN

## 2021-09-09 ASSESSMENT — PAIN DESCRIPTION - LOCATION: LOCATION: BACK;KNEE

## 2021-09-09 ASSESSMENT — PAIN SCALES - GENERAL: PAINLEVEL_OUTOF10: 6

## 2021-09-09 NOTE — PROGRESS NOTES
Clinic Documentation      Education Provided:  [x] Review of Tara Embs  [] Agreement Review  [x] PEG Score Calculated [] PHQ Score Calculated [] ORT Score Calculated    [] Compliance Issues Discussed [] Cognitive Behavior Needs [x] Exercise [] Review of Test [] Financial Issues  [x] Tobacco/Alcohol Use Reviewed [x] Teaching [] New Patient [] Picture Obtained    Physician Plan:  [] Outgoing Referral  [] Pharmacy Consult  [] Test Ordered [x] Prescription Ordered/Changed   [] Obtained Test Results / Consult Notes        Complete if patient is withholding blood thinner for procedure     Blood Thinner Patient is currently taking:      [] Plavix (Hold for 7 days)  [] Aspirin (Hold for 5 days)     [] Pletal (Hold for 2 days)  [] Pradaxa (Hold for 3 days)    [] Effient (Hold for 7 days)  [] Xarelto (Hold for 2 days)    [] Eliquis (Hold for 2 days)  [] Brilinta (Hold for 7 days)    [] Coumadin (Hold for 5 days) - (INR needs to be drawn the day prior to procedure- INR < 2.0)    [] Aggrenox (Hold for 7 days)        [] Patient will stop medication on their own.    [] Blood Thinner Form Faxed for approval to hold.    Provider form faxed to:     Assessment Completed by:  Electronically signed by Colletta Medico on 9/9/2021 at 2:48 PM

## 2021-09-09 NOTE — PROGRESS NOTES
[facility] Physical & Pain Medicine  Office Note    Patient Name: Karishma Javier    MR #: [id number]    Account #: [de-identified]    : 1965    Age: 60 y.o. Sex: female    Date: 2021    PCP: Velinda Leyden    Chief Complaint:   Chief Complaint   Patient presents with    Lower Back Pain       History of Present Illness:     Karishma Javier is a 60 y.o. female who presents for procedure follow-up and office visit. At last appointment patient was started on extended release Hysingla to supplement pain medication. However patient never did get prescription as pharmacy stated medication was not covered by insurance. Shortly after visit patient was hospitalized due to unresponsiveness. According to patient, she was told that the Lyrica is what caused her low oxygen levels resulting in the unresponsiveness. Patient has stopped Lyrica. Patient has been under a lot of stress and has had significant amount of weight loss since last appointment with no appetite. Patient is getting testing through PCP office. Patient had left knee injection on 2021. Patient had at least 85% relief of pain from procedure(s) for at least 6 weeks and was able to increase activity after procedure. Patient received enough pain relief from injections that the patient would like to repeat the injection(s). Shoulder Pain  This is a chronic problem. The current episode started more than 1 year ago. The problem occurs constantly. The problem has been waxing and waning. Associated symptoms include arthralgias, joint swelling and myalgias. The symptoms are aggravated by bending (lifting, reaching, carrying, house hold chorse). Knee Pain  This is a chronic problem. The current episode started more than 1 year ago. The problem occurs constantly. The problem has been waxing and waning. Associated symptoms include arthralgias, joint swelling and myalgias. The symptoms are aggravated by bending and walking.    Back Pain  This is a chronic problem. The current episode started more than 1 year ago. The problem occurs constantly. The problem has been waxing and waning since onset. The pain is present in the gluteal, lumbar spine and sacro-iliac. The quality of the pain is described as aching, cramping, shooting and stabbing. Radiates to: BLE. The symptoms are aggravated by sitting, standing, position and twisting (Walking). Associated symptoms include tingling (improved since surgery). Pertinent negatives include no bladder incontinence, bowel incontinence or leg pain (improved since surgery). Screening Tools:    PEG Score: 7     Last PEG Score: 7     Annual ORT Score: 8     Annual PHQ Score: 6    Current Pain Assessment  Pain Assessment  Pain Assessment: 0-10  Pain Level: 6  Patient's Stated Pain Goal: 3  Pain Type: Chronic pain  Pain Location: Back, Knee    Past Visit HPI:  6/14/2021  Left Knee xray 1/4/2021 Degenerative arthritis. Left Shoulder xray 1/4/2021 unremarkable    Right Shoulder xray 1/4/2021- Degenerative arthritis in [facility] joint irregularity of greater tuberosity with possible chronic degeneration ? Osteophytes (bone spurs) MRI is needed. The irregularities could indicate a chronic tear in Rotator Cuff. MRI of Right Shoulder 5/25/2021 - Full thickness nearly full width tear of supraspinatus tendon with mild retractation Partial thickness tear of infraspinatus tendon Retracted proximal tear of biceps tendon with tear in the distal supraspinatus muscle. Chronic arthritic degeneration and changes with suspected impingement of supraspinatus muscle. 1/2021  presents for 3 month follow up. Patient is being seen along with her spouse. Patient had TLIF of L3-4, L4-5 for spondylolisthesis of L3-4 on 8/05/2020. Patient states that her low back is doing well but she is having worsening pain in left knee. She stats that the pain worsens with flexion of left knee.  Patient states that she has trouble sleeping due to pain. Patient states that house work causes the pain to worsen and she has trouble the following day. Patient states that she is having pain with bilateral shoulders. Patient states that when she sews or crochets that she has increase her pain. Patient states daily house hold chores and ADLs.     7/23/2020  presented for sooner appointment due to worsening low back pain. Patient is scheduled for TLIF L3/L4 and L4/L5 on 8/5/2020. Patient has under LESI with some relief in symptoms but pain has gotten to where it is significantly affecting patient's ability to do ADLs and have quality of life. Patient's radicular pain is significantly worse and patient is having significant muscle spasms. Patient is very sensitive to medications and she has been hestitant to take medication for radicular pain. However, the pain has gotten severe enough she is willing to try. 5/4/2020  presents to the office for sooner office visit. Patient had right SI, right greater trochanteric bursa and right piriformis injected on 3/11/2020. Patient did have an improvement in her low back pain until 4/27/2020. Patient called the nurse line stating that she had an increase in pain and inflammation. She knew it was too soon for a repeat injection was wondering if there is anything that could be done. Patient requested a Medrol Dosepak. Patient was given Medrol Dosepak over the phone and instructed to call if it did not improve. Patient stated that it improved for a short period of time however walking was making the pain worse. She states that she hurts across lower back and then down bilateral lower extremities straight back to the foot. Patient states that she has noticed that she trips a lot with her right foot. She states that she feels like she is leaning and that she cannot stand straight when ambulating. Patient has known instability in low back and had wanted to postpone low back surgery.   However patient is stating that she cannot take the pain any longer and she is wanting something to be done to help relieve the pain. Patient does appear to be in pain. Patient did become tearful during exam.  Patient is usually upbeat and . Patient states that pain is affecting her quality of life. Back Pain is a chronic problem. The current episode started more than 1 year ago. The problem occurs constantly. The problem has been rapidly worsening since onset. The pain is present in the gluteal, lumbar spine and sacro-iliac. The quality of the pain is described as aching, cramping, stabbing and shooting. Radiates to: BLE. The symptoms are aggravated by position, sitting, standing and twisting (Walking). Associated symptoms include leg pain, numbness, tingling and weakness. Pertinent negatives include no bladder incontinence or bowel incontinence. Risk factors include obesity. 12/2019  presents to the office for procedure follow-up and office visit. Patient had right SI and right trochanteric bursa injection with ultrasound and right piriformis on 10/23/2019. Patient sustained 50% relief for approximately 1 month before diminishing of pain. Patient stated that the injection targeted the area of pain.     Patient requested sooner appointment due to increase in low back pain. Patient had called provider at home regarding acute low back pain with numbness to right lower extremity that had started suddenly on Saturday. 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 Patient stated that she had done fine on Friday and then on Saturday she just had and a sudden acute onset of low back pain that was gripping. At the time pain medication was not controlling her pain. Patient stated that she started to have numbness to her right foot that is intermittent and that when she is having the numbness that she feels as if she may fall.   Patient rated her pain a 10 out of a 10 she described it is a stabbing in the low back. Patient states that sitting on the toilet will cause an increase in the radicular pain. Patient states that she has to lean on the shopping cart when she is ambulating to help with the low back pain. Patient states that cleaning such as sweeping mopping or doing anything side to side significantly increases low back pain. MRI was ordered of the lumbar spine. Patient does have instability that was on x-ray of the lumbar spine.   However at the time the x-ray was taken patient was not having any type of radicular or nerve symptoms.     10/3/19  presents with referral from [place] [id number] primary complaints of chronic low back pain.  Pain has been a gradual onset that started around 2006. Radha Pedersen is aggravated by lifting bending and walking.  Patient had imaging done by Dr. Steff Tucker in [facility] which indicated she had 2 bulging disks.  Per patient she was told that she had 2 dead disks and 2 bulging disks in the lumbar region. Verobelem Cherry was told that the hip pain was deferred from the bulging disks and this is progressively worsened over the past 12 years. [facility] states the pain is now constant and causes a limp.  Patient has been tried on several nerve medications doxepin caused patient to sleepwalk, gabapentin caused patient to lose time, and amitriptyline made her feel as if her blood was boiling.  Patient is tried massage therapy but this causes her to become more tense.  Aggravating factors include ambulation prolonged sitting prolonged standing.  Patient states that she does not hurt in the morning but it progresses as the day goes on.     Other areas of pain that will be addressed at a later time is bilateral shoulder pain, hip pain and left knee pain.     Of note: Patient has numerous medical allergies penicillin causes hives, erythromycin causes hives, doxepin causes sleepwalking, gabapentin causes loss of memory, amitriptyline makes her feel like her blood boiling, Daypro eyes swelled shut, Soma horrible headaches, Percocet nightmares, Demerol nausea, statins muscle spasms, meloxicam irritates stomach ulcers, citalopram causes memory loss, menthol and camphor causes hives    Employment: retired     Past Medical History  Past Medical History:   Diagnosis Date    Anxiety     Asthma     Back pain     Colon polyps     DDD (degenerative disc disease), cervical     Depression     Essential hypertension 10/19/2016    GERD (gastroesophageal reflux disease)     Hiatal hernia     HTN (hypertension)     Post-menopausal     Shoulder pain     Tobacco abuse 10/19/2016       Surgery History  Past Surgical History:   Procedure Laterality Date    CHOLECYSTECTOMY      GALLBLADDER SURGERY      HERNIA REPAIR      LAP BAND  08/28/2009    LUMBAR FUSION  2020    LUMBAR SPINE SURGERY N/A 8/5/2020    TLIF L3-4, L4-5 performed by Madeline Pickard DO at [address] [place] [address] History  family history includes Alzheimer's Disease in her mother; Parkinsonism in her mother; Stroke in her father; Substance Abuse in her brother.     Social History    Social History     Tobacco Use    Smoking status: Current Every Day Smoker     Packs/day: 1.00     Types: Cigarettes     Start date: 10/19/1988    Smokeless tobacco: Never Used   Substance Use Topics    Alcohol use: Not Currently     Comment: rare       Allergies  Daypro [oxaprozin], Doxepin, Gabapentin, Lyrica [pregabalin], Penicillins, Elavil [amitriptyline hcl], Erythromycin, Meloxicam, Percocet [oxycodone-acetaminophen], Soma [carisoprodol], Citalopram, Demerol hcl [meperidine], and Statins     Current Medications  Current Outpatient Medications   Medication Sig Dispense Refill    celecoxib (CELEBREX) 100 MG capsule Take 1 capsule by mouth 2 times daily 60 capsule 2    sertraline (ZOLOFT) 25 MG tablet TAKE 1 TABLET BY MOUTH EVERY DAY      [START ON 9/12/2021] HYDROcodone-acetaminophen (NORCO)  MG per tablet Take 1 tablet by mouth every 8 hours as needed for Pain for up to 30 days. Intended supply: 30 days 90 tablet 0    methocarbamol (ROBAXIN-750) 750 MG tablet Take 1 tablet by mouth 4 times daily as needed (muscle spasm) 120 tablet 2    aspirin 81 MG chewable tablet Take 81 mg by mouth daily      ALPRAZolam (XANAX) 1 MG tablet Take 1 mg by mouth nightly as needed for Sleep.  acetaminophen (TYLENOL) 325 MG tablet Take 325 mg by mouth every 6 hours as needed for Pain       diphenhydrAMINE HCl (BENADRYL PO) Take 25 mg by mouth daily as needed       busPIRone (BUSPAR) 5 MG tablet Take 5 mg by mouth 2 times daily as needed      clotrimazole (LOTRIMIN) 1 % cream Apply topically as needed Apply topically 2 times daily.  famotidine (PEPCID) 20 MG tablet Take 20 mg by mouth 2 times daily      mupirocin (BACTROBAN) 2 % ointment Apply topically as needed Apply topically 3 times daily.  hydrochlorothiazide (MICROZIDE) 12.5 MG capsule Take 25 mg by mouth daily       lisinopril (PRINIVIL;ZESTRIL) 20 MG tablet Take 10 mg by mouth daily       nystatin (MYCOSTATIN) 404754 UNIT/GM cream Apply topically as needed Apply topically 2 times daily. No current facility-administered medications for this encounter. Review of Systems:  Review of Systems   Constitutional: Positive for activity change. Gastrointestinal: Negative for bowel incontinence and constipation. Genitourinary: Negative for bladder incontinence. Musculoskeletal: Positive for arthralgias, back pain, gait problem, joint swelling and myalgias. Neurological: Positive for tingling (improved since surgery). Psychiatric/Behavioral: Positive for sleep disturbance. Negative for agitation, self-injury and suicidal ideas. The patient is not nervous/anxious.         14 point ROS negative besides that noted in HPI    Physical Exam:    Vitals:    09/09/21 1548   BP: 112/76   Pulse: 72   Temp: 97 °F (36.1 °C)   TempSrc: Temporal   SpO2: 94%   Weight: 211 lb (95.7 kg)   Height: 5' 5\" (1.651 m)       Body mass index is 35.11 kg/m². Physical Exam  Vitals and nursing note reviewed. Constitutional:       General: She is not in acute distress. Appearance: She is well-developed. HENT:      Head: Normocephalic. Right Ear: External ear normal.      Left Ear: External ear normal.      Nose: Nose normal.   Eyes:      Conjunctiva/sclera: Conjunctivae normal.      Pupils: Pupils are equal, round, and reactive to light. Neck:      Vascular: No JVD. Trachea: No tracheal deviation. Cardiovascular:      Rate and Rhythm: Normal rate. Pulmonary:      Effort: Pulmonary effort is normal.   Abdominal:      General: There is no distension. Tenderness: There is no abdominal tenderness. Musculoskeletal:      Right shoulder: Tenderness and bony tenderness present. Decreased range of motion. Left shoulder: Tenderness and bony tenderness present. Decreased range of motion. Lumbar back: Spasms and tenderness present. Decreased range of motion. Right knee: Bony tenderness present. Decreased range of motion. Tenderness present. Left knee: Bony tenderness present. Decreased range of motion. Tenderness present. Comments: + empty can, unable to reach behind back, grimaces with pain during assessment and rom   Skin:     General: Skin is warm and dry. Neurological:      Mental Status: She is alert and oriented to person, place, and time. Cranial Nerves: No cranial nerve deficit. Psychiatric:         Behavior: Behavior normal.         Thought Content:  Thought content normal.         Judgment: Judgment normal.       Labs:  Lab Results   Component Value Date     01/27/2021    K 4.0 01/27/2021     01/27/2021    CO2 28 01/27/2021    BUN 16 01/27/2021    CREATININE 0.6 01/27/2021    GLUCOSE 103 01/27/2021    CALCIUM 8.9 01/27/2021        Lab Results   Component Value Date    WBC 10.0 01/27/2021    HGB 11.2 (L) 01/27/2021 HCT 36.5 (L) 01/27/2021    MCV 97.3 01/27/2021     01/27/2021       Assessment:  Active Problems:    Tibial fracture    Chronic bilateral low back pain with bilateral sciatica    SI (sacroiliac) joint dysfunction    Piriformis syndrome of right side    Chronic pain of both shoulders    Trochanteric bursitis of right hip    Anterolisthesis    Spondylolisthesis at L3-L4 level    Pain medication agreement    Chronic pain of left knee    S/p tibial fracture  Resolved Problems:    * No resolved hospital problems. *      Plan:  Arthritis of knee  Continue with refill sent at the time of the appointment - celecoxib (CELEBREX) 100 MG capsule; Take 1 capsule by mouth 2 times daily  Dispense: 60 capsule; Refill: 2    Continue with refill sent  Robaxin 750 mg 4 times a day as needed for myofascial pain    Continue refill sent at the time of the appointment with fill date of 9/12/2021 Norco 10 mg every  8 hours prn # 75 with refill sent on 6/4/2021    Schedule right knee with Toradol US since patient had recent fracture do not want to use steroid at this time. Repeat left knee with Bushra Chavez with ultrasound as patient has had good results from previous injections    Schedule right SI joint injection with ultrasound with NP as patient had positive provocative measures. [x] Follow up    [] 4 weeks   [x] 6-8 weeks   [] 10-12 weeks   [] 3 months  [] Post procedure to evaluate effectiveness of treatment  [] To evaluate medications changes made at office visit. [] To review diagnostics ordered at last visit. [] To evaluate response to therapy    [x] For management of controlled substance  [x] Random UDS as indicated by ORT score or if indicated by abberent behaviors    Discussion: Discussed exam findings and plan of care with patient. Patient agreed with POC and questions were asked and answered.      Activity: Discussed exercise as beneficial to pain reduction, encouraged daily stretching with a focus on torso strengthening. Goal:  Pain Management Goals of Therapy:   [] Resolution in pain  [x] Decrease in pain level  [x] Improvement in ADL's  [x] Increase in activities with less pain  [] Decrease in medication      Controlled substance monitoring:    [x] Discussed medication side effects, risk of tolerance and/or dependence, and/or alternative treatment  [] Discussed the detrimental effects of long term narcotic use in younger patients especially women of childbearing years. [x] No signs and symptoms of potential drug abuse or diversion were identified  [] Signs of potential drug abuse or diversion were identified   [] ORT Score   [] UDS non-compliant   [] See Note  [] Random urine drug screen sent today  [x] Random urine drug screen not completed today   [] Deferred New Patient  [x] Compliant  Takes CBD oil  [] Not Compliant see note  [] Medication agreement with provider signed today  [x] Medication agreement with provider on file under media   [x] Medication regimen effective and continued   [] New patient continuing current medication while developing POC   [] On going assessment and evaluation of medication regimen  [] Medication regimen not effective see plan for changes  [x] Ro Rear reviewed & on file under media       CC:  Lisbeth Merritt, DAYSI - CNP, 9/9/2021 at 4:44 PM    EMR dragon/transcription disclaimer: Much of this encounter note is electronic transcription/translation of spoken language to printed tach. Electronic translation of spoken language may be erroneous, or at times, nonsensical words or phrases may be inadvertently transcribed.  Although, I have reviewed the note for such errors, some may still exist.

## 2021-09-19 DIAGNOSIS — M43.10 ANTEROLISTHESIS: ICD-10-CM

## 2021-09-19 DIAGNOSIS — G57.01 PIRIFORMIS SYNDROME OF RIGHT SIDE: ICD-10-CM

## 2021-09-19 RX ORDER — METHOCARBAMOL 750 MG/1
750 TABLET, FILM COATED ORAL 4 TIMES DAILY PRN
Qty: 120 TABLET | Refills: 5 | Status: SHIPPED | OUTPATIENT
Start: 2021-09-19 | End: 2022-01-27 | Stop reason: SDUPTHER

## 2021-09-22 ENCOUNTER — HOSPITAL ENCOUNTER (OUTPATIENT)
Dept: PAIN MANAGEMENT | Age: 56
Discharge: HOME | End: 2021-09-22
Payer: COMMERCIAL

## 2021-09-22 VITALS
TEMPERATURE: 96.9 F | HEART RATE: 71 BPM | DIASTOLIC BLOOD PRESSURE: 73 MMHG | SYSTOLIC BLOOD PRESSURE: 132 MMHG | OXYGEN SATURATION: 92 % | RESPIRATION RATE: 20 BRPM

## 2021-09-22 PROCEDURE — 20611 DRAIN/INJ JOINT/BURSA W/US: CPT

## 2021-09-22 PROCEDURE — 76942 ECHO GUIDE FOR BIOPSY: CPT | Performed by: NURSE PRACTITIONER

## 2021-09-22 PROCEDURE — 6360000002 HC RX W HCPCS

## 2021-09-22 PROCEDURE — 2500000003 HC RX 250 WO HCPCS

## 2021-09-22 PROCEDURE — 20552 NJX 1/MLT TRIGGER POINT 1/2: CPT | Performed by: NURSE PRACTITIONER

## 2021-09-22 RX ORDER — TRIAMCINOLONE ACETONIDE 40 MG/ML
40 INJECTION, SUSPENSION INTRA-ARTICULAR; INTRAMUSCULAR ONCE
Status: DISCONTINUED | OUTPATIENT
Start: 2021-09-22 | End: 2021-09-24 | Stop reason: HOSPADM

## 2021-09-22 RX ORDER — LIDOCAINE HYDROCHLORIDE 10 MG/ML
2 INJECTION, SOLUTION EPIDURAL; INFILTRATION; INTRACAUDAL; PERINEURAL ONCE
Status: DISCONTINUED | OUTPATIENT
Start: 2021-09-22 | End: 2021-09-24 | Stop reason: HOSPADM

## 2021-09-22 RX ORDER — BUPIVACAINE HYDROCHLORIDE 5 MG/ML
2 INJECTION, SOLUTION EPIDURAL; INTRACAUDAL ONCE
Status: DISCONTINUED | OUTPATIENT
Start: 2021-09-22 | End: 2021-09-24 | Stop reason: HOSPADM

## 2021-09-22 RX ORDER — ONDANSETRON 4 MG/1
4 TABLET, FILM COATED ORAL
COMMUNITY

## 2021-09-22 RX ORDER — OMEPRAZOLE 40 MG/1
40 CAPSULE, DELAYED RELEASE ORAL DAILY
COMMUNITY

## 2021-09-22 NOTE — PROCEDURES
[facility] Physical & Pain Medicine    Patient Name: Deandra Juares    : 1965    Age: 60 y.o. Sex: female    Date: 2021    Pre-op Diagnosis: right  Sacroiliac Joint(s) Dysfunction/ Sacroiliitis    Post-op Diagnosis: right  Sacroiliac Joint(s) Dysfunction/ Sacroliliits    Procedure: Ultrasound Guided Injection of  right Sacroiliac Joint(s)     Performing Procedure:  KARINE Hernandez - BC, JUAN C    Patient Vitals for the past 24 hrs:   BP Temp Temp src Pulse Resp SpO2   21 1115 132/73 96.9 °F (36.1 °C) Temporal 71 20 92 %       right  Sacroiliac Joint(s)     Description of Procedure:    After voluntary, informed and signed consent obtained the patient was placed in a prone position. Appropriate time out was obtained per policy. The Sacroiliac Joint(s) was palpated for area of maximal tenderness. The area was prepped in an aseptic fashion with CHG swab. The ultrasound transducer was used to confirm the appropriate location. The skin was sprayed with Gebauer's Solution. Under aseptic technique and direct ultrasound visualization a 22 gauge 3 inch spinal needle was introduced into the Sacroiliac Joint(s). After a negative aspiration, a solution of 2 ml of 0.5% Marcaine Plain and 2 ml of 1% Lidocaine Plain and 1 ml of Kenalog (40 mg/ml) was injected into the Sacroiliac Joint(s). The needle was withdrawn and a sterile dressing applied. If this was a bilateral procedure, the same steps were followed on the opposite side. Discharge: The patient tolerated the procedure well. There were no complications during the procedure and the patient was discharged home with discharge instructions. The patient has been instructed to contact the office should there be any complications or questions to arise between today and their next appointment. Plan:    The patient will follow up in the office in approximately 6 weeks.      DAYSI Melchor CNP, 2021 at 2:42 PM

## 2021-09-28 ENCOUNTER — TELEPHONE (OUTPATIENT)
Dept: PAIN MANAGEMENT | Age: 56
End: 2021-09-28

## 2021-09-28 NOTE — TELEPHONE ENCOUNTER
Spoke with pt concerning her injection she had on 09/22. Pt states she is doing good. States she is 50% better and is a 2/10 today on her pain score. No further questions at this time.

## 2021-10-13 DIAGNOSIS — G89.29 CHRONIC PAIN OF BOTH SHOULDERS: ICD-10-CM

## 2021-10-13 DIAGNOSIS — M43.16 SPONDYLOLISTHESIS AT L3-L4 LEVEL: ICD-10-CM

## 2021-10-13 DIAGNOSIS — M25.512 CHRONIC PAIN OF BOTH SHOULDERS: ICD-10-CM

## 2021-10-13 DIAGNOSIS — M25.511 CHRONIC PAIN OF BOTH SHOULDERS: ICD-10-CM

## 2021-10-15 RX ORDER — HYDROCODONE BITARTRATE AND ACETAMINOPHEN 10; 325 MG/1; MG/1
1 TABLET ORAL EVERY 8 HOURS PRN
Qty: 90 TABLET | Refills: 0 | Status: SHIPPED | OUTPATIENT
Start: 2021-10-15 | End: 2021-11-15 | Stop reason: SDUPTHER

## 2021-11-15 DIAGNOSIS — G89.29 CHRONIC PAIN OF BOTH SHOULDERS: ICD-10-CM

## 2021-11-15 DIAGNOSIS — M25.512 CHRONIC PAIN OF BOTH SHOULDERS: ICD-10-CM

## 2021-11-15 DIAGNOSIS — M25.511 CHRONIC PAIN OF BOTH SHOULDERS: ICD-10-CM

## 2021-11-15 DIAGNOSIS — M43.16 SPONDYLOLISTHESIS AT L3-L4 LEVEL: ICD-10-CM

## 2021-11-16 RX ORDER — HYDROCODONE BITARTRATE AND ACETAMINOPHEN 10; 325 MG/1; MG/1
1 TABLET ORAL EVERY 8 HOURS PRN
Qty: 90 TABLET | Refills: 0 | Status: SHIPPED | OUTPATIENT
Start: 2021-11-16 | End: 2021-12-15 | Stop reason: SDUPTHER

## 2021-11-23 ENCOUNTER — HOSPITAL ENCOUNTER (OUTPATIENT)
Dept: GENERAL RADIOLOGY | Age: 56
Discharge: HOME | End: 2021-11-23
Payer: COMMERCIAL

## 2021-11-23 ENCOUNTER — OFFICE VISIT (OUTPATIENT)
Dept: NEUROSURGERY | Age: 56
End: 2021-11-23
Payer: COMMERCIAL

## 2021-11-23 VITALS
HEART RATE: 75 BPM | BODY MASS INDEX: 34.11 KG/M2 | DIASTOLIC BLOOD PRESSURE: 77 MMHG | OXYGEN SATURATION: 97 % | WEIGHT: 205 LBS | SYSTOLIC BLOOD PRESSURE: 129 MMHG

## 2021-11-23 DIAGNOSIS — Z98.1 S/P LUMBAR FUSION: Primary | ICD-10-CM

## 2021-11-23 DIAGNOSIS — Z98.1 S/P LUMBAR FUSION: ICD-10-CM

## 2021-11-23 PROCEDURE — 72100 X-RAY EXAM L-S SPINE 2/3 VWS: CPT

## 2021-11-23 PROCEDURE — 99213 OFFICE O/P EST LOW 20 MIN: CPT | Performed by: NEUROLOGICAL SURGERY

## 2021-11-23 RX ORDER — ONDANSETRON 4 MG/1
4 TABLET, ORALLY DISINTEGRATING ORAL PRN
COMMUNITY
Start: 2021-11-12 | End: 2022-06-28

## 2021-11-23 ASSESSMENT — ENCOUNTER SYMPTOMS
RESPIRATORY NEGATIVE: 1
EYES NEGATIVE: 1
GASTROINTESTINAL NEGATIVE: 1

## 2021-11-23 NOTE — PROGRESS NOTES
18 [facility] Office Visit      Chief Complaint   Patient presents with    Follow-up       HISTORY OF PRESENT ILLNESS:      Charlie Kirby is a 60 y.o. female who underwent a TLIF of L3-4, L4-5 for spondylolisthesis of L3-4 on 8/05/2020 and now she is over 1 year out from her surgery. Prior to surgery she complained of low back pain and bilateral lower extremity pain that radiated into the buttocks and posterior thighs. She reported 60% was back pain and 40% was BLE pain. Today she states that she no longer has any back pain, no true radicular pain. She will have some back ache if over doing the activity. She is doing well. She states she needs right knee surgery, however, she has to stop smoking for 2 months. She has had a RIGHT SI joint injection per Shin TAVAREZ and is doing a little better from that. Past Medical History:   Diagnosis Date    Anxiety     Asthma     Back pain     Colon polyps     DDD (degenerative disc disease), cervical     Depression     Essential hypertension 10/19/2016    GERD (gastroesophageal reflux disease)     Hiatal hernia     HTN (hypertension)     Post-menopausal     Shoulder pain     Tobacco abuse 10/19/2016       Past Surgical History:   Procedure Laterality Date    CHOLECYSTECTOMY      GALLBLADDER SURGERY      HERNIA REPAIR      LAP BAND  08/28/2009    LUMBAR FUSION  2020    LUMBAR SPINE SURGERY N/A 8/5/2020    TLIF L3-4, L4-5 performed by Arturo De La Garza DO at [facility] OR        Medications    Current Outpatient Medications:     ondansetron (ZOFRAN-ODT) 4 MG disintegrating tablet, Take 4 mg by mouth as needed, Disp: , Rfl:     HYDROcodone-acetaminophen (NORCO)  MG per tablet, Take 1 tablet by mouth every 8 hours as needed for Pain for up to 30 days. , Disp: 90 tablet, Rfl: 0    ondansetron (ZOFRAN) 4 MG tablet, Take 4 mg by mouth, Disp: , Rfl:     omeprazole (PRILOSEC) 40 MG delayed release capsule, Take 40 mg by mouth daily, Disp: , Rfl:     methocarbamol (ROBAXIN-750) 750 MG tablet, Take 1 tablet by mouth 4 times daily as needed (muscle spasm), Disp: 120 tablet, Rfl: 5    sertraline (ZOLOFT) 25 MG tablet, TAKE 1 TABLET BY MOUTH EVERY DAY, Disp: , Rfl:     aspirin 81 MG chewable tablet, Take 81 mg by mouth daily, Disp: , Rfl:     ALPRAZolam (XANAX) 1 MG tablet, Take 1 mg by mouth nightly as needed for Sleep., Disp: , Rfl:     acetaminophen (TYLENOL) 325 MG tablet, Take 325 mg by mouth every 6 hours as needed for Pain , Disp: , Rfl:     diphenhydrAMINE HCl (BENADRYL PO), Take 25 mg by mouth daily as needed , Disp: , Rfl:     clotrimazole (LOTRIMIN) 1 % cream, Apply topically as needed Apply topically 2 times daily. , Disp: , Rfl:     famotidine (PEPCID) 20 MG tablet, Take 20 mg by mouth 2 times daily, Disp: , Rfl:     mupirocin (BACTROBAN) 2 % ointment, Apply topically as needed Apply topically 3 times daily. , Disp: , Rfl:     hydrochlorothiazide (MICROZIDE) 12.5 MG capsule, Take 25 mg by mouth daily , Disp: , Rfl:     lisinopril (PRINIVIL;ZESTRIL) 20 MG tablet, Take 10 mg by mouth daily , Disp: , Rfl:     nystatin (MYCOSTATIN) 857798 UNIT/GM cream, Apply topically as needed Apply topically 2 times daily.  , Disp: , Rfl:   Daypro [oxaprozin], Doxepin, Gabapentin, Lyrica [pregabalin], Penicillins, Elavil [amitriptyline hcl], Erythromycin, Meloxicam, Percocet [oxycodone-acetaminophen], Soma [carisoprodol], Citalopram, Demerol hcl [meperidine], and Statins    Social History  Social History     Tobacco Use   Smoking Status Current Every Day Smoker    Packs/day: 1.00    Types: Cigarettes    Start date: 10/19/1988   Smokeless Tobacco Never Used     Social History     Substance and Sexual Activity   Alcohol Use Not Currently    Comment: rare         Family History   Problem Relation Age of Onset   Briseida Malave Parkinsonism Mother     Alzheimer's Disease Mother     Stroke Father     Substance Abuse Brother        Review of Systems:  Constitutional: Negative. HENT: Negative. Eyes: Negative. Respiratory: Negative. Cardiovascular: Negative. Gastrointestinal: Negative. Genitourinary: Negative. Musculoskeletal: Negative. Skin: Negative. Neurological: Negative. Endo/Heme/Allergies: Negative. Psychiatric/Behavioral: Negative. PHYSICAL EXAM:  Vitals:    11/23/21 1139   BP: 129/77   Pulse: 75   SpO2: 97%     Constitutional: The patient appears well-developed andwell-nourished. Eyes - conjunctiva normal.  Conjugate gaze  Ear, nose, throat -No scars, masses, or lesions over external nose or ears, no atrophy of tongue  Neck-symmetric, no masses noted, no jugular vein distension  Respiration- chest wall appears symmetric, goodexpansion, normal effort without use of accessory muscles  Musculoskeletal - no significant wasting of muscles noted, no bony deformities, gait no gross ataxia  Extremities-no clubbing, cyanosis or edema  Skin- warm, dry, and intact. No rash, erythema, or pallor. Psychiatric - mood, affect, and behavior appear normal.     Neurologic Examination  Awake, Alert andoriented x 3  Normal speech pattern, following commands  Motor 5/5 all extremities  No deficits to light touch           Wound:   Well healed       DATA and IMAGING:    Lab Results   Component Value Date    WBC 10.0 01/27/2021    HGB 11.2 (L) 01/27/2021    HCT 36.5 (L) 01/27/2021    MCV 97.3 01/27/2021     01/27/2021     Lab Results   Component Value Date     01/27/2021    K 4.0 01/27/2021     01/27/2021    CO2 28 01/27/2021    BUN 16 01/27/2021    CREATININE 0.6 01/27/2021    GLUCOSE 103 01/27/2021    CALCIUM 8.9 01/27/2021    PROT 7.1 01/26/2021    LABALBU 4.3 01/26/2021    BILITOT <0.2 01/26/2021    ALKPHOS 106 (H) 01/26/2021    AST 9 01/26/2021    ALT 9 01/26/2021    LABGLOM >60 01/27/2021    GFRAA >59 01/27/2021     Lab Results   Component Value Date    INR 0.96 01/26/2021    INR 0.88 07/31/2020    PROTIME 12.7 01/26/2021    PROTIME 11.8 (L) 07/31/2020    No results found. EXAMINATION: XR LUMBAR SPINE (2-3 VIEWS) 11/23/2021 12:55 PM   HISTORY: Follow-up posterior lumbar fusion   Prior exam is May 10, 2021   AP and lateral views of the lumbar spine are obtained. Lumbar vertebra normally aligned. The lumbar vertebra are normal in   height. Screws are present in the pedicles at L3-L4 and L5. Interbody disc   space devices are present at the L3-4 and L4-5 disc space levels. Posterior stabilization rods are present. Evidence of a surgical lap band is noted in the left upper quadrant.       Impression   Stable instrumented fusion L3, L4 and L5   Signed by Dr Scar Calhoun       I have personally reviewed the images and my interpretation is:  Hardware in good position, bony formation noted at both levels, completely fused at L4-5        ASSESSMENT:   Inder Sierra is a 62 y.o. female who underwent a TLIF of L3-4, L4-5 for spondylolisthesis of L3-4 on 8/05/2020. PLAN:  -Mrs. Usha Loza is doing very well from a lumbar spine standpoint. She has no more severe back pain or radicular pains. Does have some orthopedic problems in which she hopes to have addressed soon.    -Follow up as needed from our standpoint           ICD-10-CM    1.  S/P lumbar fusion  Z98.1         Sarah Barron DO

## 2021-12-15 DIAGNOSIS — M17.10 ARTHRITIS OF KNEE: ICD-10-CM

## 2021-12-15 DIAGNOSIS — M25.511 CHRONIC PAIN OF BOTH SHOULDERS: ICD-10-CM

## 2021-12-15 DIAGNOSIS — M43.16 SPONDYLOLISTHESIS AT L3-L4 LEVEL: ICD-10-CM

## 2021-12-15 DIAGNOSIS — M25.512 CHRONIC PAIN OF BOTH SHOULDERS: ICD-10-CM

## 2021-12-15 DIAGNOSIS — G89.29 CHRONIC PAIN OF BOTH SHOULDERS: ICD-10-CM

## 2021-12-19 RX ORDER — HYDROCODONE BITARTRATE AND ACETAMINOPHEN 10; 325 MG/1; MG/1
1 TABLET ORAL EVERY 8 HOURS PRN
Qty: 90 TABLET | Refills: 0 | Status: SHIPPED | OUTPATIENT
Start: 2021-12-19 | End: 2022-02-22 | Stop reason: SDUPTHER

## 2021-12-19 RX ORDER — CELECOXIB 100 MG/1
100 CAPSULE ORAL 2 TIMES DAILY
Qty: 60 CAPSULE | Refills: 2 | Status: SHIPPED | OUTPATIENT
Start: 2021-12-19 | End: 2022-03-23 | Stop reason: SDUPTHER

## 2021-12-30 ENCOUNTER — HOME HEALTH ADMISSION (OUTPATIENT)
Dept: HOME HEALTH SERVICES | Facility: HOME HEALTHCARE | Age: 56
End: 2021-12-30

## 2021-12-30 ENCOUNTER — TRANSCRIBE ORDERS (OUTPATIENT)
Dept: HOME HEALTH SERVICES | Facility: HOME HEALTHCARE | Age: 56
End: 2021-12-30

## 2021-12-30 DIAGNOSIS — Z47.1 AFTERCARE FOLLOWING RIGHT KNEE JOINT REPLACEMENT SURGERY: Primary | ICD-10-CM

## 2021-12-30 DIAGNOSIS — Z96.651 AFTERCARE FOLLOWING RIGHT KNEE JOINT REPLACEMENT SURGERY: Primary | ICD-10-CM

## 2022-01-03 ENCOUNTER — HOME CARE VISIT (OUTPATIENT)
Dept: HOME HEALTH SERVICES | Facility: CLINIC | Age: 57
End: 2022-01-03

## 2022-01-03 VITALS
DIASTOLIC BLOOD PRESSURE: 82 MMHG | HEART RATE: 68 BPM | TEMPERATURE: 97.4 F | BODY MASS INDEX: 32.82 KG/M2 | WEIGHT: 197 LBS | HEIGHT: 65 IN | SYSTOLIC BLOOD PRESSURE: 138 MMHG | OXYGEN SATURATION: 95 %

## 2022-01-03 PROCEDURE — G0151 HHCP-SERV OF PT,EA 15 MIN: HCPCS

## 2022-01-03 NOTE — HOME HEALTH
Pt states [place] [facility] [place] 12-96 . 22deg extension lag w/SLR

## 2022-01-04 ENCOUNTER — TELEPHONE (OUTPATIENT)
Dept: PAIN MANAGEMENT | Age: 57
End: 2022-01-04

## 2022-01-04 NOTE — TELEPHONE ENCOUNTER
Patient called  and was transferred to nurse line. Patient stated that she had surgery last week for knee replacement by Dr. Gloria Trivedi. She reports that she is to follow up on this coming Friday with ortho. She states that she is being seen by physical therapy but is having a lot of pain. She is receiving pain medication from Dr. Gloria Trivedi but states that she is not going to have enough until she is seen in the office. She said that she contacted ortho but they instructed that she would have to get by with the amount that she was given. She asked if Jose G Anayeli would want to prescribe medication. I let her know that as long as she is being seen by the surgeon, she would need to have her post op pain managed by them. She said that she is holding Mishel's medication at this time, and asked that if she runs out of the other she can take some of Mishel's medication. I let her know that she cannot take both of the meds. I suggested that she cut back on the amount she is taking in order to get her through to the appointment. She states that she is prescribed oxycodone 7.5 mg two tablets every 6 hours. She says that she is taking the medication as prescribed but they did not give her enough to last.  She said that she has tried to cut back to one tablet, but it does not help. I explained that she should address any increased or different pain with the surgeon so that it can be evaluated.

## 2022-01-06 ENCOUNTER — HOME CARE VISIT (OUTPATIENT)
Dept: HOME HEALTH SERVICES | Facility: CLINIC | Age: 57
End: 2022-01-06

## 2022-01-06 VITALS
RESPIRATION RATE: 18 BRPM | OXYGEN SATURATION: 98 % | DIASTOLIC BLOOD PRESSURE: 80 MMHG | SYSTOLIC BLOOD PRESSURE: 138 MMHG | HEART RATE: 84 BPM | TEMPERATURE: 97.1 F

## 2022-01-06 PROCEDURE — G0157 HHC PT ASSISTANT EA 15: HCPCS

## 2022-01-06 NOTE — HOME HEALTH
Pt states no falls or changes to medication or insurance. Plan for next visit is to educate pt on bed mobility, transfer and gt training, and pain management compliance.

## 2022-01-10 ENCOUNTER — HOME CARE VISIT (OUTPATIENT)
Dept: HOME HEALTH SERVICES | Facility: CLINIC | Age: 57
End: 2022-01-10

## 2022-01-10 VITALS
DIASTOLIC BLOOD PRESSURE: 78 MMHG | TEMPERATURE: 97.4 F | HEART RATE: 76 BPM | SYSTOLIC BLOOD PRESSURE: 148 MMHG | RESPIRATION RATE: 18 BRPM | OXYGEN SATURATION: 97 %

## 2022-01-10 PROCEDURE — G0157 HHC PT ASSISTANT EA 15: HCPCS

## 2022-01-10 NOTE — HOME HEALTH
Pt states no falls or changes to medication or insurance. Next visit to focus on gt mechanics, bal, and progressive exercises.

## 2022-01-12 ENCOUNTER — HOME CARE VISIT (OUTPATIENT)
Dept: HOME HEALTH SERVICES | Facility: CLINIC | Age: 57
End: 2022-01-12

## 2022-01-12 VITALS
DIASTOLIC BLOOD PRESSURE: 78 MMHG | OXYGEN SATURATION: 99 % | RESPIRATION RATE: 18 BRPM | TEMPERATURE: 98.2 F | SYSTOLIC BLOOD PRESSURE: 124 MMHG | HEART RATE: 68 BPM

## 2022-01-12 PROCEDURE — G0157 HHC PT ASSISTANT EA 15: HCPCS

## 2022-01-12 NOTE — HOME HEALTH
Pt states no falls or changes in medication or insurance. Plan for next visit is to educate and monitor advanced HEP, gt w/STC, and balance.

## 2022-01-17 ENCOUNTER — HOME CARE VISIT (OUTPATIENT)
Dept: HOME HEALTH SERVICES | Facility: CLINIC | Age: 57
End: 2022-01-17

## 2022-01-17 VITALS
DIASTOLIC BLOOD PRESSURE: 78 MMHG | TEMPERATURE: 97.1 F | OXYGEN SATURATION: 98 % | HEART RATE: 78 BPM | SYSTOLIC BLOOD PRESSURE: 130 MMHG | RESPIRATION RATE: 18 BRPM

## 2022-01-17 PROCEDURE — G0157 HHC PT ASSISTANT EA 15: HCPCS

## 2022-01-17 NOTE — HOME HEALTH
Pt states no falls or changes to medication or insurance. Pt states that she needs another week of therapy due to inability to drive and her spouse is unable to drive due to health issues. Plan for next visit is to progress amb w/STC and ed in HEP and ROM techniques.

## 2022-01-20 ENCOUNTER — HOME CARE VISIT (OUTPATIENT)
Dept: HOME HEALTH SERVICES | Facility: CLINIC | Age: 57
End: 2022-01-20

## 2022-01-20 VITALS
OXYGEN SATURATION: 98 % | DIASTOLIC BLOOD PRESSURE: 70 MMHG | RESPIRATION RATE: 16 BRPM | SYSTOLIC BLOOD PRESSURE: 114 MMHG | TEMPERATURE: 97.3 F | HEART RATE: 71 BPM

## 2022-01-20 PROCEDURE — G0151 HHCP-SERV OF PT,EA 15 MIN: HCPCS

## 2022-01-26 DIAGNOSIS — M43.10 ANTEROLISTHESIS: ICD-10-CM

## 2022-01-26 DIAGNOSIS — G57.01 PIRIFORMIS SYNDROME OF RIGHT SIDE: ICD-10-CM

## 2022-01-27 RX ORDER — METHOCARBAMOL 750 MG/1
750 TABLET, FILM COATED ORAL 4 TIMES DAILY PRN
Qty: 120 TABLET | Refills: 5 | Status: SHIPPED | OUTPATIENT
Start: 2022-01-27 | End: 2022-03-23 | Stop reason: SDUPTHER

## 2022-02-22 ENCOUNTER — TELEPHONE (OUTPATIENT)
Dept: PAIN MANAGEMENT | Age: 57
End: 2022-02-22

## 2022-02-22 DIAGNOSIS — M25.512 CHRONIC PAIN OF BOTH SHOULDERS: ICD-10-CM

## 2022-02-22 DIAGNOSIS — M43.16 SPONDYLOLISTHESIS AT L3-L4 LEVEL: ICD-10-CM

## 2022-02-22 DIAGNOSIS — M25.511 CHRONIC PAIN OF BOTH SHOULDERS: ICD-10-CM

## 2022-02-22 DIAGNOSIS — G89.29 CHRONIC PAIN OF BOTH SHOULDERS: ICD-10-CM

## 2022-02-22 RX ORDER — HYDROCODONE BITARTRATE AND ACETAMINOPHEN 10; 325 MG/1; MG/1
1 TABLET ORAL EVERY 8 HOURS PRN
Qty: 90 TABLET | Refills: 0 | Status: SHIPPED | OUTPATIENT
Start: 2022-02-22 | End: 2022-03-23 | Stop reason: SDUPTHER

## 2022-02-22 NOTE — TELEPHONE ENCOUNTER
Requested Prescriptions     Signed Prescriptions Disp Refills    HYDROcodone-acetaminophen (NORCO)  MG per tablet 90 tablet 0     Sig: Take 1 tablet by mouth every 8 hours as needed for Pain for up to 30 days.      Authorizing Provider: Rocco Villafuerte

## 2022-03-15 DIAGNOSIS — M17.10 ARTHRITIS OF KNEE: ICD-10-CM

## 2022-03-15 RX ORDER — CELECOXIB 100 MG/1
CAPSULE ORAL
Qty: 60 CAPSULE | Refills: 2 | OUTPATIENT
Start: 2022-03-15

## 2022-03-21 ENCOUNTER — HOSPITAL ENCOUNTER (OUTPATIENT)
Dept: PAIN MANAGEMENT | Age: 57
Discharge: HOME | End: 2022-03-21
Payer: COMMERCIAL

## 2022-03-21 VITALS
OXYGEN SATURATION: 96 % | RESPIRATION RATE: 18 BRPM | DIASTOLIC BLOOD PRESSURE: 78 MMHG | SYSTOLIC BLOOD PRESSURE: 130 MMHG | TEMPERATURE: 98 F | HEART RATE: 62 BPM | WEIGHT: 197 LBS | BODY MASS INDEX: 32.82 KG/M2 | HEIGHT: 65 IN

## 2022-03-21 PROCEDURE — 99214 OFFICE O/P EST MOD 30 MIN: CPT

## 2022-03-21 PROCEDURE — 99213 OFFICE O/P EST LOW 20 MIN: CPT | Performed by: NURSE PRACTITIONER

## 2022-03-21 ASSESSMENT — PAIN DESCRIPTION - DIRECTION: RADIATING_TOWARDS: RADIATES DOWN LEGS

## 2022-03-21 ASSESSMENT — PAIN DESCRIPTION - ORIENTATION: ORIENTATION: LOWER

## 2022-03-21 ASSESSMENT — PAIN SCALES - GENERAL: PAINLEVEL_OUTOF10: 6

## 2022-03-21 ASSESSMENT — PAIN DESCRIPTION - LOCATION: LOCATION: BACK

## 2022-03-21 ASSESSMENT — PAIN DESCRIPTION - PAIN TYPE: TYPE: CHRONIC PAIN

## 2022-03-21 ASSESSMENT — ENCOUNTER SYMPTOMS
CONSTIPATION: 0
BACK PAIN: 1
BOWEL INCONTINENCE: 0

## 2022-03-21 NOTE — PROGRESS NOTES
Clinic Documentation      Education Provided:  [x] Review of Jennifer Youngblood  [] Agreement Review  [x] PEG Score Calculated [x] PHQ Score Calculated [x] ORT Score Calculated    [] Compliance Issues Discussed [] Cognitive Behavior Needs [x] Exercise [] Review of Test [] Financial Issues  [x] Tobacco/Alcohol Use Reviewed [x] Teaching [] New Patient [] Picture Obtained    Physician Plan:  [] Outgoing Referral  [] Pharmacy Consult  [] Test Ordered [] Prescription Ordered/Changed   [] Obtained Test Results / Consult Notes        Complete if patient is withholding blood thinner for procedure     Blood Thinner Patient is currently taking:      [] Plavix (Hold for 7 days)  [] Aspirin (Hold for 5 days)     [] Pletal (Hold for 2 days)  [] Pradaxa (Hold for 3 days)    [] Effient (Hold for 7 days)  [] Xarelto (Hold for 2 days)    [] Eliquis (Hold for 2 days)  [] Brilinta (Hold for 7 days)    [] Coumadin (Hold for 5 days) - (INR needs to be drawn the day prior to procedure- INR < 2.0)    [] Aggrenox (Hold for 7 days)        [] Patient will stop medication on their own.    [] Blood Thinner Form Faxed for approval to hold.    Provider form faxed to:    Assessment Completed by:  Electronically signed by Radha Connors RN on 3/21/2022 at 3:36 PM

## 2022-03-21 NOTE — PROGRESS NOTES
[facility]  Office Note    Patient Name: Kayleigh Murray    MR #: [id number]    Account #: [de-identified]    : 1965    Age: 60 y.o. Sex: female    Date: 2021    PCP: Micheline Alcocer    Chief Complaint:   Chief Complaint   Patient presents with    Lower Back Pain       History of Present Illness:     Kayleigh Murray is a 60 y.o. female who presents for 3 month follow up. Patient states that she has been going well. Patient had Right Knee replaced. Patient's left knee has been a little more painful since her recovery. Patient states that she low back pain has been doing well. She states that her shoulder pain has improved. Shoulder Pain  This is a chronic problem. The current episode started more than 1 year ago. The problem occurs constantly. The problem has been waxing and waning. Associated symptoms include arthralgias, joint swelling and myalgias. The symptoms are aggravated by bending (lifting, reaching, carrying, house hold chorse). Knee Pain  This is a chronic problem. The current episode started more than 1 year ago. The problem occurs constantly. The problem has been waxing and waning. Associated symptoms include arthralgias, joint swelling and myalgias. The symptoms are aggravated by walking and bending. Back Pain  This is a chronic problem. The current episode started more than 1 year ago. The problem occurs constantly. The problem has been waxing and waning since onset. The pain is present in the gluteal, lumbar spine and sacro-iliac. The quality of the pain is described as aching, cramping, shooting and stabbing. Radiates to: BLE. The symptoms are aggravated by sitting, standing, position and twisting (Walking). Associated symptoms include tingling (improved since surgery). Pertinent negatives include no bladder incontinence, bowel incontinence or leg pain (improved since surgery).      Screening Tools:    PEG Score: 6.6     Last PEG Score: 7     Annual ORT Score: 0     Annual PHQ Score: 11    Current Pain Assessment  Pain Assessment  Pain Assessment: 0-10  Pain Level: 6  Pain Type: Chronic pain  Pain Location: Back  Pain Orientation: Lower  Pain Radiating Towards: Radiates down legs    Past Visit HPI:  6/14/2021  Left Knee xray 1/4/2021 Degenerative arthritis. Left Shoulder xray 1/4/2021 unremarkable    Right Shoulder xray 1/4/2021- Degenerative arthritis in [facility] joint irregularity of greater tuberosity with possible chronic degeneration ? Osteophytes (bone spurs) MRI is needed. The irregularities could indicate a chronic tear in Rotator Cuff. MRI of Right Shoulder 5/25/2021 - Full thickness nearly full width tear of supraspinatus tendon with mild retractation Partial thickness tear of infraspinatus tendon Retracted proximal tear of biceps tendon with tear in the distal supraspinatus muscle. Chronic arthritic degeneration and changes with suspected impingement of supraspinatus muscle. 1/2021  presents for 3 month follow up. Patient is being seen along with her spouse. Patient had TLIF of L3-4, L4-5 for spondylolisthesis of L3-4 on 8/05/2020. Patient states that her low back is doing well but she is having worsening pain in left knee. She stats that the pain worsens with flexion of left knee. Patient states that she has trouble sleeping due to pain. Patient states that house work causes the pain to worsen and she has trouble the following day. Patient states that she is having pain with bilateral shoulders. Patient states that when she sews or crochets that she has increase her pain. Patient states daily house hold chores and ADLs.     7/23/2020  presented for sooner appointment due to worsening low back pain. Patient is scheduled for TLIF L3/L4 and L4/L5 on 8/5/2020. Patient has under LESI with some relief in symptoms but pain has gotten to where it is significantly affecting patient's ability to do ADLs and have quality of life.  Patient's radicular pain is significantly worse and patient is having significant muscle spasms. Patient is very sensitive to medications and she has been hestitant to take medication for radicular pain. However, the pain has gotten severe enough she is willing to try. 5/4/2020  presents to the office for sooner office visit. Patient had right SI, right greater trochanteric bursa and right piriformis injected on 3/11/2020. Patient did have an improvement in her low back pain until 4/27/2020. Patient called the nurse line stating that she had an increase in pain and inflammation. She knew it was too soon for a repeat injection was wondering if there is anything that could be done. Patient requested a Medrol Dosepak. Patient was given Medrol Dosepak over the phone and instructed to call if it did not improve. Patient stated that it improved for a short period of time however walking was making the pain worse. She states that she hurts across lower back and then down bilateral lower extremities straight back to the foot. Patient states that she has noticed that she trips a lot with her right foot. She states that she feels like she is leaning and that she cannot stand straight when ambulating. Patient has known instability in low back and had wanted to postpone low back surgery. However patient is stating that she cannot take the pain any longer and she is wanting something to be done to help relieve the pain. Patient does appear to be in pain. Patient did become tearful during exam.  Patient is usually upbeat and . Patient states that pain is affecting her quality of life. Back Pain is a chronic problem. The current episode started more than 1 year ago. The problem occurs constantly. The problem has been rapidly worsening since onset. The pain is present in the gluteal, lumbar spine and sacro-iliac. The quality of the pain is described as aching, cramping, stabbing and shooting. Radiates to: BLE.  The symptoms are aggravated by position, sitting, standing and twisting (Walking). Associated symptoms include leg pain, numbness, tingling and weakness. Pertinent negatives include no bladder incontinence or bowel incontinence. Risk factors include obesity. 12/2019  presents to the office for procedure follow-up and office visit. Patient had right SI and right trochanteric bursa injection with ultrasound and right piriformis on 10/23/2019. Patient sustained 50% relief for approximately 1 month before diminishing of pain. Patient stated that the injection targeted the area of pain.     Patient requested sooner appointment due to increase in low back pain. Patient had called provider at home regarding acute low back pain with numbness to right lower extremity that had started suddenly on Saturday. 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 Patient stated that she had done fine on Friday and then on Saturday she just had and a sudden acute onset of low back pain that was gripping. At the time pain medication was not controlling her pain. Patient stated that she started to have numbness to her right foot that is intermittent and that when she is having the numbness that she feels as if she may fall. Patient rated her pain a 10 out of a 10 she described it is a stabbing in the low back. Patient states that sitting on the toilet will cause an increase in the radicular pain. Patient states that she has to lean on the shopping cart when she is ambulating to help with the low back pain. Patient states that cleaning such as sweeping mopping or doing anything side to side significantly increases low back pain. MRI was ordered of the lumbar spine. Patient does have instability that was on x-ray of the lumbar spine.   However at the time the x-ray was taken patient was not having any type of radicular or nerve symptoms.     10/3/19  presents with referral from [facility] with primary complaints of chronic low back pain.  Pain has been a gradual onset that started around 2006. Ambrosio Delgado is aggravated by lifting bending and walking.  Patient had imaging done by Dr. Selene Rocha in [facility] which indicated she had 2 bulging disks.  Per patient she was told that she had 2 dead disks and 2 bulging disks in the lumbar region. Polo Blood was told that the hip pain was deferred from the bulging disks and this is progressively worsened over the past 12 years. Amari Sotelo states the pain is now constant and causes a limp.  Patient has been tried on several nerve medications doxepin caused patient to sleepwalk, gabapentin caused patient to lose time, and amitriptyline made her feel as if her blood was boiling.  Patient is tried massage therapy but this causes her to become more tense.  Aggravating factors include ambulation prolonged sitting prolonged standing.  Patient states that she does not hurt in the morning but it progresses as the day goes on.     Other areas of pain that will be addressed at a later time is bilateral shoulder pain, hip pain and left knee pain.     Of note: Patient has numerous medical allergies penicillin causes hives, erythromycin causes hives, doxepin causes sleepwalking, gabapentin causes loss of memory, amitriptyline makes her feel like her blood boiling, Daypro eyes swelled shut, Soma horrible headaches, Percocet nightmares, Demerol nausea, statins muscle spasms, meloxicam irritates stomach ulcers, citalopram causes memory loss, menthol and camphor causes hives    Employment: retired     Past Medical History  Past Medical History:   Diagnosis Date    Anxiety     Asthma     Back pain     Colon polyps     DDD (degenerative disc disease), cervical     Depression     Essential hypertension 10/19/2016    GERD (gastroesophageal reflux disease)     Hiatal hernia     HTN (hypertension)     Post-menopausal     Shoulder pain     Tobacco abuse 10/19/2016 Surgery History  Past Surgical History:   Procedure Laterality Date    CHOLECYSTECTOMY      GALLBLADDER SURGERY      HERNIA REPAIR      LAP BAND  08/28/2009    LUMBAR FUSION  2020    LUMBAR SPINE SURGERY N/A 8/5/2020    TLIF L3-4, L4-5 performed by Carolynn Lundberg DO at [address] History  family history includes Alzheimer's Disease in her mother; Parkinsonism in her mother; Stroke in her father; Substance Abuse in her brother. Social History    Social History     Tobacco Use    Smoking status: Current Every Day Smoker     Packs/day: 1.00     Types: Cigarettes     Start date: 10/19/1988    Smokeless tobacco: Never Used   Substance Use Topics    Alcohol use: Not Currently     Comment: rare       Allergies  Daypro [oxaprozin], Doxepin, Gabapentin, Lyrica [pregabalin], Penicillins, Elavil [amitriptyline hcl], Erythromycin, Meloxicam, Percocet [oxycodone-acetaminophen], Soma [carisoprodol], Citalopram, Demerol hcl [meperidine], and Statins     Current Medications  Current Outpatient Medications   Medication Sig Dispense Refill    HYDROcodone-acetaminophen (NORCO)  MG per tablet Take 1 tablet by mouth every 8 hours as needed for Pain for up to 30 days. 90 tablet 0    methocarbamol (ROBAXIN-750) 750 MG tablet Take 1 tablet by mouth 4 times daily as needed (muscle spasm) 120 tablet 5    celecoxib (CELEBREX) 100 MG capsule Take 1 capsule by mouth 2 times daily 60 capsule 2    ondansetron (ZOFRAN-ODT) 4 MG disintegrating tablet Take 4 mg by mouth as needed      ondansetron (ZOFRAN) 4 MG tablet Take 4 mg by mouth      omeprazole (PRILOSEC) 40 MG delayed release capsule Take 40 mg by mouth daily      sertraline (ZOLOFT) 25 MG tablet TAKE 1 TABLET BY MOUTH EVERY DAY      aspirin 81 MG chewable tablet Take 81 mg by mouth daily      ALPRAZolam (XANAX) 1 MG tablet Take 1 mg by mouth nightly as needed for Sleep.       acetaminophen (TYLENOL) 325 MG tablet Take 325 mg by mouth every 6 hours as needed for Pain       diphenhydrAMINE HCl (BENADRYL PO) Take 25 mg by mouth daily as needed       clotrimazole (LOTRIMIN) 1 % cream Apply topically as needed Apply topically 2 times daily.  famotidine (PEPCID) 20 MG tablet Take 20 mg by mouth 2 times daily      mupirocin (BACTROBAN) 2 % ointment Apply topically as needed Apply topically 3 times daily.  hydrochlorothiazide (MICROZIDE) 12.5 MG capsule Take 25 mg by mouth daily       lisinopril (PRINIVIL;ZESTRIL) 20 MG tablet Take 10 mg by mouth daily       nystatin (MYCOSTATIN) 396999 UNIT/GM cream Apply topically as needed Apply topically 2 times daily. No current facility-administered medications for this encounter. Review of Systems:  Review of Systems   Constitutional: Positive for activity change. Gastrointestinal: Negative for bowel incontinence and constipation. Genitourinary: Negative for bladder incontinence. Musculoskeletal: Positive for arthralgias, back pain, gait problem, joint swelling and myalgias. Neurological: Positive for tingling (improved since surgery). Psychiatric/Behavioral: Positive for sleep disturbance. Negative for agitation, self-injury and suicidal ideas. The patient is not nervous/anxious. 14 point ROS negative besides that noted in HPI    Physical Exam:    Vitals:    03/21/22 1532   BP: 130/78   Pulse: 62   Resp: 18   Temp: 98 °F (36.7 °C)   TempSrc: Temporal   SpO2: 96%   Weight: 197 lb (89.4 kg)   Height: 5' 5\" (1.651 m)       Body mass index is 32.78 kg/m². Physical Exam  Vitals and nursing note reviewed. Constitutional:       General: She is not in acute distress. Appearance: She is well-developed. HENT:      Head: Normocephalic. Right Ear: External ear normal.      Left Ear: External ear normal.      Nose: Nose normal.   Eyes:      Conjunctiva/sclera: Conjunctivae normal.      Pupils: Pupils are equal, round, and reactive to light. Neck:      Vascular: No JVD. Trachea: No tracheal deviation. Cardiovascular:      Rate and Rhythm: Normal rate. Pulmonary:      Effort: Pulmonary effort is normal.   Abdominal:      General: There is no distension. Tenderness: There is no abdominal tenderness. Musculoskeletal:      Right shoulder: Tenderness and bony tenderness present. Decreased range of motion. Left shoulder: Tenderness and bony tenderness present. Decreased range of motion. Lumbar back: Spasms and tenderness present. Right knee: Tenderness present. Left knee: Bony tenderness present. Decreased range of motion. Tenderness present over the medial joint line. Comments: + empty can, unable to reach behind back, grimaces with pain during assessment and rom   Skin:     General: Skin is warm and dry. Neurological:      Mental Status: She is alert and oriented to person, place, and time. Cranial Nerves: No cranial nerve deficit. Psychiatric:         Behavior: Behavior normal.         Thought Content: Thought content normal.         Judgment: Judgment normal.       Labs:  Lab Results   Component Value Date     01/27/2021    K 4.0 01/27/2021     01/27/2021    CO2 28 01/27/2021    BUN 16 01/27/2021    CREATININE 0.6 01/27/2021    GLUCOSE 103 01/27/2021    CALCIUM 8.9 01/27/2021        Lab Results   Component Value Date    WBC 10.0 01/27/2021    HGB 11.2 (L) 01/27/2021    HCT 36.5 (L) 01/27/2021    MCV 97.3 01/27/2021     01/27/2021       Assessment:  Active Problems:    Chronic bilateral low back pain with bilateral sciatica    SI (sacroiliac) joint dysfunction    Piriformis syndrome of right side    Chronic pain of both shoulders    Trochanteric bursitis of right hip    Anterolisthesis    Spondylolisthesis at L3-L4 level    Pain medication agreement    Chronic pain of left knee    Primary osteoarthritis of both knees  Resolved Problems:    * No resolved hospital problems.  *      Plan:  Arthritis of knee  Continue medication with refill sent at appointment see refill encounter. - celecoxib (CELEBREX) 100 MG capsule; Take 1 capsule by mouth 2 times daily  Dispense: 60 capsule; Refill: 2    Continue medication with refill sent at appointment see refill encounter. Robaxin 750 mg 4 times a day as needed for myofascial pain    Continue medication with refill sent at appointment see refill encounter. Norco 10 mg every  8 hours prn #90     Schedule right knee with Toradol US since patient had recent fracture do not want to use steroid at this time. Patient my call for right SI joint injection with ultrasound with NP as patient had positive provocative measures. Patient may call for Left knee injection with zilretta. [x] Follow up    [] 4 weeks   [x] 6-8 weeks   [] 10-12 weeks   [] 3 months  [] Post procedure to evaluate effectiveness of treatment  [] To evaluate medications changes made at office visit. [] To review diagnostics ordered at last visit. [] To evaluate response to therapy    [x] For management of controlled substance  [x] Random UDS as indicated by ORT score or if indicated by abberent behaviors    Discussion: Discussed exam findings and plan of care with patient. Patient agreed with POC and questions were asked and answered. Activity: Discussed exercise as beneficial to pain reduction, encouraged daily stretching with a focus on torso strengthening. Goal:  Pain Management Goals of Therapy:   [] Resolution in pain  [x] Decrease in pain level  [x] Improvement in ADL's  [x] Increase in activities with less pain  [] Decrease in medication      Controlled substance monitoring:    [x] Discussed medication side effects, risk of tolerance and/or dependence, and/or alternative treatment  [] Discussed the detrimental effects of long term narcotic use in younger patients especially women of childbearing years.   [x] No signs and symptoms of potential drug abuse or diversion were identified  [] Signs of potential drug abuse or diversion were identified   [] ORT Score   [] UDS non-compliant   [] See Note  [x] Random urine drug screen sent today  [] Random urine drug screen not completed today   [] Deferred New Patient  [] Compliant  Takes CBD oil  [] Not Compliant see note  [] Medication agreement with provider signed today  [x] Medication agreement with provider on file under media   [x] Medication regimen effective and continued   [] New patient continuing current medication while developing POC   [] On going assessment and evaluation of medication regimen  [] Medication regimen not effective see plan for changes  [x] Ursula Peralta reviewed & on file under media       CC:  Delmi Fountain, [facility] - CNP, 3/21/2022 at 4:03 PM    EMR dragon/transcription disclaimer: Much of this encounter note is electronic transcription/translation of spoken language to printed tach. Electronic translation of spoken language may be erroneous, or at times, nonsensical words or phrases may be inadvertently transcribed.  Although, I have reviewed the note for such errors, some may still exist.

## 2022-03-23 DIAGNOSIS — M17.10 ARTHRITIS OF KNEE: ICD-10-CM

## 2022-03-23 DIAGNOSIS — G89.29 CHRONIC PAIN OF BOTH SHOULDERS: ICD-10-CM

## 2022-03-23 DIAGNOSIS — M25.511 CHRONIC PAIN OF BOTH SHOULDERS: ICD-10-CM

## 2022-03-23 DIAGNOSIS — G57.01 PIRIFORMIS SYNDROME OF RIGHT SIDE: ICD-10-CM

## 2022-03-23 DIAGNOSIS — M43.16 SPONDYLOLISTHESIS AT L3-L4 LEVEL: ICD-10-CM

## 2022-03-23 DIAGNOSIS — M25.512 CHRONIC PAIN OF BOTH SHOULDERS: ICD-10-CM

## 2022-03-23 DIAGNOSIS — M43.10 ANTEROLISTHESIS: ICD-10-CM

## 2022-03-23 RX ORDER — HYDROCODONE BITARTRATE AND ACETAMINOPHEN 10; 325 MG/1; MG/1
1 TABLET ORAL EVERY 8 HOURS PRN
Qty: 90 TABLET | Refills: 0 | Status: SHIPPED | OUTPATIENT
Start: 2022-03-23 | End: 2022-04-20 | Stop reason: SDUPTHER

## 2022-03-23 RX ORDER — METHOCARBAMOL 750 MG/1
750 TABLET, FILM COATED ORAL 4 TIMES DAILY PRN
Qty: 120 TABLET | Refills: 5 | Status: SHIPPED | OUTPATIENT
Start: 2022-03-23 | End: 2022-07-21 | Stop reason: SDUPTHER

## 2022-03-23 RX ORDER — CELECOXIB 100 MG/1
100 CAPSULE ORAL 2 TIMES DAILY
Qty: 60 CAPSULE | Refills: 2 | Status: SHIPPED | OUTPATIENT
Start: 2022-03-23 | End: 2022-06-28 | Stop reason: SDUPTHER

## 2022-03-23 RX ORDER — CELECOXIB 100 MG/1
CAPSULE ORAL
Qty: 60 CAPSULE | Refills: 2 | OUTPATIENT
Start: 2022-03-23

## 2022-03-24 NOTE — TELEPHONE ENCOUNTER
Requested Prescriptions     Signed Prescriptions Disp Refills    HYDROcodone-acetaminophen (NORCO)  MG per tablet 90 tablet 0     Sig: Take 1 tablet by mouth every 8 hours as needed for Pain for up to 30 days.      Authorizing Provider: Mono Alvarez celecoxib (CELEBREX) 100 MG capsule 60 capsule 2     Sig: Take 1 capsule by mouth 2 times daily     Authorizing Provider: Girma MCFARLANE    methocarbamol (ROBAXIN-750) 750 MG tablet 120 tablet 5     Sig: Take 1 tablet by mouth 4 times daily as needed (muscle spasm)     Authorizing Provider: Girma MCFARLANE     Refused Prescriptions Disp Refills    celecoxib (CELEBREX) 100 MG capsule [Pharmacy Med Name: CELECOXIB 100MG CAPSULES] 60 capsule 2     Sig: TAKE 1 CAPSULE BY MOUTH TWICE DAILY     Refused By: Garrett Figueroa     Reason for Refusal: Patient should contact provider first

## 2022-04-20 DIAGNOSIS — M25.512 CHRONIC PAIN OF BOTH SHOULDERS: ICD-10-CM

## 2022-04-20 DIAGNOSIS — G89.29 CHRONIC PAIN OF BOTH SHOULDERS: ICD-10-CM

## 2022-04-20 DIAGNOSIS — M43.16 SPONDYLOLISTHESIS AT L3-L4 LEVEL: ICD-10-CM

## 2022-04-20 DIAGNOSIS — M25.511 CHRONIC PAIN OF BOTH SHOULDERS: ICD-10-CM

## 2022-04-22 RX ORDER — HYDROCODONE BITARTRATE AND ACETAMINOPHEN 10; 325 MG/1; MG/1
1 TABLET ORAL EVERY 8 HOURS PRN
Qty: 90 TABLET | Refills: 0 | Status: SHIPPED | OUTPATIENT
Start: 2022-04-23 | End: 2022-05-16 | Stop reason: SDUPTHER

## 2022-05-16 DIAGNOSIS — M25.512 CHRONIC PAIN OF BOTH SHOULDERS: ICD-10-CM

## 2022-05-16 DIAGNOSIS — M43.16 SPONDYLOLISTHESIS AT L3-L4 LEVEL: ICD-10-CM

## 2022-05-16 DIAGNOSIS — G89.29 CHRONIC PAIN OF BOTH SHOULDERS: ICD-10-CM

## 2022-05-16 DIAGNOSIS — M25.511 CHRONIC PAIN OF BOTH SHOULDERS: ICD-10-CM

## 2022-05-18 RX ORDER — HYDROCODONE BITARTRATE AND ACETAMINOPHEN 10; 325 MG/1; MG/1
1 TABLET ORAL EVERY 8 HOURS PRN
Qty: 90 TABLET | Refills: 0 | Status: SHIPPED | OUTPATIENT
Start: 2022-05-23 | End: 2022-06-27 | Stop reason: SDUPTHER

## 2022-06-14 DIAGNOSIS — M17.10 ARTHRITIS OF KNEE: ICD-10-CM

## 2022-06-15 RX ORDER — CELECOXIB 100 MG/1
CAPSULE ORAL
Qty: 60 CAPSULE | Refills: 2 | OUTPATIENT
Start: 2022-06-15

## 2022-06-27 DIAGNOSIS — G89.29 CHRONIC PAIN OF BOTH SHOULDERS: ICD-10-CM

## 2022-06-27 DIAGNOSIS — M43.16 SPONDYLOLISTHESIS AT L3-L4 LEVEL: ICD-10-CM

## 2022-06-27 DIAGNOSIS — M17.10 ARTHRITIS OF KNEE: ICD-10-CM

## 2022-06-27 DIAGNOSIS — M25.511 CHRONIC PAIN OF BOTH SHOULDERS: ICD-10-CM

## 2022-06-27 DIAGNOSIS — M25.512 CHRONIC PAIN OF BOTH SHOULDERS: ICD-10-CM

## 2022-06-28 ENCOUNTER — HOSPITAL ENCOUNTER (OUTPATIENT)
Dept: PAIN MANAGEMENT | Age: 57
Discharge: HOME | End: 2022-06-28
Payer: COMMERCIAL

## 2022-06-28 VITALS
RESPIRATION RATE: 18 BRPM | WEIGHT: 202 LBS | HEIGHT: 65 IN | DIASTOLIC BLOOD PRESSURE: 70 MMHG | TEMPERATURE: 96.9 F | OXYGEN SATURATION: 99 % | HEART RATE: 60 BPM | BODY MASS INDEX: 33.66 KG/M2 | SYSTOLIC BLOOD PRESSURE: 113 MMHG

## 2022-06-28 PROCEDURE — 99214 OFFICE O/P EST MOD 30 MIN: CPT

## 2022-06-28 PROCEDURE — 99214 OFFICE O/P EST MOD 30 MIN: CPT | Performed by: NURSE PRACTITIONER

## 2022-06-28 RX ORDER — HYDROCHLOROTHIAZIDE 25 MG/1
25 TABLET ORAL DAILY
COMMUNITY
Start: 2022-05-17

## 2022-06-28 RX ORDER — HYDROCODONE BITARTRATE AND ACETAMINOPHEN 10; 325 MG/1; MG/1
1 TABLET ORAL EVERY 8 HOURS PRN
Qty: 90 TABLET | Refills: 0 | Status: SHIPPED | OUTPATIENT
Start: 2022-06-28 | End: 2022-07-25 | Stop reason: SDUPTHER

## 2022-06-28 RX ORDER — LISINOPRIL 10 MG/1
10 TABLET ORAL DAILY
COMMUNITY
Start: 2022-05-20

## 2022-06-28 RX ORDER — CELECOXIB 100 MG/1
100 CAPSULE ORAL 2 TIMES DAILY
Qty: 60 CAPSULE | Refills: 2 | Status: SHIPPED | OUTPATIENT
Start: 2022-06-28 | End: 2022-09-28 | Stop reason: SDUPTHER

## 2022-06-28 ASSESSMENT — PAIN DESCRIPTION - ORIENTATION
ORIENTATION: LOWER
ORIENTATION_2: RIGHT
ORIENTATION_3: RIGHT

## 2022-06-28 ASSESSMENT — PAIN DESCRIPTION - LOCATION
LOCATION: BACK
LOCATION_3: SHOULDER
LOCATION_2: KNEE

## 2022-06-28 ASSESSMENT — ENCOUNTER SYMPTOMS
CONSTIPATION: 0
BOWEL INCONTINENCE: 0
BACK PAIN: 1

## 2022-06-28 ASSESSMENT — PAIN DESCRIPTION - PAIN TYPE: TYPE: CHRONIC PAIN

## 2022-06-28 ASSESSMENT — PAIN DESCRIPTION - INTENSITY
RATING_2: 2
RATING_3: 2

## 2022-06-28 ASSESSMENT — PAIN SCALES - GENERAL: PAINLEVEL_OUTOF10: 1

## 2022-06-28 NOTE — PROGRESS NOTES
[facility] Medicine  Office Note    Patient Name: Dorothye Siemens    MR #: [id number]    Account #: [de-identified]    : 1965    Age: 60 y.o. Sex: female    Date: 2022    PCP: Juan Hooker    Chief Complaint:   Chief Complaint   Patient presents with    Back Pain    Knee Pain     right    Shoulder Pain     right       History of Present Illness:     Dorothye Siemens is a 60 y.o. female who presents for 3-month follow-up. Patient's spouse who is also a patient is presently in the hospital.  He was unable to attend appointment. Back Pain  This is a chronic problem. The current episode started more than 1 year ago. The problem occurs constantly. The problem has been waxing and waning since onset. The pain is present in the gluteal, lumbar spine and sacro-iliac. The quality of the pain is described as aching, cramping, shooting and stabbing. Radiates to: BLE. The symptoms are aggravated by sitting, position, standing and twisting (Walking). Associated symptoms include tingling (improved since surgery). Pertinent negatives include no bladder incontinence, bowel incontinence or leg pain (improved since surgery). Knee Pain  This is a chronic problem. The current episode started more than 1 year ago. The problem occurs constantly. The problem has been waxing and waning. Associated symptoms include arthralgias, joint swelling and myalgias. The symptoms are aggravated by bending and walking. Shoulder Pain  This is a chronic problem. The current episode started more than 1 year ago. The problem occurs constantly. The problem has been waxing and waning. Associated symptoms include arthralgias, joint swelling and myalgias. The symptoms are aggravated by bending (lifting, reaching, carrying, house hold chorse).    Screening Tools:    PEG Score: 5.7     Last PEG Score: 6.6     Annual ORT Score: 6     Annual PHQ Score: 7    Current Pain Assessment  Pain Assessment  Pain Assessment: 0-10  Pain Level: 1  Pain Location: Back  Pain Orientation: Lower  Pain Type: Chronic pain  Multiple Pain Sites: Yes    Past Visit HPI:  6/14/2021  Left Knee xray 1/4/2021 Degenerative arthritis. Left Shoulder xray 1/4/2021 unremarkable    Right Shoulder xray 1/4/2021- Degenerative arthritis in [facility] joint irregularity of greater tuberosity with possible chronic degeneration ? Osteophytes (bone spurs) MRI is needed. The irregularities could indicate a chronic tear in Rotator Cuff. MRI of Right Shoulder 5/25/2021 - Full thickness nearly full width tear of supraspinatus tendon with mild retractation Partial thickness tear of infraspinatus tendon Retracted proximal tear of biceps tendon with tear in the distal supraspinatus muscle. Chronic arthritic degeneration and changes with suspected impingement of supraspinatus muscle. 1/2021  presents for 3 month follow up. Patient is being seen along with her spouse. Patient had TLIF of L3-4, L4-5 for spondylolisthesis of L3-4 on 8/05/2020. Patient states that her low back is doing well but she is having worsening pain in left knee. She stats that the pain worsens with flexion of left knee. Patient states that she has trouble sleeping due to pain. Patient states that house work causes the pain to worsen and she has trouble the following day. Patient states that she is having pain with bilateral shoulders. Patient states that when she sews or crochets that she has increase her pain. Patient states daily house hold chores and ADLs.     7/23/2020  presented for sooner appointment due to worsening low back pain. Patient is scheduled for TLIF L3/L4 and L4/L5 on 8/5/2020. Patient has under LESI with some relief in symptoms but pain has gotten to where it is significantly affecting patient's ability to do ADLs and have quality of life. Patient's radicular pain is significantly worse and patient is having significant muscle spasms.  Patient is very sensitive to medications and she has been hestitant to take medication for radicular pain. However, the pain has gotten severe enough she is willing to try. 5/4/2020  presents to the office for sooner office visit. Patient had right SI, right greater trochanteric bursa and right piriformis injected on 3/11/2020. Patient did have an improvement in her low back pain until 4/27/2020. Patient called the nurse line stating that she had an increase in pain and inflammation. She knew it was too soon for a repeat injection was wondering if there is anything that could be done. Patient requested a Medrol Dosepak. Patient was given Medrol Dosepak over the phone and instructed to call if it did not improve. Patient stated that it improved for a short period of time however walking was making the pain worse. She states that she hurts across lower back and then down bilateral lower extremities straight back to the foot. Patient states that she has noticed that she trips a lot with her right foot. She states that she feels like she is leaning and that she cannot stand straight when ambulating. Patient has known instability in low back and had wanted to postpone low back surgery. However patient is stating that she cannot take the pain any longer and she is wanting something to be done to help relieve the pain. Patient does appear to be in pain. Patient did become tearful during exam.  Patient is usually upbeat and . Patient states that pain is affecting her quality of life. Back Pain is a chronic problem. The current episode started more than 1 year ago. The problem occurs constantly. The problem has been rapidly worsening since onset. The pain is present in the gluteal, lumbar spine and sacro-iliac. The quality of the pain is described as aching, cramping, stabbing and shooting. Radiates to: BLE. The symptoms are aggravated by position, sitting, standing and twisting (Walking).  Associated symptoms include leg pain, numbness, tingling and weakness. Pertinent negatives include no bladder incontinence or bowel incontinence. Risk factors include obesity. 12/2019  presents to the office for procedure follow-up and office visit. Patient had right SI and right trochanteric bursa injection with ultrasound and right piriformis on 10/23/2019. Patient sustained 50% relief for approximately 1 month before diminishing of pain. Patient stated that the injection targeted the area of pain. Patient requested sooner appointment due to increase in low back pain. Patient had called provider at home regarding acute low back pain with numbness to right lower extremity that had started suddenly on Saturday. 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 Patient stated that she had done fine on Friday and then on Saturday she just had and a sudden acute onset of low back pain that was gripping. At the time pain medication was not controlling her pain. Patient stated that she started to have numbness to her right foot that is intermittent and that when she is having the numbness that she feels as if she may fall. Patient rated her pain a 10 out of a 10 she described it is a stabbing in the low back. Patient states that sitting on the toilet will cause an increase in the radicular pain. Patient states that she has to lean on the shopping cart when she is ambulating to help with the low back pain. Patient states that cleaning such as sweeping mopping or doing anything side to side significantly increases low back pain. MRI was ordered of the lumbar spine. Patient does have instability that was on x-ray of the lumbar spine. However at the time the x-ray was taken patient was not having any type of radicular or nerve symptoms. 10/3/19  presents with referral from [address] [place] [address] with primary complaints of chronic low back pain. Pain has been a gradual onset that started around 2006.   Pain is aggravated by lifting bending and walking. Patient had imaging done by Dr. Kelsey Haywood in [address] which indicated she had 2 bulging disks. Per patient she was told that she had 2 dead disks and 2 bulging disks in the lumbar region. She was told that the hip pain was deferred from the bulging disks and this is progressively worsened over the past 12 years. Patient states the pain is now constant and causes a limp. Patient has been tried on several nerve medications doxepin caused patient to sleepwalk, gabapentin caused patient to lose time, and amitriptyline made her feel as if her blood was boiling. Patient is tried massage therapy but this causes her to become more tense. Aggravating factors include ambulation prolonged sitting prolonged standing. Patient states that she does not hurt in the morning but it progresses as the day goes on. Other areas of pain that will be addressed at a later time is bilateral shoulder pain, hip pain and left knee pain.      Of note: Patient has numerous medical allergies penicillin causes hives, erythromycin causes hives, doxepin causes sleepwalking, gabapentin causes loss of memory, amitriptyline makes her feel like her blood boiling, Daypro eyes swelled shut, Soma horrible headaches, Percocet nightmares, Demerol nausea, statins muscle spasms, meloxicam irritates stomach ulcers, citalopram causes memory loss, menthol and camphor causes hives    Employment: retired     Past Medical History  Past Medical History:   Diagnosis Date    Anxiety     Asthma     Back pain     Colon polyps     DDD (degenerative disc disease), cervical     Depression     Essential hypertension 10/19/2016    GERD (gastroesophageal reflux disease)     Hiatal hernia     HTN (hypertension)     Post-menopausal     Shoulder pain     Tobacco abuse 10/19/2016       Surgery History  Past Surgical History:   Procedure Laterality Date    CHOLECYSTECTOMY      GALLBLADDER SURGERY      HERNIA REPAIR JOINT REPLACEMENT Right     LAP BAND  08/28/2009    LUMBAR FUSION  2020    LUMBAR SPINE SURGERY N/A 8/5/2020    TLIF L3-4, L4-5 performed by Abril Campos DO at [address] History  family history includes Alzheimer's Disease in her mother; Parkinsonism in her mother; Stroke in her father; Substance Abuse in her brother. Social History    Social History     Tobacco Use    Smoking status: Current Every Day Smoker     Packs/day: 1.00     Types: Cigarettes     Start date: 10/19/1988    Smokeless tobacco: Never Used   Substance Use Topics    Alcohol use: Not Currently     Comment: rare       Allergies  Daypro [oxaprozin], Doxepin, Gabapentin, Lyrica [pregabalin], Penicillins, Elavil [amitriptyline hcl], Erythromycin, Meloxicam, Percocet [oxycodone-acetaminophen], Soma [carisoprodol], Citalopram, Demerol hcl [meperidine], and Statins     Current Medications  Current Outpatient Medications   Medication Sig Dispense Refill    lisinopril (PRINIVIL;ZESTRIL) 10 MG tablet Take 10 mg by mouth daily      sertraline (ZOLOFT) 50 MG tablet Take 50 mg by mouth daily      hydroCHLOROthiazide (HYDRODIURIL) 25 MG tablet Take 25 mg by mouth daily      HYDROcodone-acetaminophen (NORCO)  MG per tablet Take 1 tablet by mouth every 8 hours as needed for Pain for up to 30 days.  (may fill 5/23/22) 90 tablet 0    celecoxib (CELEBREX) 100 MG capsule Take 1 capsule by mouth 2 times daily 60 capsule 2    methocarbamol (ROBAXIN-750) 750 MG tablet Take 1 tablet by mouth 4 times daily as needed (muscle spasm) 120 tablet 5    ondansetron (ZOFRAN) 4 MG tablet Take 4 mg by mouth      omeprazole (PRILOSEC) 40 MG delayed release capsule Take 40 mg by mouth daily      aspirin 81 MG chewable tablet Take 81 mg by mouth daily      ALPRAZolam (XANAX) 1 MG tablet Take 1 mg by mouth nightly as needed for Sleep.      acetaminophen (TYLENOL) 325 MG tablet Take 325 mg by mouth every 6 hours as needed for Pain       diphenhydrAMINE HCl (BENADRYL PO) Take 25 mg by mouth daily as needed       clotrimazole (LOTRIMIN) 1 % cream Apply topically as needed Apply topically 2 times daily. famotidine (PEPCID) 20 MG tablet Take 20 mg by mouth 2 times daily      mupirocin (BACTROBAN) 2 % ointment Apply topically as needed Apply topically 3 times daily. nystatin (MYCOSTATIN) 660671 UNIT/GM cream Apply topically as needed Apply topically 2 times daily. No current facility-administered medications for this encounter. Review of Systems:  Review of Systems   Constitutional: Positive for activity change. Gastrointestinal: Negative for bowel incontinence and constipation. Genitourinary: Negative for bladder incontinence. Musculoskeletal: Positive for arthralgias, back pain, gait problem, joint swelling and myalgias. Neurological: Positive for tingling (improved since surgery). Psychiatric/Behavioral: Positive for sleep disturbance. Negative for agitation, self-injury and suicidal ideas. The patient is not nervous/anxious. 14 point ROS negative besides that noted in HPI    Physical Exam:    Vitals:    06/28/22 1542   BP: 113/70   Pulse: 60   Resp: 18   Temp: 96.9 °F (36.1 °C)   TempSrc: Temporal   SpO2: 99%   Weight: 202 lb (91.6 kg)   Height: 5' 5\" (1.651 m)       Body mass index is 33.61 kg/m². Physical Exam  Vitals and nursing note reviewed. Constitutional:       General: She is not in acute distress. Appearance: She is well-developed. HENT:      Head: Normocephalic. Right Ear: External ear normal.      Left Ear: External ear normal.      Nose: Nose normal.   Eyes:      Conjunctiva/sclera: Conjunctivae normal.      Pupils: Pupils are equal, round, and reactive to light. Neck:      Vascular: No JVD. Trachea: No tracheal deviation. Cardiovascular:      Rate and Rhythm: Normal rate. Pulmonary:      Effort: Pulmonary effort is normal.   Abdominal:      General: There is no distension.       Tenderness: There is no abdominal tenderness. Musculoskeletal:      Right shoulder: Tenderness and bony tenderness present. Decreased range of motion. Left shoulder: Tenderness and bony tenderness present. Decreased range of motion. Lumbar back: Spasms and tenderness present. Right knee: Tenderness present. Left knee: Bony tenderness present. Decreased range of motion. Tenderness present over the medial joint line. Comments: + empty can, unable to reach behind back, grimaces with pain during assessment and rom   Skin:     General: Skin is warm and dry. Neurological:      Mental Status: She is alert and oriented to person, place, and time. Cranial Nerves: No cranial nerve deficit. Psychiatric:         Behavior: Behavior normal.         Thought Content: Thought content normal.         Judgment: Judgment normal.       Labs:  Lab Results   Component Value Date     01/27/2021    K 4.0 01/27/2021     01/27/2021    CO2 28 01/27/2021    BUN 16 01/27/2021    CREATININE 0.6 01/27/2021    GLUCOSE 103 01/27/2021    CALCIUM 8.9 01/27/2021        Lab Results   Component Value Date    WBC 10.0 01/27/2021    HGB 11.2 (L) 01/27/2021    HCT 36.5 (L) 01/27/2021    MCV 97.3 01/27/2021     01/27/2021       Assessment:  Active Problems:    Chronic bilateral low back pain with bilateral sciatica    SI (sacroiliac) joint dysfunction    Piriformis syndrome of right side    Chronic pain of both shoulders    Trochanteric bursitis of right hip    Anterolisthesis    Spondylolisthesis at L3-L4 level    Pain medication agreement    Chronic pain of left knee    Primary osteoarthritis of both knees  Resolved Problems:    * No resolved hospital problems. *      Plan:  Arthritis of knee  Continue medication with no refill sent at appointment see refill encounter. 6/27/2022  - celecoxib (CELEBREX) 100 MG capsule; Take 1 capsule by mouth 2 times daily  Dispense: 60 capsule;  Refill: 2    Continue medication with no refill sent at appointment see refill encounter. Robaxin 750 mg 4 times a day as needed for myofascial pain    Continue medication with no refill sent at appointment see refill encounter. 6/27/2022  Norco 10 mg every  8 hours prn #90     Schedule right SI joint injection with ultrasound with NP as patient had positive provocative measures. Patient may call for Left knee injection with zilretta. Schedule Right CMC injection due to arthritis. [x] Follow up    [] 4 weeks   [x] 6-8 weeks   [] 10-12 weeks   [] 3 months  [] Post procedure to evaluate effectiveness of treatment  [] To evaluate medications changes made at office visit. [] To review diagnostics ordered at last visit. [] To evaluate response to therapy    [x] For management of controlled substance  [x] Random UDS as indicated by ORT score or if indicated by abberent behaviors    Discussion: Discussed exam findings and plan of care with patient. Patient agreed with POC and questions were asked and answered. Activity: Discussed exercise as beneficial to pain reduction, encouraged daily stretching with a focus on torso strengthening. Goal:  Pain Management Goals of Therapy:   [] Resolution in pain  [x] Decrease in pain level  [x] Improvement in ADL's  [x] Increase in activities with less pain  [] Decrease in medication      Controlled substance monitoring:    [x] Discussed medication side effects, risk of tolerance and/or dependence, and/or alternative treatment  [] Discussed the detrimental effects of long term narcotic use in younger patients especially women of childbearing years.   [x] No signs and symptoms of potential drug abuse or diversion were identified  [] Signs of potential drug abuse or diversion were identified   [] ORT Score   [] UDS non-compliant   [] See Note  [x] Random urine drug screen sent today  [] Random urine drug screen not completed today   [] Deferred New Patient  [] Compliant  Takes CBD oil  [] Not Compliant see note  [x] Medication agreement with provider signed today  [] Medication agreement with provider on file under media   [x] Medication regimen effective and continued   [] New patient continuing current medication while developing POC   [] On going assessment and evaluation of medication regimen  [] Medication regimen not effective see plan for changes  [x] Keena Murray reviewed & on file under media       CC:  Larissa Gonzalez, APRN - CNP, 6/28/2022 at 4:11 PM    EMR dragon/transcription disclaimer: Much of this encounter note is electronic transcription/translation of spoken language to printed tach. Electronic translation of spoken language may be erroneous, or at times, nonsensical words or phrases may be inadvertently transcribed.  Although, I have reviewed the note for such errors, some may still exist.

## 2022-06-28 NOTE — PROGRESS NOTES
Clinic Documentation      Education Provided:  [x] Review of Debbie Mi  [x] Agreement Review  [x] PEG Score Calculated [x] PHQ Score Calculated [x] ORT Score Calculated    [] Compliance Issues Discussed [] Cognitive Behavior Needs [x] Exercise [] Review of Test [] Financial Issues  [x] Tobacco/Alcohol Use Reviewed [x] Teaching [] New Patient [] Picture Obtained    Physician Plan:  [] Outgoing Referral  [] Pharmacy Consult  [] Test Ordered [] Prescription Ordered/Changed   [] Obtained Test Results / Consult Notes        Complete if patient is withholding blood thinner for procedure     Blood Thinner Patient is currently taking:      [] Plavix (Hold for 7 days)  [] Aspirin (Hold for 5 days)     [] Pletal (Hold for 2 days)  [] Pradaxa (Hold for 3 days)    [] Effient (Hold for 7 days)  [] Xarelto (Hold for 2 days)    [] Eliquis (Hold for 2 days)  [] Brilinta (Hold for 7 days)    [] Coumadin (Hold for 5 days) - (INR needs to be drawn the day prior to procedure- INR < 2.0)    [] Aggrenox (Hold for 7 days)        [] Patient will stop medication on their own.    [] Blood Thinner Form Faxed for approval to hold.    Provider form faxed to:     Assessment Completed by:  Electronically signed by Marc Abel RN on 6/28/2022 at 4:28 PM

## 2022-07-13 ENCOUNTER — HOSPITAL ENCOUNTER (OUTPATIENT)
Dept: PAIN MANAGEMENT | Age: 57
Discharge: HOME | End: 2022-07-13
Payer: COMMERCIAL

## 2022-07-13 VITALS
TEMPERATURE: 97.2 F | RESPIRATION RATE: 18 BRPM | DIASTOLIC BLOOD PRESSURE: 59 MMHG | HEART RATE: 74 BPM | SYSTOLIC BLOOD PRESSURE: 93 MMHG | OXYGEN SATURATION: 98 %

## 2022-07-13 DIAGNOSIS — M18.11 ARTHRITIS OF CARPOMETACARPAL (CMC) JOINT OF RIGHT THUMB: ICD-10-CM

## 2022-07-13 PROCEDURE — 20600 DRAIN/INJ JOINT/BURSA W/O US: CPT

## 2022-07-13 PROCEDURE — 20611 DRAIN/INJ JOINT/BURSA W/US: CPT

## 2022-07-13 PROCEDURE — 6360000002 HC RX W HCPCS

## 2022-07-13 PROCEDURE — 76942 ECHO GUIDE FOR BIOPSY: CPT

## 2022-07-13 PROCEDURE — 2500000003 HC RX 250 WO HCPCS

## 2022-07-13 PROCEDURE — 20600 DRAIN/INJ JOINT/BURSA W/O US: CPT | Performed by: NURSE PRACTITIONER

## 2022-07-13 PROCEDURE — 20552 NJX 1/MLT TRIGGER POINT 1/2: CPT | Performed by: NURSE PRACTITIONER

## 2022-07-13 RX ORDER — TRIAMCINOLONE ACETONIDE 40 MG/ML
10 INJECTION, SUSPENSION INTRA-ARTICULAR; INTRAMUSCULAR ONCE
Status: DISCONTINUED | OUTPATIENT
Start: 2022-07-13 | End: 2022-07-15 | Stop reason: HOSPADM

## 2022-07-13 RX ORDER — BUPIVACAINE HYDROCHLORIDE 5 MG/ML
0.25 INJECTION, SOLUTION EPIDURAL; INTRACAUDAL ONCE
Status: DISCONTINUED | OUTPATIENT
Start: 2022-07-13 | End: 2022-07-15 | Stop reason: HOSPADM

## 2022-07-13 RX ORDER — LIDOCAINE HYDROCHLORIDE 10 MG/ML
2 INJECTION, SOLUTION EPIDURAL; INFILTRATION; INTRACAUDAL; PERINEURAL ONCE
Status: DISCONTINUED | OUTPATIENT
Start: 2022-07-13 | End: 2022-07-15 | Stop reason: HOSPADM

## 2022-07-13 RX ORDER — BUPIVACAINE HYDROCHLORIDE 5 MG/ML
2 INJECTION, SOLUTION EPIDURAL; INTRACAUDAL ONCE
Status: DISCONTINUED | OUTPATIENT
Start: 2022-07-13 | End: 2022-07-15 | Stop reason: HOSPADM

## 2022-07-13 RX ORDER — TRIAMCINOLONE ACETONIDE 40 MG/ML
40 INJECTION, SUSPENSION INTRA-ARTICULAR; INTRAMUSCULAR ONCE
Status: DISCONTINUED | OUTPATIENT
Start: 2022-07-13 | End: 2022-07-15 | Stop reason: HOSPADM

## 2022-07-13 RX ORDER — LIDOCAINE HYDROCHLORIDE 10 MG/ML
0.25 INJECTION, SOLUTION EPIDURAL; INFILTRATION; INTRACAUDAL; PERINEURAL ONCE
Status: DISCONTINUED | OUTPATIENT
Start: 2022-07-13 | End: 2022-07-15 | Stop reason: HOSPADM

## 2022-07-13 RX ORDER — TRIAMCINOLONE ACETONIDE 40 MG/ML
20 INJECTION, SUSPENSION INTRA-ARTICULAR; INTRAMUSCULAR ONCE
Status: DISCONTINUED | OUTPATIENT
Start: 2022-07-13 | End: 2022-07-13

## 2022-07-13 RX ORDER — LIDOCAINE HYDROCHLORIDE 10 MG/ML
0.5 INJECTION, SOLUTION EPIDURAL; INFILTRATION; INTRACAUDAL; PERINEURAL ONCE
Status: DISCONTINUED | OUTPATIENT
Start: 2022-07-13 | End: 2022-07-13

## 2022-07-13 NOTE — PROCEDURES
there be any complications or questions to arise between today and their next appointment. Plan:    The patient will follow up in the office in approximately 6 weeks. DAYSI Qureshi CNP, 2022 at 8:59 PM   [facility]    Patient Name: Peggy Roger    : 1965    Age: 60 y.o. Sex: female    Date: 2022    Pre-op Diagnosis: right  Sacroiliac Joint(s) Dysfunction/ Sacroiliitis    Post-op Diagnosis: right  Sacroiliac Joint(s) Dysfunction/ Sacroliliits    Procedure: Ultrasound Guided Injection of  right Sacroiliac Joint(s)     Performing Procedure:  KARINE Orozco    Patient Vitals for the past 24 hrs:   BP Temp Temp src Pulse Resp SpO2   22 1118 (!) 93/59 97.2 °F (36.2 °C) Temporal 74 18 98 %       right  Sacroiliac Joint(s)     Description of Procedure:    After voluntary, informed and signed consent obtained the patient was placed in a prone position. Appropriate time out was obtained per policy. The Sacroiliac Joint(s) was palpated for area of maximal tenderness. The area was prepped in an aseptic fashion with CHG swab. The ultrasound transducer was used to confirm the appropriate location. The skin was sprayed with Gebauer's Solution. Under aseptic technique and direct ultrasound visualization a 22 gauge 3 inch spinal needle was introduced into the Sacroiliac Joint(s). After a negative aspiration, a solution of 2 ml of 0.5% Marcaine Plain and 2 ml of 1% Lidocaine Plain and 1 ml of Kenalog (40 mg/ml) was injected into the Sacroiliac Joint(s). The needle was withdrawn and a sterile dressing applied. If this was a bilateral procedure, the same steps were followed on the opposite side. Discharge: The patient tolerated the procedure well. There were no complications during the procedure and the patient was discharged home with discharge instructions.     The patient has been instructed to contact the office should there be

## 2022-07-19 ENCOUNTER — TELEPHONE (OUTPATIENT)
Dept: PAIN MANAGEMENT | Age: 57
End: 2022-07-19

## 2022-07-19 DIAGNOSIS — G57.01 PIRIFORMIS SYNDROME OF RIGHT SIDE: ICD-10-CM

## 2022-07-19 DIAGNOSIS — M43.10 ANTEROLISTHESIS: ICD-10-CM

## 2022-07-19 NOTE — TELEPHONE ENCOUNTER
Spoke with pt concerning her injections she had on 07/13. Pt states she has been a little sore, but good for the most part. Pt is a 5-6/10 today and is 50% better. No further questions at this time.

## 2022-07-21 RX ORDER — METHOCARBAMOL 750 MG/1
750 TABLET, FILM COATED ORAL 4 TIMES DAILY PRN
Qty: 120 TABLET | Refills: 5 | Status: SHIPPED | OUTPATIENT
Start: 2022-07-21 | End: 2022-10-18 | Stop reason: SDUPTHER

## 2022-07-25 DIAGNOSIS — M25.511 CHRONIC PAIN OF BOTH SHOULDERS: ICD-10-CM

## 2022-07-25 DIAGNOSIS — G89.29 CHRONIC PAIN OF BOTH SHOULDERS: ICD-10-CM

## 2022-07-25 DIAGNOSIS — M25.512 CHRONIC PAIN OF BOTH SHOULDERS: ICD-10-CM

## 2022-07-25 DIAGNOSIS — M43.16 SPONDYLOLISTHESIS AT L3-L4 LEVEL: ICD-10-CM

## 2022-07-25 RX ORDER — HYDROCODONE BITARTRATE AND ACETAMINOPHEN 10; 325 MG/1; MG/1
1 TABLET ORAL EVERY 8 HOURS PRN
Qty: 90 TABLET | Refills: 0 | Status: SHIPPED | OUTPATIENT
Start: 2022-07-29 | End: 2022-08-31 | Stop reason: SDUPTHER

## 2022-08-29 DIAGNOSIS — M25.512 CHRONIC PAIN OF BOTH SHOULDERS: ICD-10-CM

## 2022-08-29 DIAGNOSIS — G89.29 CHRONIC PAIN OF BOTH SHOULDERS: ICD-10-CM

## 2022-08-29 DIAGNOSIS — M25.511 CHRONIC PAIN OF BOTH SHOULDERS: ICD-10-CM

## 2022-08-29 DIAGNOSIS — M43.16 SPONDYLOLISTHESIS AT L3-L4 LEVEL: ICD-10-CM

## 2022-08-29 NOTE — TELEPHONE ENCOUNTER
Patient called on 08/26/22. The office is closed on Fridays. Patient has an appointment on 8/30/22 with Radha Oneal.

## 2022-08-31 RX ORDER — HYDROCODONE BITARTRATE AND ACETAMINOPHEN 10; 325 MG/1; MG/1
1 TABLET ORAL EVERY 8 HOURS PRN
Qty: 90 TABLET | Refills: 0 | Status: SHIPPED | OUTPATIENT
Start: 2022-08-31 | End: 2022-09-28 | Stop reason: SDUPTHER

## 2022-09-24 DIAGNOSIS — M17.10 ARTHRITIS OF KNEE: ICD-10-CM

## 2022-09-28 DIAGNOSIS — G89.29 CHRONIC PAIN OF BOTH SHOULDERS: ICD-10-CM

## 2022-09-28 DIAGNOSIS — M25.511 CHRONIC PAIN OF BOTH SHOULDERS: ICD-10-CM

## 2022-09-28 DIAGNOSIS — M17.10 ARTHRITIS OF KNEE: ICD-10-CM

## 2022-09-28 DIAGNOSIS — M43.16 SPONDYLOLISTHESIS AT L3-L4 LEVEL: ICD-10-CM

## 2022-09-28 DIAGNOSIS — M25.512 CHRONIC PAIN OF BOTH SHOULDERS: ICD-10-CM

## 2022-09-28 RX ORDER — HYDROCODONE BITARTRATE AND ACETAMINOPHEN 10; 325 MG/1; MG/1
1 TABLET ORAL EVERY 8 HOURS PRN
Qty: 90 TABLET | Refills: 0 | Status: SHIPPED | OUTPATIENT
Start: 2022-10-01 | End: 2022-10-18 | Stop reason: SDUPTHER

## 2022-09-28 RX ORDER — CELECOXIB 100 MG/1
100 CAPSULE ORAL 2 TIMES DAILY
Qty: 60 CAPSULE | Refills: 2 | Status: SHIPPED | OUTPATIENT
Start: 2022-09-28 | End: 2022-10-18 | Stop reason: SDUPTHER

## 2022-10-18 ENCOUNTER — HOSPITAL ENCOUNTER (OUTPATIENT)
Dept: PAIN MANAGEMENT | Age: 57
Discharge: HOME | End: 2022-10-18
Payer: COMMERCIAL

## 2022-10-18 VITALS
HEART RATE: 59 BPM | BODY MASS INDEX: 33.32 KG/M2 | TEMPERATURE: 97.4 F | OXYGEN SATURATION: 95 % | SYSTOLIC BLOOD PRESSURE: 110 MMHG | DIASTOLIC BLOOD PRESSURE: 60 MMHG | WEIGHT: 200 LBS | RESPIRATION RATE: 16 BRPM | HEIGHT: 65 IN

## 2022-10-18 DIAGNOSIS — G57.01 PIRIFORMIS SYNDROME OF RIGHT SIDE: ICD-10-CM

## 2022-10-18 DIAGNOSIS — M43.16 SPONDYLOLISTHESIS AT L3-L4 LEVEL: ICD-10-CM

## 2022-10-18 DIAGNOSIS — M17.10 ARTHRITIS OF KNEE: ICD-10-CM

## 2022-10-18 DIAGNOSIS — M25.512 CHRONIC PAIN OF BOTH SHOULDERS: ICD-10-CM

## 2022-10-18 DIAGNOSIS — M25.511 CHRONIC PAIN OF BOTH SHOULDERS: ICD-10-CM

## 2022-10-18 DIAGNOSIS — G89.29 CHRONIC PAIN OF BOTH SHOULDERS: ICD-10-CM

## 2022-10-18 DIAGNOSIS — M43.10 ANTEROLISTHESIS: ICD-10-CM

## 2022-10-18 PROCEDURE — 99213 OFFICE O/P EST LOW 20 MIN: CPT

## 2022-10-18 RX ORDER — HYDROCODONE BITARTRATE AND ACETAMINOPHEN 10; 325 MG/1; MG/1
1 TABLET ORAL EVERY 8 HOURS PRN
Qty: 90 TABLET | Refills: 0 | Status: SHIPPED | OUTPATIENT
Start: 2022-10-31 | End: 2022-11-30

## 2022-10-18 RX ORDER — SERTRALINE HYDROCHLORIDE 25 MG/1
1 TABLET, FILM COATED ORAL DAILY
COMMUNITY
Start: 2022-09-17

## 2022-10-18 RX ORDER — METHOCARBAMOL 750 MG/1
750 TABLET, FILM COATED ORAL 4 TIMES DAILY PRN
Qty: 120 TABLET | Refills: 5 | Status: SHIPPED | OUTPATIENT
Start: 2022-10-18 | End: 2023-04-16

## 2022-10-18 RX ORDER — CELECOXIB 100 MG/1
100 CAPSULE ORAL 2 TIMES DAILY
Qty: 60 CAPSULE | Refills: 5 | Status: SHIPPED | OUTPATIENT
Start: 2022-10-18

## 2022-10-18 ASSESSMENT — ENCOUNTER SYMPTOMS
BACK PAIN: 1
CONSTIPATION: 0
BOWEL INCONTINENCE: 0

## 2022-10-18 ASSESSMENT — PAIN DESCRIPTION - ORIENTATION
ORIENTATION_3: RIGHT
ORIENTATION_2: RIGHT
ORIENTATION: LOWER

## 2022-10-18 ASSESSMENT — PAIN DESCRIPTION - INTENSITY
RATING_2: 2
RATING_3: 2

## 2022-10-18 ASSESSMENT — PAIN DESCRIPTION - LOCATION
LOCATION_2: KNEE
LOCATION: BACK
LOCATION_3: SHOULDER

## 2022-10-18 ASSESSMENT — PAIN DESCRIPTION - PAIN TYPE: TYPE: CHRONIC PAIN

## 2022-10-18 ASSESSMENT — PAIN SCALES - GENERAL: PAINLEVEL_OUTOF10: 6

## 2022-10-18 NOTE — TELEPHONE ENCOUNTER
1. Chronic pain of both shoulders  - HYDROcodone-acetaminophen (NORCO)  MG per tablet; Take 1 tablet by mouth every 8 hours as needed for Pain for up to 30 days. Dispense: 90 tablet; Refill: 0    2. Spondylolisthesis at L3-L4 level  - HYDROcodone-acetaminophen (NORCO)  MG per tablet; Take 1 tablet by mouth every 8 hours as needed for Pain for up to 30 days. Dispense: 90 tablet; Refill: 0    3. Anterolisthesis  - methocarbamol (ROBAXIN-750) 750 MG tablet; Take 1 tablet by mouth 4 times daily as needed (muscle spasm)  Dispense: 120 tablet; Refill: 5    4. Piriformis syndrome of right side  - methocarbamol (ROBAXIN-750) 750 MG tablet; Take 1 tablet by mouth 4 times daily as needed (muscle spasm)  Dispense: 120 tablet; Refill: 5    5. Arthritis of knee  - celecoxib (CELEBREX) 100 MG capsule; Take 1 capsule by mouth 2 times daily  Dispense: 60 capsule;  Refill: 5

## 2022-10-18 NOTE — PROGRESS NOTES
[facility] Physical & Pain Medicine  Office Note    Patient Name: Leah Patel    MR #: [id number]    Account #: [de-identified]    : 1965    Age: 60 y.o. Sex: female    Date: 10/18/2022    PCP: Cynthia Craig    Chief Complaint:   Chief Complaint   Patient presents with    Back Pain    Knee Pain     right    Shoulder Pain     right       History of Present Illness:     eLah Patel is a 60 y.o. female who presents for 3 month follow up. Patient's spouse is present as both are patient's. Patient had right SI joint injection on 2022. Patient had at least 85% relief of pain from procedure(s) for at least 6 weeks and was able to increase activity after procedure. Patient received enough pain relief from injections that the patient would like to repeat the injection(s). However patient does not feel injection is needed at this time. Patient had right Aia 16 on 2022. Patient had at least 85% relief of pain from procedure(s) for at least 6 weeks and was able to increase activity after procedure. Patient received enough pain relief from injections that the patient would like to repeat the injection(s). However, patient does not feel that injection is needed at this time. Back Pain  This is a chronic problem. The current episode started more than 1 year ago. The problem occurs constantly. The problem has been waxing and waning since onset. The pain is present in the gluteal, lumbar spine and sacro-iliac. The quality of the pain is described as aching, cramping, shooting and stabbing. Radiates to: BLE. The symptoms are aggravated by twisting, sitting, position and standing (Walking). Associated symptoms include tingling (improved since surgery). Pertinent negatives include no bladder incontinence, bowel incontinence or leg pain (improved since surgery). Knee Pain  This is a chronic problem. The current episode started more than 1 year ago. The problem occurs constantly.  The problem has been waxing and waning. Associated symptoms include arthralgias, joint swelling and myalgias. The symptoms are aggravated by walking and bending. Shoulder Pain  This is a chronic problem. The current episode started more than 1 year ago. The problem occurs constantly. The problem has been waxing and waning. Associated symptoms include arthralgias, joint swelling and myalgias. The symptoms are aggravated by bending (lifting, reaching, carrying, house hold chorse). Screening Tools:    PEG Score: 6     Last PEG Score: 5.7     Annual ORT Score: 6     Annual PHQ Score: 7    Current Pain Assessment  Pain Assessment  Pain Assessment: 0-10  Pain Level: 6  Pain Location: Back  Pain Orientation: Lower  Pain Type: Chronic pain    Past Effective injections:   Past Effective Procedures:  Procedures in which patient has had at least 85% pain relief from procedure(s) for at least 6 weeks and was able to increase activity after procedure. These procedures are not needed routinely but during exacerbations of chronic pain. Right SI    Right Troch bursa    Right Piriformis    Left Knee    Right [facility]    Past Imaging:  Left Knee xray 1/4/2021 Degenerative arthritis. Left Shoulder xray 1/4/2021 unremarkable    Right Shoulder xray 1/4/2021- Degenerative arthritis in [facility] joint irregularity of greater tuberosity with possible chronic degeneration ? Osteophytes (bone spurs) MRI is needed. The irregularities could indicate a chronic tear in Rotator Cuff. MRI of Right Shoulder 5/25/2021 - Full thickness nearly full width tear of supraspinatus tendon with mild retractation Partial thickness tear of infraspinatus tendon Retracted proximal tear of biceps tendon with tear in the distal supraspinatus muscle. Chronic arthritic degeneration and changes with suspected impingement of supraspinatus muscle. Past Visit HPI:  1/2021  presents for 3 month follow up. Patient is being seen along with her spouse.  Patient had TLIF of L3-4, L4-5 for spondylolisthesis of L3-4 on 8/05/2020. Patient states that her low back is doing well but she is having worsening pain in left knee. She stats that the pain worsens with flexion of left knee. Patient states that she has trouble sleeping due to pain. Patient states that house work causes the pain to worsen and she has trouble the following day. Patient states that she is having pain with bilateral shoulders. Patient states that when she sews or crochets that she has increase her pain. Patient states daily house hold chores and ADLs.     7/23/2020  presented for sooner appointment due to worsening low back pain. Patient is scheduled for TLIF L3/L4 and L4/L5 on 8/5/2020. Patient has under LESI with some relief in symptoms but pain has gotten to where it is significantly affecting patient's ability to do ADLs and have quality of life. Patient's radicular pain is significantly worse and patient is having significant muscle spasms. Patient is very sensitive to medications and she has been hestitant to take medication for radicular pain. However, the pain has gotten severe enough she is willing to try. 5/4/2020  presents to the office for sooner office visit. Patient had right SI, right greater trochanteric bursa and right piriformis injected on 3/11/2020. Patient did have an improvement in her low back pain until 4/27/2020. Patient called the nurse line stating that she had an increase in pain and inflammation. She knew it was too soon for a repeat injection was wondering if there is anything that could be done. Patient requested a Medrol Dosepak. Patient was given Medrol Dosepak over the phone and instructed to call if it did not improve. Patient stated that it improved for a short period of time however walking was making the pain worse. She states that she hurts across lower back and then down bilateral lower extremities straight back to the foot.   Patient states that she has noticed that she trips a lot with her right foot. She states that she feels like she is leaning and that she cannot stand straight when ambulating. Patient has known instability in low back and had wanted to postpone low back surgery. However patient is stating that she cannot take the pain any longer and she is wanting something to be done to help relieve the pain. Patient does appear to be in pain. Patient did become tearful during exam.  Patient is usually upbeat and . Patient states that pain is affecting her quality of life. 12/2019  presents to the office for procedure follow-up and office visit. Patient had right SI and right trochanteric bursa injection with ultrasound and right piriformis on 10/23/2019. Patient sustained 50% relief for approximately 1 month before diminishing of pain. Patient stated that the injection targeted the area of pain. Patient requested sooner appointment due to increase in low back pain. Patient had called provider at home regarding acute low back pain with numbness to right lower extremity that had started suddenly on Saturday. 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 Patient stated that she had done fine on Friday and then on Saturday she just had and a sudden acute onset of low back pain that was gripping. At the time pain medication was not controlling her pain. Patient stated that she started to have numbness to her right foot that is intermittent and that when she is having the numbness that she feels as if she may fall. Patient rated her pain a 10 out of a 10 she described it is a stabbing in the low back. Patient states that sitting on the toilet will cause an increase in the radicular pain. Patient states that she has to lean on the shopping cart when she is ambulating to help with the low back pain.   Patient states that cleaning such as sweeping mopping or doing anything side to side significantly increases low back pain. MRI was ordered of the lumbar spine. Patient does have instability that was on x-ray of the lumbar spine. However at the time the x-ray was taken patient was not having any type of radicular or nerve symptoms. 10/3/19  presents with referral from [address] [place] [address] with primary complaints of chronic low back pain. Pain has been a gradual onset that started around 2006. Pain is aggravated by lifting bending and walking. Patient had imaging done by Dr. Roberto Freire in [facility] which indicated she had 2 bulging disks. Per patient she was told that she had 2 dead disks and 2 bulging disks in the lumbar region. She was told that the hip pain was deferred from the bulging disks and this is progressively worsened over the past 12 years. Patient states the pain is now constant and causes a limp. Patient has been tried on several nerve medications doxepin caused patient to sleepwalk, gabapentin caused patient to lose time, and amitriptyline made her feel as if her blood was boiling. Patient is tried massage therapy but this causes her to become more tense. Aggravating factors include ambulation prolonged sitting prolonged standing. Patient states that she does not hurt in the morning but it progresses as the day goes on. Other areas of pain that will be addressed at a later time is bilateral shoulder pain, hip pain and left knee pain.      Of note: Patient has numerous medical allergies penicillin causes hives, erythromycin causes hives, doxepin causes sleepwalking, gabapentin causes loss of memory, amitriptyline makes her feel like her blood boiling, Daypro eyes swelled shut, Soma horrible headaches, Percocet nightmares, Demerol nausea, statins muscle spasms, meloxicam irritates stomach ulcers, citalopram causes memory loss, menthol and camphor causes hives    Employment: retired     Past Medical History  Past Medical History:   Diagnosis Date    Anxiety     Asthma Back pain     Colon polyps     DDD (degenerative disc disease), cervical     Depression     Essential hypertension 10/19/2016    GERD (gastroesophageal reflux disease)     Hiatal hernia     HTN (hypertension)     Post-menopausal     Shoulder pain     Tobacco abuse 10/19/2016       Surgery History  Past Surgical History:   Procedure Laterality Date    CHOLECYSTECTOMY      GALLBLADDER SURGERY      HERNIA REPAIR      JOINT REPLACEMENT Right     LAP BAND  08/28/2009    LUMBAR FUSION  2020    LUMBAR SPINE SURGERY N/A 8/5/2020    TLIF L3-4, L4-5 performed by Stormy Kwon DO at [address] History  family history includes Alzheimer's Disease in her mother; Parkinsonism in her mother; Stroke in her father; Substance Abuse in her brother. Social History    Social History     Tobacco Use    Smoking status: Every Day     Packs/day: 1.00     Types: Cigarettes     Start date: 10/19/1988    Smokeless tobacco: Never   Substance Use Topics    Alcohol use: Not Currently     Comment: rare       Allergies  Daypro [oxaprozin], Doxepin, Gabapentin, Lyrica [pregabalin], Penicillins, Elavil [amitriptyline hcl], Erythromycin, Meloxicam, Percocet [oxycodone-acetaminophen], Soma [carisoprodol], Citalopram, Demerol hcl [meperidine], and Statins     Current Medications  Current Outpatient Medications   Medication Sig Dispense Refill    sertraline (ZOLOFT) 25 MG tablet Take 1 tablet by mouth daily      celecoxib (CELEBREX) 100 MG capsule Take 1 capsule by mouth 2 times daily 60 capsule 2    HYDROcodone-acetaminophen (NORCO)  MG per tablet Take 1 tablet by mouth every 8 hours as needed for Pain for up to 30 days.  90 tablet 0    methocarbamol (ROBAXIN-750) 750 MG tablet Take 1 tablet by mouth 4 times daily as needed (muscle spasm) 120 tablet 5    lisinopril (PRINIVIL;ZESTRIL) 10 MG tablet Take 10 mg by mouth daily      sertraline (ZOLOFT) 50 MG tablet Take 50 mg by mouth daily      hydroCHLOROthiazide (HYDRODIURIL) 25 MG tablet Take 25 mg by mouth daily      ondansetron (ZOFRAN) 4 MG tablet Take 4 mg by mouth      omeprazole (PRILOSEC) 40 MG delayed release capsule Take 40 mg by mouth daily      aspirin 81 MG chewable tablet Take 81 mg by mouth daily      ALPRAZolam (XANAX) 1 MG tablet Take 1 mg by mouth nightly as needed for Sleep.      acetaminophen (TYLENOL) 325 MG tablet Take 325 mg by mouth every 6 hours as needed for Pain       diphenhydrAMINE HCl (BENADRYL PO) Take 25 mg by mouth daily as needed       clotrimazole (LOTRIMIN) 1 % cream Apply topically as needed Apply topically 2 times daily. famotidine (PEPCID) 20 MG tablet Take 20 mg by mouth 2 times daily      mupirocin (BACTROBAN) 2 % ointment Apply topically as needed Apply topically 3 times daily. nystatin (MYCOSTATIN) 882808 UNIT/GM cream Apply topically as needed Apply topically 2 times daily. No current facility-administered medications for this encounter. Review of Systems:  Review of Systems   Constitutional:  Positive for activity change. Gastrointestinal:  Negative for bowel incontinence and constipation. Genitourinary:  Negative for bladder incontinence. Musculoskeletal:  Positive for arthralgias, back pain, gait problem, joint swelling and myalgias. Neurological:  Positive for tingling (improved since surgery). Psychiatric/Behavioral:  Positive for sleep disturbance. Negative for agitation, self-injury and suicidal ideas. The patient is not nervous/anxious. 14 point ROS negative besides that noted in HPI    Physical Exam:    Vitals:    10/18/22 1614   BP: 110/60   Pulse: 59   Resp: 16   Temp: 97.4 °F (36.3 °C)   TempSrc: Temporal   SpO2: 95%   Weight: 200 lb (90.7 kg)   Height: 5' 5\" (1.651 m)       Body mass index is 33.28 kg/m². Physical Exam  Vitals and nursing note reviewed. Constitutional:       General: She is not in acute distress. Appearance: She is well-developed. HENT:      Head: Normocephalic. Right Ear: External ear normal.      Left Ear: External ear normal.      Nose: Nose normal.   Eyes:      Conjunctiva/sclera: Conjunctivae normal.      Pupils: Pupils are equal, round, and reactive to light. Neck:      Vascular: No JVD. Trachea: No tracheal deviation. Cardiovascular:      Rate and Rhythm: Normal rate. Pulmonary:      Effort: Pulmonary effort is normal.   Abdominal:      General: There is no distension. Tenderness: There is no abdominal tenderness. Musculoskeletal:      Right shoulder: Tenderness and bony tenderness present. Decreased range of motion. Left shoulder: Tenderness and bony tenderness present. Decreased range of motion. Lumbar back: Spasms and tenderness present. Right knee: Tenderness present. Left knee: Bony tenderness present. Decreased range of motion. Tenderness present over the medial joint line. Comments: + empty can, unable to reach behind back, grimaces with pain during assessment and rom   Skin:     General: Skin is warm and dry. Neurological:      Mental Status: She is alert and oriented to person, place, and time. Cranial Nerves: No cranial nerve deficit. Psychiatric:         Behavior: Behavior normal.         Thought Content:  Thought content normal.         Judgment: Judgment normal.     Labs:  Lab Results   Component Value Date/Time     01/27/2021 02:57 AM    K 4.0 01/27/2021 02:57 AM     01/27/2021 02:57 AM    CO2 28 01/27/2021 02:57 AM    BUN 16 01/27/2021 02:57 AM    CREATININE 0.6 01/27/2021 02:57 AM    GLUCOSE 103 01/27/2021 02:57 AM    CALCIUM 8.9 01/27/2021 02:57 AM        Lab Results   Component Value Date    WBC 10.0 01/27/2021    HGB 11.2 (L) 01/27/2021    HCT 36.5 (L) 01/27/2021    MCV 97.3 01/27/2021     01/27/2021       Assessment:  Active Problems:    Tibial fracture    Arthritis of carpometacarpal (CMC) joint of right thumb    Chronic bilateral low back pain with bilateral sciatica    SI (sacroiliac) joint dysfunction    Piriformis syndrome of right side    Chronic pain of both shoulders    Trochanteric bursitis of right hip    Anterolisthesis    Spondylolisthesis at L3-L4 level    Pain medication agreement    Chronic pain of left knee    Primary osteoarthritis of both knees  Resolved Problems:    * No resolved hospital problems. *      Plan:  Arthritis of knee  Continue medication with refill sent at appointment see refill encounter dated 10/18/2022  - celecoxib (CELEBREX) 100 MG capsule; Take 1 capsule by mouth 2 times daily  Dispense: 60 capsule; Refill: 2    Continue medication with refill sent at appointment see refill encounter dated 10/18/2022  Robaxin 750 mg 4 times a day as needed for myofascial pain    Continue medication with refill sent at appointment see refill encounter dated 10/18/2022  Norco 10 mg every  8 hours prn #90     Patient may call for  right SI joint injection with ultrasound with NP as patient had positive provocative measures. Patient may call for Left knee injection with zilretta. Patient may call for Right CMC injection due to arthritis. [x] Follow up    [] 4 weeks   [] 6-8 weeks   [] 10-12 weeks   [x] 3 months  [] Post procedure to evaluate effectiveness of treatment  [] To evaluate medications changes made at office visit. [] To review diagnostics ordered at last visit. [] To evaluate response to therapy    [x] For management of controlled substance  [x] Random UDS as indicated by ORT score or if indicated by abberent behaviors    Discussion: Discussed exam findings and plan of care with patient. Patient agreed with POC and questions were asked and answered. Activity: Discussed exercise as beneficial to pain reduction, encouraged daily stretching with a focus on torso strengthening.     Goal:  Pain Management Goals of Therapy:   [] Resolution in pain  [x] Decrease in pain level  [x] Improvement in ADL's  [x] Increase in activities with less pain  [] Decrease in medication      Controlled substance monitoring:    [x] Discussed medication side effects, risk of tolerance and/or dependence, and/or alternative treatment  [] Discussed the detrimental effects of long term narcotic use in younger patients especially women of childbearing years. [x] No signs and symptoms of potential drug abuse or diversion were identified  [] Signs of potential drug abuse or diversion were identified   [] ORT Score   [] UDS non-compliant   [] See Note  [] Random urine drug screen sent today  [x] Random urine drug screen not completed today   [] Deferred New Patient  [x] Compliant  6/28/2022  [] Not Compliant see note  [] Medication agreement with provider signed today  [x] Medication agreement with provider on file under media   [x] Medication regimen effective and continued   [] New patient continuing current medication while developing POC   [] On going assessment and evaluation of medication regimen  [] Medication regimen not effective see plan for changes  [x] Pascale Cueva reviewed & on file under media       CC:  DAYSI Cruz - CNP, 10/18/2022 at 4:55 PM    EMR dragon/transcription disclaimer: Much of this encounter note is electronic transcription/translation of spoken language to printed tach. Electronic translation of spoken language may be erroneous, or at times, nonsensical words or phrases may be inadvertently transcribed.  Although, I have reviewed the note for such errors, some may still exist.

## 2022-10-18 NOTE — PROGRESS NOTES
Clinic Documentation      Education Provided:  [x] Review of Vijay Dural  [] Agreement Review  [x] PEG Score Calculated [] PHQ Score Calculated [] ORT Score Calculated    [] Compliance Issues Discussed [] Cognitive Behavior Needs [x] Exercise [] Review of Test [] Financial Issues  [x] Tobacco/Alcohol Use Reviewed [x] Teaching [] New Patient [] Picture Obtained    Physician Plan:  [] Outgoing Referral  [] Pharmacy Consult  [] Test Ordered [x] Prescription Ordered/Changed   [] Obtained Test Results / Consult Notes        Complete if patient is withholding blood thinner for procedure     Blood Thinner Patient is currently taking:      [] Plavix (Hold for 7 days)  [] Aspirin (Hold for 5 days)     [] Pletal (Hold for 2 days)  [] Pradaxa (Hold for 3 days)    [] Effient (Hold for 7 days)  [] Xarelto (Hold for 2 days)    [] Eliquis (Hold for 2 days)  [] Brilinta (Hold for 7 days)    [] Coumadin (Hold for 5 days) - (INR needs to be drawn the day prior to procedure- INR < 2.0)    [] Aggrenox (Hold for 7 days)        [] Patient will stop medication on their own.    [] Blood Thinner Form Faxed for approval to hold.    Provider form faxed to:     Assessment Completed by:  Electronically signed by Shon Lechuga RN on 10/18/2022 at 5:15 PM

## 2022-11-27 ENCOUNTER — TELEPHONE (OUTPATIENT)
Dept: PAIN MANAGEMENT | Age: 57
End: 2022-11-27

## 2022-11-27 PROBLEM — M17.12 PRIMARY OSTEOARTHRITIS OF LEFT KNEE: Status: ACTIVE | Noted: 2021-09-09

## 2022-11-27 PROBLEM — M43.10 ANTEROLISTHESIS: Status: RESOLVED | Noted: 2020-02-17 | Resolved: 2022-11-27

## 2022-11-27 PROBLEM — S82.209A TIBIAL FRACTURE: Status: RESOLVED | Noted: 2021-01-26 | Resolved: 2022-11-27

## 2022-11-27 PROBLEM — Z96.651 HX OF TOTAL KNEE ARTHROPLASTY, RIGHT: Status: ACTIVE | Noted: 2022-11-27

## 2022-11-27 PROBLEM — Z98.1 HISTORY OF LUMBAR FUSION: Status: ACTIVE | Noted: 2022-11-27

## 2022-11-27 NOTE — TELEPHONE ENCOUNTER
11/21/2022 @ 13:57  Received text message from patient stating that she had fallen in her kitchen and went to the ER on Saturday, November 19. Patient had a left radial wrist fracture. She was scheduled to see Dr. Isabelle Ramirez the following morning of this message. When patient was in the emergency room she was not given any additional pain medication and was informed to contact pain management office on Monday. Was instructed to increase pain medication to 1 to 2 tablets every 6 hours until she sees Dr. Isabelle Ramirez. Patient was obtain pain medication from Dr. Isabelle Ramirez if he chose to prescribe medication. Patient sent provider text on Wednesday, 11/23/2022 at 11:25 AM stating that she was home from surgery on left wrist.  She stated that she was given medication from Dr. Isabelle Ramirez that she was putting pain management medication on hold and taking medication from Dr. Isabelle Ramirez as prescribed. Medication refill's will be adjusted as needed according to Nilda  report.      Electronically signed by DAYSI Garcia CNP on 11/27/2022 at 1:08 PM

## 2022-11-29 DIAGNOSIS — M43.16 SPONDYLOLISTHESIS AT L3-L4 LEVEL: ICD-10-CM

## 2022-11-29 DIAGNOSIS — M25.512 CHRONIC PAIN OF BOTH SHOULDERS: ICD-10-CM

## 2022-11-29 DIAGNOSIS — M25.511 CHRONIC PAIN OF BOTH SHOULDERS: ICD-10-CM

## 2022-11-29 DIAGNOSIS — G89.29 CHRONIC PAIN OF BOTH SHOULDERS: ICD-10-CM

## 2022-11-29 NOTE — TELEPHONE ENCOUNTER
Added 10 days to refill date for norco per provider. Patient received percocet 5mg #40 from Dr. Iftikhar Trejo on 11/23/22.

## 2022-12-01 RX ORDER — HYDROCODONE BITARTRATE AND ACETAMINOPHEN 10; 325 MG/1; MG/1
1 TABLET ORAL EVERY 8 HOURS PRN
Qty: 90 TABLET | Refills: 0 | Status: SHIPPED | OUTPATIENT
Start: 2022-12-13 | End: 2023-01-12

## 2022-12-05 ENCOUNTER — TELEPHONE (OUTPATIENT)
Dept: PAIN MANAGEMENT | Age: 57
End: 2022-12-05

## 2022-12-05 NOTE — TELEPHONE ENCOUNTER
Patient called with question about her medication refill and strength.   I have sent to [facility] Lopez for review

## 2022-12-06 DIAGNOSIS — M25.512 CHRONIC PAIN OF BOTH SHOULDERS: ICD-10-CM

## 2022-12-06 DIAGNOSIS — M25.511 CHRONIC PAIN OF BOTH SHOULDERS: ICD-10-CM

## 2022-12-06 DIAGNOSIS — G89.29 CHRONIC PAIN OF BOTH SHOULDERS: ICD-10-CM

## 2022-12-06 DIAGNOSIS — M43.16 SPONDYLOLISTHESIS AT L3-L4 LEVEL: ICD-10-CM

## 2022-12-06 RX ORDER — HYDROCODONE BITARTRATE AND ACETAMINOPHEN 10; 325 MG/1; MG/1
1 TABLET ORAL EVERY 6 HOURS PRN
Qty: 120 TABLET | Refills: 0 | Status: SHIPPED | OUTPATIENT
Start: 2022-12-13 | End: 2023-01-12

## 2022-12-07 ENCOUNTER — TELEPHONE (OUTPATIENT)
Dept: PAIN MANAGEMENT | Age: 57
End: 2022-12-07

## 2022-12-07 NOTE — TELEPHONE ENCOUNTER
Call to patient to let her know that Bianca Bergman will be increasing the frequency of her pain medication to every four hours. I let her know that a refill script has been sent to her pharmacy with the new increase. Patient verbalized understanding.

## 2023-01-23 DIAGNOSIS — M25.512 CHRONIC PAIN OF BOTH SHOULDERS: ICD-10-CM

## 2023-01-23 DIAGNOSIS — M25.511 CHRONIC PAIN OF BOTH SHOULDERS: ICD-10-CM

## 2023-01-23 DIAGNOSIS — G89.29 CHRONIC PAIN OF BOTH SHOULDERS: ICD-10-CM

## 2023-01-23 DIAGNOSIS — M43.16 SPONDYLOLISTHESIS AT L3-L4 LEVEL: ICD-10-CM

## 2023-01-23 RX ORDER — HYDROCODONE BITARTRATE AND ACETAMINOPHEN 10; 325 MG/1; MG/1
1 TABLET ORAL EVERY 6 HOURS PRN
Qty: 120 TABLET | Refills: 0 | Status: SHIPPED | OUTPATIENT
Start: 2023-01-23 | End: 2023-02-22

## 2023-03-01 DIAGNOSIS — M25.512 CHRONIC PAIN OF BOTH SHOULDERS: ICD-10-CM

## 2023-03-01 DIAGNOSIS — G89.29 CHRONIC PAIN OF BOTH SHOULDERS: ICD-10-CM

## 2023-03-01 DIAGNOSIS — M43.16 SPONDYLOLISTHESIS AT L3-L4 LEVEL: ICD-10-CM

## 2023-03-01 DIAGNOSIS — M25.511 CHRONIC PAIN OF BOTH SHOULDERS: ICD-10-CM

## 2023-03-01 RX ORDER — HYDROCODONE BITARTRATE AND ACETAMINOPHEN 10; 325 MG/1; MG/1
1 TABLET ORAL EVERY 6 HOURS PRN
Qty: 120 TABLET | Refills: 0 | Status: SHIPPED | OUTPATIENT
Start: 2023-03-01 | End: 2023-03-31

## 2023-03-08 DIAGNOSIS — G57.01 PIRIFORMIS SYNDROME OF RIGHT SIDE: ICD-10-CM

## 2023-03-08 DIAGNOSIS — M43.10 ANTEROLISTHESIS: ICD-10-CM

## 2023-03-08 RX ORDER — METHOCARBAMOL 750 MG/1
750 TABLET, FILM COATED ORAL 4 TIMES DAILY PRN
Qty: 120 TABLET | Refills: 2 | Status: SHIPPED | OUTPATIENT
Start: 2023-04-16 | End: 2023-10-13

## 2023-03-08 NOTE — TELEPHONE ENCOUNTER
Returned patient's call from this morning. Attempted PA on hydrocodone and insurance stated that prior authorization in NOT required. Left this information on her voicemail. Also her methocarbamol should have a refill left. We are sending a new script to fill in April.

## 2023-05-01 DIAGNOSIS — G89.29 CHRONIC PAIN OF BOTH SHOULDERS: ICD-10-CM

## 2023-05-01 DIAGNOSIS — M25.511 CHRONIC PAIN OF BOTH SHOULDERS: ICD-10-CM

## 2023-05-01 DIAGNOSIS — M17.10 ARTHRITIS OF KNEE: ICD-10-CM

## 2023-05-01 DIAGNOSIS — M43.16 SPONDYLOLISTHESIS AT L3-L4 LEVEL: ICD-10-CM

## 2023-05-01 DIAGNOSIS — M25.512 CHRONIC PAIN OF BOTH SHOULDERS: ICD-10-CM

## 2023-05-01 RX ORDER — CELECOXIB 100 MG/1
100 CAPSULE ORAL 2 TIMES DAILY
Qty: 60 CAPSULE | Refills: 5 | Status: CANCELLED | OUTPATIENT
Start: 2023-05-01

## 2023-05-01 RX ORDER — HYDROCODONE BITARTRATE AND ACETAMINOPHEN 10; 325 MG/1; MG/1
1 TABLET ORAL EVERY 6 HOURS PRN
Qty: 120 TABLET | Refills: 0 | Status: CANCELLED | OUTPATIENT
Start: 2023-05-01 | End: 2023-05-31

## 2023-05-03 DIAGNOSIS — M25.511 CHRONIC PAIN OF BOTH SHOULDERS: ICD-10-CM

## 2023-05-03 DIAGNOSIS — M43.16 SPONDYLOLISTHESIS AT L3-L4 LEVEL: ICD-10-CM

## 2023-05-03 DIAGNOSIS — M17.10 ARTHRITIS OF KNEE: ICD-10-CM

## 2023-05-03 DIAGNOSIS — M25.512 CHRONIC PAIN OF BOTH SHOULDERS: ICD-10-CM

## 2023-05-03 DIAGNOSIS — G89.29 CHRONIC PAIN OF BOTH SHOULDERS: ICD-10-CM

## 2023-05-03 RX ORDER — CELECOXIB 100 MG/1
100 CAPSULE ORAL 2 TIMES DAILY
Qty: 60 CAPSULE | Refills: 5 | Status: SHIPPED | OUTPATIENT
Start: 2023-05-03

## 2023-05-03 RX ORDER — HYDROCODONE BITARTRATE AND ACETAMINOPHEN 10; 325 MG/1; MG/1
1 TABLET ORAL EVERY 6 HOURS PRN
Qty: 120 TABLET | Refills: 0 | Status: SHIPPED | OUTPATIENT
Start: 2023-05-03 | End: 2023-06-02

## 2023-05-08 ENCOUNTER — HOSPITAL ENCOUNTER (OUTPATIENT)
Dept: PAIN MANAGEMENT | Age: 58
Discharge: HOME | End: 2023-05-08
Payer: COMMERCIAL

## 2023-05-08 VITALS
OXYGEN SATURATION: 96 % | WEIGHT: 219 LBS | DIASTOLIC BLOOD PRESSURE: 75 MMHG | HEIGHT: 65 IN | BODY MASS INDEX: 36.49 KG/M2 | RESPIRATION RATE: 18 BRPM | SYSTOLIC BLOOD PRESSURE: 114 MMHG | HEART RATE: 72 BPM | TEMPERATURE: 96.9 F

## 2023-05-08 DIAGNOSIS — M43.16 SPONDYLOLISTHESIS AT L3-L4 LEVEL: ICD-10-CM

## 2023-05-08 DIAGNOSIS — M25.511 CHRONIC PAIN OF BOTH SHOULDERS: ICD-10-CM

## 2023-05-08 DIAGNOSIS — G89.29 CHRONIC PAIN OF BOTH SHOULDERS: ICD-10-CM

## 2023-05-08 DIAGNOSIS — M25.512 CHRONIC PAIN OF BOTH SHOULDERS: ICD-10-CM

## 2023-05-08 PROCEDURE — 99215 OFFICE O/P EST HI 40 MIN: CPT

## 2023-05-08 PROCEDURE — 99213 OFFICE O/P EST LOW 20 MIN: CPT | Performed by: NURSE PRACTITIONER

## 2023-05-08 RX ORDER — HYDROCODONE BITARTRATE AND ACETAMINOPHEN 10; 325 MG/1; MG/1
1 TABLET ORAL EVERY 6 HOURS PRN
Qty: 120 TABLET | Refills: 0 | Status: SHIPPED | OUTPATIENT
Start: 2023-05-08 | End: 2023-05-08 | Stop reason: SDUPTHER

## 2023-05-08 RX ORDER — SERTRALINE HYDROCHLORIDE 100 MG/1
TABLET, FILM COATED ORAL
COMMUNITY
Start: 2023-05-01

## 2023-05-08 RX ORDER — NALOXONE HYDROCHLORIDE 4 MG/.1ML
SPRAY NASAL
COMMUNITY
Start: 2023-03-13

## 2023-05-08 RX ORDER — ALENDRONATE SODIUM 70 MG/1
TABLET ORAL
COMMUNITY
Start: 2023-03-20

## 2023-05-08 ASSESSMENT — PAIN DESCRIPTION - INTENSITY
RATING_3: 7
RATING_2: 3

## 2023-05-08 ASSESSMENT — PAIN DESCRIPTION - LOCATION
LOCATION: BACK
LOCATION_3: SHOULDER
LOCATION_2: KNEE

## 2023-05-08 ASSESSMENT — PAIN SCALES - GENERAL: PAINLEVEL_OUTOF10: 4

## 2023-05-08 ASSESSMENT — PAIN DESCRIPTION - ORIENTATION
ORIENTATION_3: RIGHT
ORIENTATION_2: RIGHT
ORIENTATION: LOWER

## 2023-05-08 ASSESSMENT — ENCOUNTER SYMPTOMS
BACK PAIN: 1
BOWEL INCONTINENCE: 0
CONSTIPATION: 0

## 2023-05-08 ASSESSMENT — PAIN DESCRIPTION - PAIN TYPE: TYPE: CHRONIC PAIN

## 2023-05-08 NOTE — PROGRESS NOTES
Clinic Documentation      Education Provided:  [x] Review of Emaline Gist  [x] Agreement Review  [x] PEG Score Calculated [x] PHQ Score Calculated [x] ORT Score Calculated    [] Compliance Issues Discussed [] Cognitive Behavior Needs [x] Exercise [] Review of Test [] Financial Issues  [x] Tobacco/Alcohol Use Reviewed [x] Teaching [] New Patient [] Picture Obtained    Physician Plan:  [] Outgoing Referral  [] Pharmacy Consult  [x] Test Ordered [x] Prescription Ordered/Changed   [] Obtained Test Results / Consult Notes        Complete if patient is withholding blood thinner for procedure     Blood Thinner Patient is currently taking:      [] Plavix (Hold for 7 days)  [] Aspirin (Hold for 5 days)     [] Pletal (Hold for 2 days)  [] Pradaxa (Hold for 3 days)    [] Effient (Hold for 7 days)  [] Xarelto (Hold for 2 days)    [] Eliquis (Hold for 2 days)  [] Brilinta (Hold for 7 days)    [] Coumadin (Hold for 5 days) - (INR needs to be drawn the day prior to procedure- INR < 2.0)    [] Aggrenox (Hold for 7 days)        [] Patient will stop medication on their own.    [] Blood Thinner Form Faxed for approval to hold.    Provider form faxed to:     Assessment Completed by:  Electronically signed by Jennifer Barksdale RN on 5/8/2023 at 4:52 PM
Pain agreement reviewed with patient and signed per patient. No questions voiced at the present and patient states understanding of agreement. Copy given to patient.
right    History of lumbar fusion    Chronic bilateral low back pain with bilateral sciatica    SI (sacroiliac) joint dysfunction    Piriformis syndrome of right side    Chronic pain of both shoulders    Trochanteric bursitis of right hip    Spondylolisthesis at L3-L4 level    Pain medication agreement    History of tibial fracture    Primary osteoarthritis of left knee  Resolved Problems:    * No resolved hospital problems. *      Plan:  Arthritis of knee    - celecoxib (CELEBREX) 100 MG capsule; Take 1 capsule by mouth 2 times daily  Dispense: 60 capsule; Refill: 2    Robaxin 750 mg 4 times a day as needed for myofascial pain      Norco 10 mg every 6 hours prn #120     Patient may call for  right SI joint injection with ultrasound with NP as patient had positive provocative measures. Patient may call for Left knee injection with zilretta. Patient may call for Bilateral CMC injection due to arthritis. [x] Follow up    [] 4 weeks   [] 6-8 weeks   [] 10-12 weeks   [x] 3 months  [] Post procedure to evaluate effectiveness of treatment  [] To evaluate medications changes made at office visit. [] To review diagnostics ordered at last visit. [] To evaluate response to therapy    [x] For management of controlled substance  [x] Random UDS as indicated by ORT score or if indicated by abberent behaviors    Discussion: Discussed exam findings and plan of care with patient. Patient agreed with POC and questions were asked and answered. Activity: Discussed exercise as beneficial to pain reduction, encouraged daily stretching with a focus on torso strengthening.     Goal:  Pain Management Goals of Therapy:   [] Resolution in pain  [x] Decrease in pain level  [x] Improvement in ADL's  [x] Increase in activities with less pain  [] Decrease in medication      Controlled substance monitoring:    [x] Discussed medication side effects, risk of tolerance and/or dependence, and/or alternative treatment  [] Discussed

## 2023-06-20 DIAGNOSIS — M25.512 CHRONIC PAIN OF BOTH SHOULDERS: ICD-10-CM

## 2023-06-20 DIAGNOSIS — G89.29 CHRONIC PAIN OF BOTH SHOULDERS: ICD-10-CM

## 2023-06-20 DIAGNOSIS — M43.16 SPONDYLOLISTHESIS AT L3-L4 LEVEL: ICD-10-CM

## 2023-06-20 DIAGNOSIS — M25.511 CHRONIC PAIN OF BOTH SHOULDERS: ICD-10-CM

## 2023-06-20 DIAGNOSIS — M17.10 ARTHRITIS OF KNEE: ICD-10-CM

## 2023-06-21 RX ORDER — HYDROCODONE BITARTRATE AND ACETAMINOPHEN 10; 325 MG/1; MG/1
1 TABLET ORAL EVERY 6 HOURS PRN
Qty: 120 TABLET | Refills: 0 | Status: SHIPPED | OUTPATIENT
Start: 2023-06-21 | End: 2023-07-21

## 2023-06-27 ENCOUNTER — TELEPHONE (OUTPATIENT)
Dept: NEUROSURGERY | Age: 58
End: 2023-06-27

## 2023-07-17 DIAGNOSIS — M25.511 CHRONIC PAIN OF BOTH SHOULDERS: ICD-10-CM

## 2023-07-17 DIAGNOSIS — G57.01 PIRIFORMIS SYNDROME OF RIGHT SIDE: ICD-10-CM

## 2023-07-17 DIAGNOSIS — M43.10 ANTEROLISTHESIS: ICD-10-CM

## 2023-07-17 DIAGNOSIS — M43.16 SPONDYLOLISTHESIS AT L3-L4 LEVEL: ICD-10-CM

## 2023-07-17 DIAGNOSIS — M25.512 CHRONIC PAIN OF BOTH SHOULDERS: ICD-10-CM

## 2023-07-17 DIAGNOSIS — G89.29 CHRONIC PAIN OF BOTH SHOULDERS: ICD-10-CM

## 2023-07-17 RX ORDER — METHOCARBAMOL 750 MG/1
750 TABLET, FILM COATED ORAL 4 TIMES DAILY PRN
Qty: 120 TABLET | Refills: 2 | Status: SHIPPED | OUTPATIENT
Start: 2023-07-17 | End: 2024-01-13

## 2023-07-17 RX ORDER — HYDROCODONE BITARTRATE AND ACETAMINOPHEN 10; 325 MG/1; MG/1
1 TABLET ORAL EVERY 6 HOURS PRN
Qty: 120 TABLET | Refills: 0 | Status: SHIPPED | OUTPATIENT
Start: 2023-07-21 | End: 2023-08-20

## 2023-07-20 ENCOUNTER — HOSPITAL ENCOUNTER (OUTPATIENT)
Dept: PAIN MANAGEMENT | Age: 58
Discharge: HOME | End: 2023-07-20
Payer: COMMERCIAL

## 2023-07-20 VITALS
WEIGHT: 226 LBS | DIASTOLIC BLOOD PRESSURE: 80 MMHG | HEIGHT: 65 IN | HEART RATE: 70 BPM | SYSTOLIC BLOOD PRESSURE: 139 MMHG | TEMPERATURE: 97.6 F | RESPIRATION RATE: 16 BRPM | BODY MASS INDEX: 37.65 KG/M2 | OXYGEN SATURATION: 94 %

## 2023-07-20 DIAGNOSIS — Z98.1 HISTORY OF LUMBAR FUSION: ICD-10-CM

## 2023-07-20 DIAGNOSIS — L02.215 PERINEAL ABSCESS: ICD-10-CM

## 2023-07-20 DIAGNOSIS — M53.3 SI (SACROILIAC) JOINT DYSFUNCTION: Primary | ICD-10-CM

## 2023-07-20 DIAGNOSIS — Z96.651 HX OF TOTAL KNEE ARTHROPLASTY, RIGHT: ICD-10-CM

## 2023-07-20 PROCEDURE — 99214 OFFICE O/P EST MOD 30 MIN: CPT

## 2023-07-20 RX ORDER — SULFAMETHOXAZOLE AND TRIMETHOPRIM 800; 160 MG/1; MG/1
1 TABLET ORAL 2 TIMES DAILY
Qty: 14 TABLET | Refills: 0 | Status: SHIPPED | OUTPATIENT
Start: 2023-07-20 | End: 2023-07-27

## 2023-07-20 ASSESSMENT — PAIN DESCRIPTION - INTENSITY
RATING_3: 3
RATING_2: 3

## 2023-07-20 ASSESSMENT — PAIN DESCRIPTION - LOCATION
LOCATION_3: SHOULDER
LOCATION: BACK
LOCATION_2: KNEE

## 2023-07-20 ASSESSMENT — PAIN DESCRIPTION - ORIENTATION
ORIENTATION_3: RIGHT
ORIENTATION: LOWER
ORIENTATION_2: RIGHT

## 2023-07-20 ASSESSMENT — ENCOUNTER SYMPTOMS
CONSTIPATION: 0
BOWEL INCONTINENCE: 0

## 2023-07-20 ASSESSMENT — PAIN SCALES - GENERAL: PAINLEVEL_OUTOF10: 6

## 2023-07-20 ASSESSMENT — PAIN DESCRIPTION - PAIN TYPE: TYPE: CHRONIC PAIN

## 2023-07-20 NOTE — PROGRESS NOTES
Clinic Documentation      Education Provided:  [x] Review of Terri Zuñiga  [] Agreement Review  [x] PEG Score Calculated [] PHQ Score Calculated [] ORT Score Calculated    [] Compliance Issues Discussed [] Cognitive Behavior Needs [x] Exercise [] Review of Test [] Financial Issues  [x] Tobacco/Alcohol Use Reviewed [x] Teaching [] New Patient [] Picture Obtained    Physician Plan:  [x] Outgoing Referral  [] Pharmacy Consult  [] Test Ordered [x] Prescription Ordered/Changed   [] Obtained Test Results / Consult Notes        Complete if patient is withholding blood thinner for procedure     Blood Thinner Patient is currently taking:      [] Plavix (Hold for 7 days)  [] Aspirin (Hold for 5 days)     [] Pletal (Hold for 2 days)  [] Pradaxa (Hold for 3 days)    [] Effient (Hold for 7 days)  [] Xarelto (Hold for 2 days)    [] Eliquis (Hold for 2 days)  [] Brilinta (Hold for 7 days)    [] Coumadin (Hold for 5 days) - (INR needs to be drawn the day prior to procedure- INR < 2.0)    [] Aggrenox (Hold for 7 days)        [] Patient will stop medication on their own.    [] Blood Thinner Form Faxed for approval to hold.    Provider form faxed to:     Assessment Completed by:  Electronically signed by Twila Rivas RN on 7/20/2023 at 9:20 AM

## 2023-07-20 NOTE — PROGRESS NOTES
[facility] Physical & Pain Medicine  Office Note    Patient Name: Jordy Oropeza    MR #: [id number]    Account #: [de-identified]    : 1965    Age: 59 y.o. Sex: female    Date: 2023    PCP: Nayana Hough    Chief Complaint:   Chief Complaint   Patient presents with    Back Pain     6/10    Knee Pain     Right, 3/10    Shoulder Pain     Right, 3/10       History of Present Illness:     Jordy Oropeza is a 59 y.o. female who presents for follow up. Since last visit patient spouse passed away and 7 weeks later patient's mother-in-law passed away. Patient has been doing relatively well under the circumstances. Patient has tried to cut back on pain medication and she is averaging 2 to 3 pills/day. However depending on activity sometimes she needs more. Back Pain  This is a chronic problem. The current episode started more than 1 year ago. The problem occurs constantly. The problem has been waxing and waning since onset. The pain is present in the sacro-iliac, gluteal and lumbar spine. The quality of the pain is described as aching, shooting, cramping and stabbing. Radiates to: BLE. The symptoms are aggravated by sitting, position, standing and twisting (Walking). Associated symptoms include tingling (improved since surgery). Pertinent negatives include no bladder incontinence, bowel incontinence or leg pain (improved since surgery). Knee Pain  This is a chronic problem. The current episode started more than 1 year ago. The problem occurs constantly. The problem has been waxing and waning. Associated symptoms include arthralgias, joint swelling and myalgias. The symptoms are aggravated by bending and walking. Shoulder Pain  This is a chronic problem. The current episode started more than 1 year ago. The problem occurs constantly. The problem has been waxing and waning. Associated symptoms include arthralgias, joint swelling and myalgias.  The symptoms are aggravated by bending (lifting,

## 2023-07-26 ENCOUNTER — HOSPITAL ENCOUNTER (OUTPATIENT)
Dept: PAIN MANAGEMENT | Age: 58
Discharge: HOME | End: 2023-07-26
Payer: COMMERCIAL

## 2023-07-26 VITALS
OXYGEN SATURATION: 96 % | DIASTOLIC BLOOD PRESSURE: 70 MMHG | SYSTOLIC BLOOD PRESSURE: 143 MMHG | HEART RATE: 78 BPM | TEMPERATURE: 96.4 F | RESPIRATION RATE: 18 BRPM

## 2023-07-26 PROCEDURE — 6360000002 HC RX W HCPCS

## 2023-07-26 PROCEDURE — 20611 DRAIN/INJ JOINT/BURSA W/US: CPT

## 2023-07-26 PROCEDURE — 2500000003 HC RX 250 WO HCPCS

## 2023-07-26 RX ORDER — TRIAMCINOLONE ACETONIDE 40 MG/ML
40 INJECTION, SUSPENSION INTRA-ARTICULAR; INTRAMUSCULAR ONCE
Status: DISCONTINUED | OUTPATIENT
Start: 2023-07-26 | End: 2023-07-28 | Stop reason: HOSPADM

## 2023-07-26 RX ORDER — BUPIVACAINE HYDROCHLORIDE 5 MG/ML
1 INJECTION, SOLUTION EPIDURAL; INTRACAUDAL ONCE
Status: DISCONTINUED | OUTPATIENT
Start: 2023-07-26 | End: 2023-07-28 | Stop reason: HOSPADM

## 2023-07-26 RX ORDER — LIDOCAINE HYDROCHLORIDE 10 MG/ML
1 INJECTION, SOLUTION EPIDURAL; INFILTRATION; INTRACAUDAL; PERINEURAL ONCE
Status: DISCONTINUED | OUTPATIENT
Start: 2023-07-26 | End: 2023-07-28 | Stop reason: HOSPADM

## 2023-07-26 RX ORDER — ETHYL CHLORIDE 100 %
AEROSOL, SPRAY (ML) TOPICAL PRN
Status: DISCONTINUED | OUTPATIENT
Start: 2023-07-26 | End: 2023-07-28 | Stop reason: HOSPADM

## 2023-07-26 NOTE — DISCHARGE INSTRUCTIONS
[facility] Physical And Pain Medicine  Post Procedure Discharge Instructions        YOU HAVE HAD THE FOLLOWING PROCEDURE:                                  [] Occipital Nerve Blocks  [] CTS wrist injection(s)  [] Knee Injection(s)         [] Shoulder Injection(s)   [] Elbow Injection(s)     [] Botox Injection  [] Cervical Trigger Point Injections    [] Thoracic Trigger Point Injections    [] Lumbar Trigger Point Injections  [] Piriformis Trigger Point Injections  [x] SI Joint Injection(s)     [] Trochanteric Bursa Injection(s)       [] Ankle Injection(s)   [] Plantar Fasciitis   []  ______________  Injection(s) [] Botox []  Migraines [] Spasticity    YOU HAVE RECEIVED THE FOLLOWING MEDICATIONS IN YOUR INJECTION(s)  [x] Lidocaine [x] Bupivacaine   [] DepoMedrol (steroid) [] Decadron (steroid)  [x]  Kenalog (steroid)   [] Toradol  [] Supartz [] Sun Rota    [] Botox        PATIENT INFORMATION:   You may experience the following symptoms after your procedure. These symptoms are normal and should not cause concern: You may have an increase in your pain. This may last 24 - 48 hours after your procedure. You may have no change in the pain that you had prior to your injection(s). You may have weakness or numbness in your affected extremity. If this occurs, this may last until numbing the medication wears off. REPORT THE FOLLOWING SYMPTOMS TO YOUR DOCTOR:  Redness, swelling or drainage at the injection site(s)  Unusual pain that interferes with your normal activities of daily living. OTHER INSTRUCTIONS:    [x] I will apply ice to the injection site(s) for at least 24 hours after the procedure. I will rotate the ice on for 20 minutes and off for 20 minutes for at least 24 hours. [x] I will not apply heat for at least 48 hours and I will not take a hot bath or shower for at least 24 hours.      [x] I understand that if Lidocaine or Bupivacaine was used in my injection(s) that the injection site(s)

## 2023-07-27 ENCOUNTER — TELEPHONE (OUTPATIENT)
Dept: NEUROSURGERY | Age: 58
End: 2023-07-27

## 2023-07-27 NOTE — TELEPHONE ENCOUNTER
[facility] radiology at [phone] to request imaging for patient, XR L/Pel. Faxed request form to [phone] as well.

## 2023-08-01 ENCOUNTER — OFFICE VISIT (OUTPATIENT)
Dept: NEUROSURGERY | Age: 58
End: 2023-08-01
Payer: COMMERCIAL

## 2023-08-01 VITALS
RESPIRATION RATE: 18 BRPM | WEIGHT: 226 LBS | HEIGHT: 65 IN | DIASTOLIC BLOOD PRESSURE: 85 MMHG | SYSTOLIC BLOOD PRESSURE: 145 MMHG | BODY MASS INDEX: 37.65 KG/M2 | HEART RATE: 77 BPM

## 2023-08-01 DIAGNOSIS — M53.3 SACROILIAC JOINT DYSFUNCTION OF RIGHT SIDE: Primary | ICD-10-CM

## 2023-08-01 PROCEDURE — 3079F DIAST BP 80-89 MM HG: CPT | Performed by: NEUROLOGICAL SURGERY

## 2023-08-01 PROCEDURE — 99203 OFFICE O/P NEW LOW 30 MIN: CPT | Performed by: NEUROLOGICAL SURGERY

## 2023-08-01 PROCEDURE — 3077F SYST BP >= 140 MM HG: CPT | Performed by: NEUROLOGICAL SURGERY

## 2023-08-01 ASSESSMENT — ENCOUNTER SYMPTOMS
GASTROINTESTINAL NEGATIVE: 1
RESPIRATORY NEGATIVE: 1
BACK PAIN: 1
EYES NEGATIVE: 1

## 2023-08-01 NOTE — PROGRESS NOTES
[facility] Neurosurgery  Office Visit        Chief Complaint   Patient presents with    New Patient     Establishing care    Results     Imaging pushed, reports in media    Tailbone Pain     Patient is here to establish for sacroiliac joint pain. She has received PM one injection and helped somewhat. She is starting PT tomorrow at [facility]. HISTORY OF PRESENT ILLNESS:      The patient is a 59 y.o. female who presents for neurosurgical evaluation of right-sided sacroiliac joint dysfunction. She has a longstanding history of back and orthopedic problems and underwent L3-5 TLIF with Dr. Gregory Riggins in 8/2020. She did very well after that surgery with improvement in her chronic back pain and resolution of her radicular leg pain. Since her surgery, she has developed severe right-sided back/buttock/pelvic pain over the right SI joint. When asked to localize the pain, she points to the PSIS on the right (Sree Finger Test). She describes pain that will radiate from her right buttock into her posterior thigh, hip and groin. She follows with Dr. Keena Shin in Orthopedic Surgery in [facility] who has evaluated her hips and replaced her right knee and she has been told that her hips are fine. She states the pain in her back and buttock is worsened with prolonged sitting and she frequently has to shift her weight. She also states that she cannot sleep in bed due to her pain and must sleep in a recliner. She is established with pain management and recently had a right SI injection and reports that this provided ~85% relief of her pain but the injection only lasted ~1 day and then her pain returned. She is scheduled to start PT for right SI dysfunction in [place].        MEDICAL HISTORY:             Past Medical History:   Diagnosis Date    Anxiety     Asthma     Back pain     Colon polyps     DDD (degenerative disc disease), cervical     Depression     Essential hypertension 10/19/2016    GERD (gastroesophageal reflux disease)

## 2023-08-02 ENCOUNTER — TELEPHONE (OUTPATIENT)
Dept: PAIN MANAGEMENT | Age: 58
End: 2023-08-02

## 2023-08-13 PROBLEM — L02.91 ABSCESS: Status: ACTIVE | Noted: 2023-08-13

## 2023-08-13 ASSESSMENT — ENCOUNTER SYMPTOMS: BACK PAIN: 1

## 2023-08-21 DIAGNOSIS — M25.511 CHRONIC PAIN OF BOTH SHOULDERS: ICD-10-CM

## 2023-08-21 DIAGNOSIS — M25.512 CHRONIC PAIN OF BOTH SHOULDERS: ICD-10-CM

## 2023-08-21 DIAGNOSIS — G89.29 CHRONIC PAIN OF BOTH SHOULDERS: ICD-10-CM

## 2023-08-21 DIAGNOSIS — M43.16 SPONDYLOLISTHESIS AT L3-L4 LEVEL: ICD-10-CM

## 2023-08-23 RX ORDER — HYDROCODONE BITARTRATE AND ACETAMINOPHEN 10; 325 MG/1; MG/1
1 TABLET ORAL EVERY 6 HOURS PRN
Qty: 120 TABLET | Refills: 0 | Status: SHIPPED | OUTPATIENT
Start: 2023-08-26 | End: 2023-09-25

## 2023-09-13 DIAGNOSIS — G89.29 CHRONIC PAIN OF BOTH SHOULDERS: ICD-10-CM

## 2023-09-13 DIAGNOSIS — M17.10 ARTHRITIS OF KNEE: ICD-10-CM

## 2023-09-13 DIAGNOSIS — M25.512 CHRONIC PAIN OF BOTH SHOULDERS: ICD-10-CM

## 2023-09-13 DIAGNOSIS — M25.511 CHRONIC PAIN OF BOTH SHOULDERS: ICD-10-CM

## 2023-09-13 DIAGNOSIS — G57.01 PIRIFORMIS SYNDROME OF RIGHT SIDE: ICD-10-CM

## 2023-09-13 DIAGNOSIS — M43.10 ANTEROLISTHESIS: ICD-10-CM

## 2023-09-13 DIAGNOSIS — M43.16 SPONDYLOLISTHESIS AT L3-L4 LEVEL: ICD-10-CM

## 2023-09-13 RX ORDER — CELECOXIB 100 MG/1
100 CAPSULE ORAL 2 TIMES DAILY
Qty: 60 CAPSULE | Refills: 5 | Status: CANCELLED | OUTPATIENT
Start: 2023-09-13

## 2023-09-13 RX ORDER — METHOCARBAMOL 750 MG/1
750 TABLET, FILM COATED ORAL 4 TIMES DAILY PRN
Qty: 120 TABLET | Refills: 2 | Status: CANCELLED | OUTPATIENT
Start: 2023-09-13 | End: 2024-03-11

## 2023-09-14 RX ORDER — HYDROCODONE BITARTRATE AND ACETAMINOPHEN 10; 325 MG/1; MG/1
1 TABLET ORAL EVERY 6 HOURS PRN
Qty: 120 TABLET | Refills: 0 | Status: SHIPPED | OUTPATIENT
Start: 2023-09-27 | End: 2023-10-27

## 2023-09-28 DIAGNOSIS — G89.29 CHRONIC PAIN OF BOTH SHOULDERS: ICD-10-CM

## 2023-09-28 DIAGNOSIS — M25.511 CHRONIC PAIN OF BOTH SHOULDERS: ICD-10-CM

## 2023-09-28 DIAGNOSIS — M43.16 SPONDYLOLISTHESIS AT L3-L4 LEVEL: ICD-10-CM

## 2023-09-28 DIAGNOSIS — M25.512 CHRONIC PAIN OF BOTH SHOULDERS: ICD-10-CM

## 2023-09-28 RX ORDER — HYDROCODONE BITARTRATE AND ACETAMINOPHEN 5; 325 MG/1; MG/1
2 TABLET ORAL EVERY 6 HOURS PRN
Qty: 240 TABLET | Refills: 0 | Status: SHIPPED | OUTPATIENT
Start: 2023-09-28 | End: 2023-10-28

## 2023-10-04 RX ORDER — CELECOXIB 100 MG/1
CAPSULE ORAL
Qty: 60 CAPSULE | Refills: 2 | OUTPATIENT
Start: 2023-10-04

## 2023-10-05 ENCOUNTER — HOSPITAL ENCOUNTER (OUTPATIENT)
Dept: PAIN MANAGEMENT | Age: 58
Discharge: HOME | End: 2023-10-05
Payer: COMMERCIAL

## 2023-10-05 VITALS
BODY MASS INDEX: 38.49 KG/M2 | OXYGEN SATURATION: 96 % | RESPIRATION RATE: 20 BRPM | HEIGHT: 65 IN | WEIGHT: 231 LBS | SYSTOLIC BLOOD PRESSURE: 155 MMHG | TEMPERATURE: 97 F | DIASTOLIC BLOOD PRESSURE: 79 MMHG | HEART RATE: 85 BPM

## 2023-10-05 PROCEDURE — 99213 OFFICE O/P EST LOW 20 MIN: CPT

## 2023-10-05 RX ORDER — SERTRALINE HYDROCHLORIDE 150 MG/1
1 CAPSULE ORAL DAILY
COMMUNITY
Start: 2023-07-25

## 2023-10-05 RX ORDER — ALBUTEROL SULFATE 90 UG/1
AEROSOL, METERED RESPIRATORY (INHALATION)
COMMUNITY
Start: 2023-09-05

## 2023-10-05 ASSESSMENT — PAIN DESCRIPTION - LOCATION
LOCATION_3: SHOULDER
LOCATION_2: KNEE
LOCATION: BACK

## 2023-10-05 ASSESSMENT — PAIN DESCRIPTION - PAIN TYPE: TYPE: CHRONIC PAIN

## 2023-10-05 ASSESSMENT — PAIN SCALES - GENERAL: PAINLEVEL_OUTOF10: 5

## 2023-10-05 ASSESSMENT — ENCOUNTER SYMPTOMS
CONSTIPATION: 0
BOWEL INCONTINENCE: 0

## 2023-10-05 ASSESSMENT — PAIN DESCRIPTION - ORIENTATION
ORIENTATION_3: RIGHT
ORIENTATION_2: RIGHT
ORIENTATION: LOWER

## 2023-10-05 ASSESSMENT — PAIN DESCRIPTION - INTENSITY
RATING_2: 2
RATING_3: 0

## 2023-10-05 NOTE — PROGRESS NOTES
Clinic Documentation      Education Provided:  [x] Review of Pao Hurley  [] Agreement Review  [x] PEG Score Calculated [] PHQ Score Calculated [] ORT Score Calculated    [] Compliance Issues Discussed [] Cognitive Behavior Needs [x] Exercise [] Review of Test [] Financial Issues  [x] Tobacco/Alcohol Use Reviewed [x] Teaching [] New Patient [] Picture Obtained    Physician Plan:  [] Outgoing Referral  [] Pharmacy Consult  [x] Test Ordered [] Prescription Ordered/Changed   [] Obtained Test Results / Consult Notes        Complete if patient is withholding blood thinner for procedure     Blood Thinner Patient is currently taking:      [] Plavix (Hold for 7 days)  [] Aspirin (Hold for 5 days)     [] Pletal (Hold for 2 days)  [] Pradaxa (Hold for 3 days)    [] Effient (Hold for 7 days)  [] Xarelto (Hold for 2 days)    [] Eliquis (Hold for 2 days)  [] Brilinta (Hold for 7 days)    [] Coumadin (Hold for 5 days) - (INR needs to be drawn the day prior to procedure- INR < 2.0)    [] Aggrenox (Hold for 7 days)        [] Patient will stop medication on their own.    [] Blood Thinner Form Faxed for approval to hold.    Provider form faxed to:     Assessment Completed by:  Electronically signed by Rosita Hernandez RN on 10/5/2023 at 9:25 AM

## 2023-11-02 PROBLEM — F11.90 CHRONIC, CONTINUOUS USE OF OPIOIDS: Status: ACTIVE | Noted: 2023-11-02

## 2023-11-02 PROBLEM — Z51.81 ENCOUNTER FOR MONITORING OPIOID MAINTENANCE THERAPY: Status: ACTIVE | Noted: 2023-11-02

## 2023-11-02 PROBLEM — Z79.891 ENCOUNTER FOR MONITORING OPIOID MAINTENANCE THERAPY: Status: ACTIVE | Noted: 2023-11-02

## 2023-11-02 ASSESSMENT — ENCOUNTER SYMPTOMS: BACK PAIN: 1

## 2023-11-09 DIAGNOSIS — M43.16 SPONDYLOLISTHESIS AT L3-L4 LEVEL: ICD-10-CM

## 2023-11-09 DIAGNOSIS — G57.01 PIRIFORMIS SYNDROME OF RIGHT SIDE: ICD-10-CM

## 2023-11-09 DIAGNOSIS — M25.511 CHRONIC PAIN OF BOTH SHOULDERS: ICD-10-CM

## 2023-11-09 DIAGNOSIS — M43.10 ANTEROLISTHESIS: ICD-10-CM

## 2023-11-09 DIAGNOSIS — M17.10 ARTHRITIS OF KNEE: ICD-10-CM

## 2023-11-09 DIAGNOSIS — M25.512 CHRONIC PAIN OF BOTH SHOULDERS: ICD-10-CM

## 2023-11-09 DIAGNOSIS — G89.29 CHRONIC PAIN OF BOTH SHOULDERS: ICD-10-CM

## 2023-11-09 RX ORDER — METHOCARBAMOL 750 MG/1
750 TABLET, FILM COATED ORAL 4 TIMES DAILY PRN
Qty: 120 TABLET | Refills: 2 | Status: SHIPPED | OUTPATIENT
Start: 2023-11-09 | End: 2024-05-07

## 2023-11-09 RX ORDER — HYDROCODONE BITARTRATE AND ACETAMINOPHEN 5; 325 MG/1; MG/1
2 TABLET ORAL EVERY 6 HOURS PRN
Qty: 240 TABLET | Refills: 0 | Status: SHIPPED | OUTPATIENT
Start: 2023-11-09 | End: 2023-12-09

## 2023-11-09 RX ORDER — CELECOXIB 100 MG/1
100 CAPSULE ORAL 2 TIMES DAILY
Qty: 60 CAPSULE | Refills: 5 | Status: SHIPPED | OUTPATIENT
Start: 2023-11-09

## 2023-11-15 ENCOUNTER — HOSPITAL ENCOUNTER (OUTPATIENT)
Dept: PAIN MANAGEMENT | Age: 58
Discharge: HOME | End: 2023-11-15
Payer: COMMERCIAL

## 2023-11-15 VITALS
SYSTOLIC BLOOD PRESSURE: 115 MMHG | RESPIRATION RATE: 18 BRPM | OXYGEN SATURATION: 99 % | HEART RATE: 94 BPM | DIASTOLIC BLOOD PRESSURE: 103 MMHG | TEMPERATURE: 96.5 F

## 2023-11-15 PROCEDURE — 20553 NJX 1/MLT TRIGGER POINTS 3/>: CPT

## 2023-11-15 PROCEDURE — 2500000003 HC RX 250 WO HCPCS

## 2023-11-15 PROCEDURE — 6360000002 HC RX W HCPCS

## 2023-11-15 PROCEDURE — 64449 NJX AA&/STRD LMBR PLEX NFS: CPT

## 2023-11-15 RX ORDER — BUPIVACAINE HYDROCHLORIDE 5 MG/ML
1 INJECTION, SOLUTION EPIDURAL; INTRACAUDAL ONCE
Status: DISCONTINUED | OUTPATIENT
Start: 2023-11-15 | End: 2023-11-17 | Stop reason: HOSPADM

## 2023-11-15 RX ORDER — ETHYL CHLORIDE 100 %
AEROSOL, SPRAY (ML) TOPICAL PRN
Status: DISCONTINUED | OUTPATIENT
Start: 2023-11-15 | End: 2023-11-17 | Stop reason: HOSPADM

## 2023-11-15 RX ORDER — EZETIMIBE 10 MG/1
10 TABLET ORAL DAILY
COMMUNITY

## 2023-11-15 RX ORDER — BUPIVACAINE HYDROCHLORIDE 5 MG/ML
10 INJECTION, SOLUTION EPIDURAL; INTRACAUDAL ONCE
Status: DISCONTINUED | OUTPATIENT
Start: 2023-11-15 | End: 2023-11-17 | Stop reason: HOSPADM

## 2023-11-15 RX ORDER — METHYLPREDNISOLONE ACETATE 80 MG/ML
80 INJECTION, SUSPENSION INTRA-ARTICULAR; INTRALESIONAL; INTRAMUSCULAR; SOFT TISSUE ONCE
Status: DISCONTINUED | OUTPATIENT
Start: 2023-11-15 | End: 2023-11-17 | Stop reason: HOSPADM

## 2023-11-15 RX ORDER — LIDOCAINE HYDROCHLORIDE 10 MG/ML
1 INJECTION, SOLUTION EPIDURAL; INFILTRATION; INTRACAUDAL; PERINEURAL ONCE
Status: DISCONTINUED | OUTPATIENT
Start: 2023-11-15 | End: 2023-11-17 | Stop reason: HOSPADM

## 2023-11-15 RX ORDER — LIDOCAINE HYDROCHLORIDE 10 MG/ML
10 INJECTION, SOLUTION EPIDURAL; INFILTRATION; INTRACAUDAL; PERINEURAL ONCE
Status: DISCONTINUED | OUTPATIENT
Start: 2023-11-15 | End: 2023-11-17 | Stop reason: HOSPADM

## 2023-11-16 NOTE — PROCEDURES
[facility] Physical & Pain Medicine    Patient Name: Joseline Sanders    : 1965    Age: 59 y.o. Sex: female    Date: 2023    Pre-op Diagnosis: right  Sacroiliac Joint(s) Dysfunction/ Sacroiliitis    Post-op Diagnosis: right  Sacroiliac Joint(s) Dysfunction/ Sacroliliits    Procedure: Ultrasound Guided Injection of  right Sacroiliac Joint(s)     Performing Procedure:  KARINE Morris    No data found. right  Sacroiliac Joint(s)     Description of Procedure:    After voluntary, informed and signed consent obtained the patient was placed in a prone position. Appropriate time out was obtained per policy. The Sacroiliac Joint(s) was palpated for area of maximal tenderness. The area was prepped in an aseptic fashion with CHG swab. The ultrasound transducer was used to confirm the appropriate location. The skin was sprayed with Gebauer's Solution. Under aseptic technique and direct ultrasound visualization a 22 gauge 3 inch spinal needle was introduced into the Sacroiliac Joint(s). Patient was asked if any new pain was radiating down the ipsilateral lower extremity. If patient noted radiating pain the needle was readjusted until radiating pain was gone. After a negative aspiration, a solution of 1 ml of 0.5% Marcaine Plain and 1 ml of 1% Lidocaine Plain and 2 ml of DepoMedrol (40 mg/ml) was injected into the Sacroiliac Joint(s). The needle was withdrawn and a sterile dressing applied. If this was a bilateral procedure, the same steps were followed on the opposite side. Discharge: The patient tolerated the procedure well. There were no complications during the procedure and the patient was discharged home with discharge instructions. The patient has been instructed to contact the office should there be any complications or questions to arise between today and their next appointment.     Plan:    The patient is to keep previously scheduled follow up appointment

## 2023-11-21 ENCOUNTER — TELEPHONE (OUTPATIENT)
Dept: PAIN MANAGEMENT | Age: 58
End: 2023-11-21

## 2023-12-11 ENCOUNTER — OFFICE VISIT (OUTPATIENT)
Dept: NEUROSURGERY | Age: 58
End: 2023-12-11
Payer: COMMERCIAL

## 2023-12-11 VITALS
DIASTOLIC BLOOD PRESSURE: 88 MMHG | BODY MASS INDEX: 38.49 KG/M2 | HEIGHT: 65 IN | HEART RATE: 80 BPM | SYSTOLIC BLOOD PRESSURE: 130 MMHG | WEIGHT: 231.04 LBS

## 2023-12-11 DIAGNOSIS — M53.3 SACROILIAC JOINT DYSFUNCTION OF RIGHT SIDE: Primary | ICD-10-CM

## 2023-12-11 DIAGNOSIS — M54.50 CHRONIC RIGHT-SIDED LOW BACK PAIN WITHOUT SCIATICA: ICD-10-CM

## 2023-12-11 DIAGNOSIS — G89.29 CHRONIC RIGHT-SIDED LOW BACK PAIN WITHOUT SCIATICA: ICD-10-CM

## 2023-12-11 DIAGNOSIS — Z98.1 S/P LUMBAR FUSION: ICD-10-CM

## 2023-12-11 PROCEDURE — 3079F DIAST BP 80-89 MM HG: CPT | Performed by: NEUROLOGICAL SURGERY

## 2023-12-11 PROCEDURE — 99213 OFFICE O/P EST LOW 20 MIN: CPT | Performed by: NEUROLOGICAL SURGERY

## 2023-12-11 PROCEDURE — 3075F SYST BP GE 130 - 139MM HG: CPT | Performed by: NEUROLOGICAL SURGERY

## 2023-12-11 ASSESSMENT — ENCOUNTER SYMPTOMS
BACK PAIN: 1
RESPIRATORY NEGATIVE: 1
EYES NEGATIVE: 1
GASTROINTESTINAL NEGATIVE: 1

## 2023-12-14 ENCOUNTER — OFFICE VISIT (OUTPATIENT)
Dept: CARDIOLOGY CLINIC | Age: 58
End: 2023-12-14
Payer: COMMERCIAL

## 2023-12-14 VITALS
HEART RATE: 76 BPM | HEIGHT: 65 IN | DIASTOLIC BLOOD PRESSURE: 98 MMHG | BODY MASS INDEX: 38.32 KG/M2 | SYSTOLIC BLOOD PRESSURE: 152 MMHG | WEIGHT: 230 LBS | OXYGEN SATURATION: 98 %

## 2023-12-14 DIAGNOSIS — I10 ESSENTIAL HYPERTENSION: ICD-10-CM

## 2023-12-14 DIAGNOSIS — M25.512 CHRONIC PAIN OF BOTH SHOULDERS: ICD-10-CM

## 2023-12-14 DIAGNOSIS — G89.29 CHRONIC PAIN OF BOTH SHOULDERS: ICD-10-CM

## 2023-12-14 DIAGNOSIS — R00.0 TACHYCARDIA: ICD-10-CM

## 2023-12-14 DIAGNOSIS — R06.00 DYSPNEA, UNSPECIFIED TYPE: Primary | ICD-10-CM

## 2023-12-14 DIAGNOSIS — M25.511 CHRONIC PAIN OF BOTH SHOULDERS: ICD-10-CM

## 2023-12-14 DIAGNOSIS — M43.16 SPONDYLOLISTHESIS AT L3-L4 LEVEL: ICD-10-CM

## 2023-12-14 PROCEDURE — 93246 EXT ECG>7D<15D RECORDING: CPT | Performed by: NURSE PRACTITIONER

## 2023-12-14 PROCEDURE — 3080F DIAST BP >= 90 MM HG: CPT | Performed by: NURSE PRACTITIONER

## 2023-12-14 PROCEDURE — 3077F SYST BP >= 140 MM HG: CPT | Performed by: NURSE PRACTITIONER

## 2023-12-14 PROCEDURE — 93000 ELECTROCARDIOGRAM COMPLETE: CPT | Performed by: NURSE PRACTITIONER

## 2023-12-14 PROCEDURE — 99204 OFFICE O/P NEW MOD 45 MIN: CPT | Performed by: NURSE PRACTITIONER

## 2023-12-14 RX ORDER — HYDROCODONE BITARTRATE AND ACETAMINOPHEN 5; 325 MG/1; MG/1
2 TABLET ORAL EVERY 6 HOURS PRN
Qty: 240 TABLET | Refills: 0 | Status: SHIPPED | OUTPATIENT
Start: 2023-12-14 | End: 2024-01-13

## 2023-12-14 NOTE — PATIENT INSTRUCTIONS
[employer] at the [facility] and [facility] located on the first floor of [address] [place] [address] entrance and turn immediately to your left. Date/Time:     Patient's contact number:  [phone] (home)     Echocardiogram -  No prep. Takes approximately 30 min. An echocardiogram uses sound waves to produce images of your heart. This commonly used test allows your doctor to see how your heart is beating and pumping blood. Your doctor can use the images from an echocardiogram to identify various abnormalities in the heart muscle and valves. This test has 2 parts: You will be asked to disrobe from the waist up and given a gown to wear. The technologist will then hook up an EKG monitor to you for the entire exam.   You will then have an ultrasound of your heart (echocardiogram) to assess the heart muscle, heart valves and heart function. You may eat and take any medicines before the exam.     If you need to change your appointment, please call outpatient scheduling at [phone]. [employer] at the [address] [place] [address] and [facility] located on the first floor of [address] [place] [address] entrance and turn immediately to your left. Patient's contact number:  [phone] (home)     Date/Time:     Dobutamine Stress Test    A chemical stress test uses chemical agents injected into the body through the vein. These chemicals make the heart function as if it were under stress. A chemical stress test is used when a traditional stress test (called a cardiac stress test) cannot be done. Testing will take approximately one hour. Dobutamine Stress Echocardiogram Instructions:   No caffeine 24 hours prior to the testing. This includes: coffee, pop/soda, chocolate, cold medications, etc.  Any product that might contain caffeine.     Do not eat or drink anything, except water, eight (8) hours before the test.   No alcohol or

## 2023-12-14 NOTE — PROGRESS NOTES
[facility]  [address] [place] [address] [address] [place] [address]  [phone]      Chief Complaint / Reason for Being Seen: Referral for dyspnea on exertion. 1. Dyspnea, unspecified type    2. Essential hypertension      Patient with negative family history of early coronary artery disease. She is a current smoker. She does have a history of hypertension and hyperlipidemia. She denies diabetes, COPD, or chronic kidney disease. She did have a stress test about 5 years ago here it was a dobutamine stress echocardiogram that was negative. Patient presents to clinic today with complaints of dyspnea on exertion. She notes that when she does minimal activities around the house she becomes short of breath and diaphoretic. Relieved with rest.  There is no angina. She is also noted tachycardia. There is no syncope. Subjective: Old records have been obtained from the referring providers. Those records have been reviewed and summarized.       Corrie Diggs is a 59 y.o. female with the following history as [facility] in [facility]:  Patient Active Problem List    Diagnosis Date Noted    Hx of total knee arthroplasty, right 11/27/2022    Arthritis of carpometacarpal Dare) joint of right thumb 07/13/2022    Encounter for monitoring opioid maintenance therapy 11/02/2023    Chronic, continuous use of opioids 11/02/2023    Abscess 08/13/2023    Perineal abscess 07/20/2023    History of lumbar fusion 11/27/2022    Primary osteoarthritis of left knee 09/09/2021    History of tibial fracture 01/26/2021    Chronic pain of left knee 01/04/2021    Pain medication agreement 08/20/2020    Spondylolisthesis at L3-L4 level 08/05/2020    Encounter for smoking cessation counseling 12/02/2019    Chronic pain of both shoulders 10/14/2019    Trochanteric bursitis of right hip 10/14/2019    Chronic bilateral low back pain with bilateral sciatica 10/03/2019    SI (sacroiliac) joint dysfunction 10/03/2019    Piriformis syndrome

## 2024-01-08 DIAGNOSIS — R00.0 TACHYCARDIA: Primary | ICD-10-CM

## 2024-01-08 PROCEDURE — 93248 EXT ECG>7D<15D REV&INTERPJ: CPT | Performed by: NURSE PRACTITIONER

## 2024-01-09 ENCOUNTER — HOSPITAL ENCOUNTER (OUTPATIENT)
Dept: PAIN MANAGEMENT | Age: 59
Discharge: HOME | End: 2024-01-09
Payer: COMMERCIAL

## 2024-01-09 VITALS
BODY MASS INDEX: 38.65 KG/M2 | RESPIRATION RATE: 16 BRPM | WEIGHT: 232 LBS | HEART RATE: 83 BPM | HEIGHT: 65 IN | OXYGEN SATURATION: 94 % | DIASTOLIC BLOOD PRESSURE: 70 MMHG | TEMPERATURE: 97.4 F | SYSTOLIC BLOOD PRESSURE: 109 MMHG

## 2024-01-09 PROCEDURE — 99213 OFFICE O/P EST LOW 20 MIN: CPT

## 2024-01-09 RX ORDER — OXYCODONE HYDROCHLORIDE AND ACETAMINOPHEN 5; 325 MG/1; MG/1
1 TABLET ORAL EVERY 6 HOURS PRN
COMMUNITY
Start: 2023-12-29

## 2024-01-09 ASSESSMENT — PAIN DESCRIPTION - PAIN TYPE: TYPE: CHRONIC PAIN

## 2024-01-09 ASSESSMENT — ENCOUNTER SYMPTOMS
BACK PAIN: 1
CONSTIPATION: 0
BOWEL INCONTINENCE: 0

## 2024-01-09 ASSESSMENT — PAIN DESCRIPTION - LOCATION
LOCATION_3: SHOULDER
LOCATION_2: KNEE
LOCATION: BACK

## 2024-01-09 ASSESSMENT — PAIN DESCRIPTION - ORIENTATION
ORIENTATION: LOWER
ORIENTATION_2: RIGHT
ORIENTATION_3: RIGHT

## 2024-01-09 ASSESSMENT — PAIN DESCRIPTION - INTENSITY: RATING_2: 1

## 2024-01-09 ASSESSMENT — PAIN SCALES - GENERAL: PAINLEVEL_OUTOF10: 6

## 2024-01-09 NOTE — PROGRESS NOTES
Clinic Documentation      Education Provided:  [x] Review of Calixto  [] Agreement Review  [x] PEG Score Calculated [] PHQ Score Calculated [] ORT Score Calculated    [] Compliance Issues Discussed [] Cognitive Behavior Needs [x] Exercise [] Review of Test [] Financial Issues  [x] Tobacco/Alcohol Use Reviewed [x] Teaching [] New Patient [] Picture Obtained    Physician Plan:  [] Outgoing Referral  [] Pharmacy Consult  [] Test Ordered [] Prescription Ordered/Changed   [] Obtained Test Results / Consult Notes        Complete if patient is withholding blood thinner for procedure     Blood Thinner Patient is currently taking:      [] Plavix (Hold for 7 days)  [] Aspirin (Hold for 5 days)     [] Pletal (Hold for 2 days)  [] Pradaxa (Hold for 3 days)    [] Effient (Hold for 7 days)  [] Xarelto (Hold for 2 days)    [] Eliquis (Hold for 2 days)  [] Brilinta (Hold for 7 days)    [] Coumadin (Hold for 5 days) - (INR needs to be drawn the day prior to procedure- INR < 2.0)    [] Aggrenox (Hold for 7 days)        [] Patient will stop medication on their own.    [] Blood Thinner Form Faxed for approval to hold.   Provider form faxed to:     Assessment Completed by:  Electronically signed by Mary Abbott RN on 1/9/2024 at 4:58 PM

## 2024-01-09 NOTE — PROGRESS NOTES
[facility] Physical & Pain Medicine  Office Note    Patient Name: Ludmila Rodríguez    MR #: [id number]    Account #: [id number]    : 1965    Age: 58 y.o.    Sex: female    Date: 10/5/2023    PCP: Chris Saldaña MD    Chief Complaint:   Chief Complaint   Patient presents with    Back Pain     6/10    Knee Pain     Right, 1/10    Shoulder Pain     Right, 0/10       History of Present Illness:     Ludmila Rodríguez is a 58 y.o. female who presents for procedure follow up, medication change and PT follow up.     Patient was seen by Dr. Oh on 2023 regarding SI joint dysfunction. He noted that she had bilateral SI joint degeneration. Patient has seen orthopedic surgeon who states that the pain is not coming from his hips. He recommended she continue with having PT as ordered by PM and to continue to have injections.     Patient had right SI injection on 2023 in which she had > 85% relief for greater than 1 day. She states that the injection did target the source of her pain.     Patient did start a dedicated course of PT for her SI joint on 2023. She is to attend 2 x's per week for 4 weeks.    Back Pain  This is a chronic problem. The current episode started more than 1 year ago. The problem occurs constantly. The problem has been waxing and waning since onset. The pain is present in the sacro-iliac, gluteal and lumbar spine. The quality of the pain is described as shooting, stabbing, cramping and aching. Radiates to: BLE. The pain is at a severity of 5/10. The symptoms are aggravated by position, standing, twisting and sitting (Walking). Associated symptoms include tingling (improved since surgery). Pertinent negatives include no bladder incontinence, bowel incontinence or leg pain (improved since surgery).   Knee Pain  This is a chronic problem. The current episode started more than 1 year ago. The problem occurs intermittently. The problem has been waxing and waning. Associated symptoms

## 2024-01-10 ENCOUNTER — TELEPHONE (OUTPATIENT)
Dept: CARDIOLOGY CLINIC | Age: 59
End: 2024-01-10

## 2024-01-11 ENCOUNTER — OFFICE VISIT (OUTPATIENT)
Dept: CARDIOLOGY CLINIC | Age: 59
End: 2024-01-11
Payer: COMMERCIAL

## 2024-01-11 VITALS
OXYGEN SATURATION: 98 % | SYSTOLIC BLOOD PRESSURE: 142 MMHG | DIASTOLIC BLOOD PRESSURE: 84 MMHG | WEIGHT: 232 LBS | BODY MASS INDEX: 38.65 KG/M2 | HEIGHT: 65 IN | HEART RATE: 82 BPM

## 2024-01-11 DIAGNOSIS — R00.2 PALPITATIONS: ICD-10-CM

## 2024-01-11 DIAGNOSIS — R00.0 TACHYCARDIA: ICD-10-CM

## 2024-01-11 DIAGNOSIS — I10 ESSENTIAL HYPERTENSION: Primary | ICD-10-CM

## 2024-01-11 PROCEDURE — 3079F DIAST BP 80-89 MM HG: CPT | Performed by: NURSE PRACTITIONER

## 2024-01-11 PROCEDURE — 99214 OFFICE O/P EST MOD 30 MIN: CPT | Performed by: NURSE PRACTITIONER

## 2024-01-11 PROCEDURE — 3077F SYST BP >= 140 MM HG: CPT | Performed by: NURSE PRACTITIONER

## 2024-01-11 ASSESSMENT — ENCOUNTER SYMPTOMS
SHORTNESS OF BREATH: 0
COUGH: 0
WHEEZING: 0
SORE THROAT: 0
CHEST TIGHTNESS: 0

## 2024-01-11 NOTE — PROGRESS NOTES
[facility] Cardiology   Established Patient Office Visit  [address]  [address]  [facility] [id number] [phone]        OFFICE VISIT:  2024    Ludmila Rodríguez - : 1965    Reason For Visit:  Ludmila is a 58 y.o. female who is here for Results    1. Essential hypertension    2. Tachycardia    3. Palpitations        Patient with negative family history of early coronary artery disease. She is a current smoker. She does have a history of hypertension and hyperlipidemia. She denies diabetes, COPD, or chronic kidney disease. She did have a stress test about 5 years ago here it was a dobutamine stress echocardiogram that was negative.     Patient presented to clinic on 2023 with complaints of dyspnea on exertion.  There was no chest pain.  Did have some tachycardia.  We did order dobutamine stress echocardiogram 2D echo and placed a 14-day event monitor.       Summary   Mitral annular calcification is present.   Mildly thickened mitral valve with normal leaflet mobility. No evidence of   mitral valve stenosis or mitral regurgitation.   Aortic valve appears to be tricuspid.   Structurally normal aortic valve.   No significant aortic regurgitation or stenosis is noted.   Tricuspid valve is structurally normal.   Trivial tricuspid regurgitation with estimated RVSP of 36 mm Hg.   Normal left ventricular size with preserved LV function and an estimated   ejection fraction of approximately 65-70%.   Mild concentric left ventricular hypertrophy.   No regional wall motion abnormalities.   No evidence of left ventricular mass or thrombus noted.      Signature      ----------------------------------------------------------------   Electronically signed by Hussein Solis MD(Interpreting   physician) on 2023 08:54 AM   ---------------------------------------------------------------     Summary   The resting echocardiogram was normal.   Dobutamine stress echocardiogram without clinical, electrocardiographic,

## 2024-01-22 ENCOUNTER — HOSPITAL ENCOUNTER (OUTPATIENT)
Dept: MRI IMAGING | Age: 59
Discharge: HOME | End: 2024-01-22
Attending: NEUROLOGICAL SURGERY
Payer: COMMERCIAL

## 2024-01-22 ENCOUNTER — OFFICE VISIT (OUTPATIENT)
Dept: NEUROSURGERY | Age: 59
End: 2024-01-22

## 2024-01-22 VITALS
WEIGHT: 231.92 LBS | SYSTOLIC BLOOD PRESSURE: 120 MMHG | DIASTOLIC BLOOD PRESSURE: 72 MMHG | HEIGHT: 65 IN | BODY MASS INDEX: 38.64 KG/M2 | HEART RATE: 77 BPM

## 2024-01-22 DIAGNOSIS — Z98.1 S/P LUMBAR FUSION: ICD-10-CM

## 2024-01-22 DIAGNOSIS — M54.50 CHRONIC RIGHT-SIDED LOW BACK PAIN WITHOUT SCIATICA: ICD-10-CM

## 2024-01-22 DIAGNOSIS — Z98.1 S/P LUMBAR FUSION: Primary | ICD-10-CM

## 2024-01-22 DIAGNOSIS — G89.29 CHRONIC RIGHT-SIDED LOW BACK PAIN WITHOUT SCIATICA: ICD-10-CM

## 2024-01-22 DIAGNOSIS — M53.3 SACROILIAC JOINT DYSFUNCTION OF RIGHT SIDE: ICD-10-CM

## 2024-01-22 PROCEDURE — A9577 INJ MULTIHANCE: HCPCS | Performed by: NEUROLOGICAL SURGERY

## 2024-01-22 PROCEDURE — 72158 MRI LUMBAR SPINE W/O & W/DYE: CPT

## 2024-01-22 PROCEDURE — 6360000004 HC RX CONTRAST MEDICATION: Performed by: NEUROLOGICAL SURGERY

## 2024-01-22 RX ADMIN — GADOBENATE DIMEGLUMINE 20 ML: 529 INJECTION, SOLUTION INTRAVENOUS at 11:08

## 2024-01-22 ASSESSMENT — ENCOUNTER SYMPTOMS
GASTROINTESTINAL NEGATIVE: 1
RESPIRATORY NEGATIVE: 1
EYES NEGATIVE: 1
BACK PAIN: 1

## 2024-01-22 NOTE — PROGRESS NOTES
NEUROSURGERY FOLLOW UP      Chief Complaint:   Chief Complaint   Patient presents with    Follow-up     Patient is here to follow up after imaging and states that her pain has improved since last visit.     Results     MRI 1/22/24         Interval Update:    Patient returns for planned follow-up after undergoing an updated MRI scan.  She has undergone additional injections with pain management (trigger point) that she feels were helpful.  She actually reports less back, hip and leg pain today than at previous visits.      HPI:     The patient is a 57 y.o. female who presents for neurosurgical evaluation of right-sided sacroiliac joint dysfunction.  She has a longstanding history of back and orthopedic problems and underwent L3-5 TLIF with Dr. Tobar in 8/2020.  She did very well after that surgery with improvement in her chronic back pain and resolution of her radicular leg pain.  Since her surgery, she has developed severe right-sided back/buttock/pelvic pain over the right SI joint.  When asked to localize the pain, she points to the PSIS on the right (Sree Finger Test).  She describes pain that will radiate from her right buttock into her posterior thigh, hip and groin.  She follows with Dr. Gardner in Orthopedic Surgery in [place] who has evaluated her hips and replaced her right knee and she has been told that her hips are fine.  She states the pain in her back and buttock is worsened with prolonged sitting and she frequently has to shift her weight.  She also states that she cannot sleep in bed due to her pain and must sleep in a recliner.  She is established with pain management and recently had a right SI injection and reports that this provided ~85% relief of her pain but the injection only lasted ~1 day and then her pain returned.  She is scheduled to start PT for right SI dysfunction in [place].       ROS:    Constitutional: Negative.    HENT: Negative.     Eyes: Negative.    Respiratory: Negative.

## 2024-01-25 DIAGNOSIS — M17.10 ARTHRITIS OF KNEE: ICD-10-CM

## 2024-01-25 DIAGNOSIS — M25.511 CHRONIC PAIN OF BOTH SHOULDERS: ICD-10-CM

## 2024-01-25 DIAGNOSIS — G89.29 CHRONIC PAIN OF BOTH SHOULDERS: ICD-10-CM

## 2024-01-25 DIAGNOSIS — M43.16 SPONDYLOLISTHESIS AT L3-L4 LEVEL: ICD-10-CM

## 2024-01-25 DIAGNOSIS — M25.512 CHRONIC PAIN OF BOTH SHOULDERS: ICD-10-CM

## 2024-01-27 RX ORDER — HYDROCODONE BITARTRATE AND ACETAMINOPHEN 10; 325 MG/1; MG/1
1 TABLET ORAL EVERY 6 HOURS PRN
Qty: 120 TABLET | Refills: 0 | Status: SHIPPED | OUTPATIENT
Start: 2024-01-27 | End: 2024-02-26

## 2024-01-27 RX ORDER — HYDROCODONE BITARTRATE AND ACETAMINOPHEN 5; 325 MG/1; MG/1
2 TABLET ORAL EVERY 6 HOURS PRN
Qty: 240 TABLET | Refills: 0 | Status: SHIPPED | OUTPATIENT
Start: 2024-01-27 | End: 2024-02-26

## 2024-01-27 RX ORDER — CELECOXIB 100 MG/1
100 CAPSULE ORAL 2 TIMES DAILY
Qty: 60 CAPSULE | Refills: 5 | Status: SHIPPED | OUTPATIENT
Start: 2024-01-27

## 2024-02-05 DIAGNOSIS — G57.01 PIRIFORMIS SYNDROME OF RIGHT SIDE: ICD-10-CM

## 2024-02-05 DIAGNOSIS — M43.10 ANTEROLISTHESIS: ICD-10-CM

## 2024-02-05 RX ORDER — METHOCARBAMOL 750 MG/1
750 TABLET, FILM COATED ORAL 4 TIMES DAILY PRN
Qty: 120 TABLET | Refills: 2 | Status: SHIPPED | OUTPATIENT
Start: 2024-02-05 | End: 2024-08-03

## 2024-02-26 DIAGNOSIS — M25.512 CHRONIC PAIN OF BOTH SHOULDERS: ICD-10-CM

## 2024-02-26 DIAGNOSIS — M25.511 CHRONIC PAIN OF BOTH SHOULDERS: ICD-10-CM

## 2024-02-26 DIAGNOSIS — M17.10 ARTHRITIS OF KNEE: ICD-10-CM

## 2024-02-26 DIAGNOSIS — M43.16 SPONDYLOLISTHESIS AT L3-L4 LEVEL: ICD-10-CM

## 2024-02-26 DIAGNOSIS — G89.29 CHRONIC PAIN OF BOTH SHOULDERS: ICD-10-CM

## 2024-02-27 RX ORDER — HYDROCODONE BITARTRATE AND ACETAMINOPHEN 10; 325 MG/1; MG/1
1 TABLET ORAL EVERY 6 HOURS PRN
Qty: 120 TABLET | Refills: 0 | Status: SHIPPED | OUTPATIENT
Start: 2024-02-27 | End: 2024-03-28

## 2024-03-19 DIAGNOSIS — M25.512 CHRONIC PAIN OF BOTH SHOULDERS: ICD-10-CM

## 2024-03-19 DIAGNOSIS — M43.10 ANTEROLISTHESIS: ICD-10-CM

## 2024-03-19 DIAGNOSIS — M43.16 SPONDYLOLISTHESIS AT L3-L4 LEVEL: ICD-10-CM

## 2024-03-19 DIAGNOSIS — G57.01 PIRIFORMIS SYNDROME OF RIGHT SIDE: ICD-10-CM

## 2024-03-19 DIAGNOSIS — G89.29 CHRONIC PAIN OF BOTH SHOULDERS: ICD-10-CM

## 2024-03-19 DIAGNOSIS — M17.10 ARTHRITIS OF KNEE: ICD-10-CM

## 2024-03-19 DIAGNOSIS — M25.511 CHRONIC PAIN OF BOTH SHOULDERS: ICD-10-CM

## 2024-03-19 RX ORDER — HYDROCODONE BITARTRATE AND ACETAMINOPHEN 10; 325 MG/1; MG/1
1 TABLET ORAL EVERY 6 HOURS PRN
Qty: 120 TABLET | Refills: 0 | Status: SHIPPED | OUTPATIENT
Start: 2024-03-29 | End: 2024-04-28

## 2024-04-02 ENCOUNTER — TELEPHONE (OUTPATIENT)
Dept: PAIN MANAGEMENT | Age: 59
End: 2024-04-02

## 2024-04-23 ENCOUNTER — OFFICE VISIT (OUTPATIENT)
Dept: CARDIOLOGY CLINIC | Age: 59
End: 2024-04-23
Payer: COMMERCIAL

## 2024-04-23 VITALS
HEART RATE: 82 BPM | WEIGHT: 239 LBS | HEIGHT: 65 IN | BODY MASS INDEX: 39.82 KG/M2 | DIASTOLIC BLOOD PRESSURE: 84 MMHG | SYSTOLIC BLOOD PRESSURE: 140 MMHG | OXYGEN SATURATION: 98 %

## 2024-04-23 DIAGNOSIS — R00.0 TACHYCARDIA: ICD-10-CM

## 2024-04-23 DIAGNOSIS — I10 ESSENTIAL HYPERTENSION: Primary | ICD-10-CM

## 2024-04-23 DIAGNOSIS — R00.2 PALPITATIONS: ICD-10-CM

## 2024-04-23 PROCEDURE — 3077F SYST BP >= 140 MM HG: CPT | Performed by: NURSE PRACTITIONER

## 2024-04-23 PROCEDURE — 3079F DIAST BP 80-89 MM HG: CPT | Performed by: NURSE PRACTITIONER

## 2024-04-23 PROCEDURE — 99214 OFFICE O/P EST MOD 30 MIN: CPT | Performed by: NURSE PRACTITIONER

## 2024-04-23 ASSESSMENT — ENCOUNTER SYMPTOMS
COUGH: 0
SORE THROAT: 0
CHEST TIGHTNESS: 0
SHORTNESS OF BREATH: 0
WHEEZING: 0

## 2024-04-23 NOTE — PROGRESS NOTES
[facility]   Established Patient Office Visit  [address]  [address]  [facility] [address] [phone]        OFFICE VISIT:  2024    Ludmila Rodríguez - : 1965    Reason For Visit:  Ludmila is a 58 y.o. female who is here for 3 Month Follow-Up    1. Essential hypertension    2. Palpitations    3. Tachycardia          Patient with negative family history of early coronary artery disease. She is a current smoker. She does have a history of hypertension and hyperlipidemia. She denies diabetes, COPD, or chronic kidney disease. She did have a stress test about 5 years ago here it was a dobutamine stress echocardiogram that was negative.         DATA:   Summary   Mitral annular calcification is present.   Mildly thickened mitral valve with normal leaflet mobility. No evidence of   mitral valve stenosis or mitral regurgitation.   Aortic valve appears to be tricuspid.   Structurally normal aortic valve.   No significant aortic regurgitation or stenosis is noted.   Tricuspid valve is structurally normal.   Trivial tricuspid regurgitation with estimated RVSP of 36 mm Hg.   Normal left ventricular size with preserved LV function and an estimated   ejection fraction of approximately 65-70%.   Mild concentric left ventricular hypertrophy.   No regional wall motion abnormalities.   No evidence of left ventricular mass or thrombus noted.      Signature      ----------------------------------------------------------------   Electronically signed by Hussein Solis MD(Interpreting   physician) on 2023 08:54 AM   ---------------------------------------------------------------     Summary   The resting echocardiogram was normal.   Dobutamine stress echocardiogram without clinical, electrocardiographic,   or echocardiographic evidence of myocardial ischemia.   There is low risk of myocardial ischemia.      Signature      ----------------------------------------------------------------   Electronically signed

## 2024-05-01 ENCOUNTER — OFFICE VISIT (OUTPATIENT)
Dept: NEUROSURGERY | Age: 59
End: 2024-05-01
Payer: COMMERCIAL

## 2024-05-01 VITALS
HEART RATE: 80 BPM | HEIGHT: 65 IN | DIASTOLIC BLOOD PRESSURE: 82 MMHG | BODY MASS INDEX: 39.82 KG/M2 | SYSTOLIC BLOOD PRESSURE: 130 MMHG | WEIGHT: 238.98 LBS

## 2024-05-01 DIAGNOSIS — Z98.1 ADJACENT SEGMENT DISEASE OF LUMBAR SPINE WITH HISTORY OF FUSION PROCEDURE: Primary | ICD-10-CM

## 2024-05-01 DIAGNOSIS — M51.36 ADJACENT SEGMENT DISEASE OF LUMBAR SPINE WITH HISTORY OF FUSION PROCEDURE: Primary | ICD-10-CM

## 2024-05-01 DIAGNOSIS — Z98.1 S/P LUMBAR FUSION: ICD-10-CM

## 2024-05-01 PROCEDURE — 3079F DIAST BP 80-89 MM HG: CPT | Performed by: NEUROLOGICAL SURGERY

## 2024-05-01 PROCEDURE — 3075F SYST BP GE 130 - 139MM HG: CPT | Performed by: NEUROLOGICAL SURGERY

## 2024-05-01 PROCEDURE — 99213 OFFICE O/P EST LOW 20 MIN: CPT | Performed by: NEUROLOGICAL SURGERY

## 2024-05-01 ASSESSMENT — ENCOUNTER SYMPTOMS
RESPIRATORY NEGATIVE: 1
GASTROINTESTINAL NEGATIVE: 1
BACK PAIN: 1
EYES NEGATIVE: 1

## 2024-05-01 NOTE — PROGRESS NOTES
NEUROSURGERY FOLLOW UP      Chief Complaint:   Chief Complaint   Patient presents with    Follow-up     Patient states that her back pain is worse than last visit.     Results     XR pushed, report in media         Interval Update:    Patient returns for planned follow-up.  She reports that her pain is worse than at our last visit when she was feeling relatively well.  She complains mostly of pain in her bilateral buttocks and posterior thighs.  She also complains of pain in her bilateral hips when she attempts to walk distances.      HPI:     The patient is a 57 y.o. female who presents for neurosurgical evaluation of right-sided sacroiliac joint dysfunction.  She has a longstanding history of back and orthopedic problems and underwent L3-5 TLIF with Dr. Tobar in 8/2020.  She did very well after that surgery with improvement in her chronic back pain and resolution of her radicular leg pain.  Since her surgery, she has developed severe right-sided back/buttock/pelvic pain over the right SI joint.  When asked to localize the pain, she points to the PSIS on the right (Sree Finger Test).  She describes pain that will radiate from her right buttock into her posterior thigh, hip and groin.  She follows with Dr. Gardner in Orthopedic Surgery in [place] who has evaluated her hips and replaced her right knee and she has been told that her hips are fine.  She states the pain in her back and buttock is worsened with prolonged sitting and she frequently has to shift her weight.  She also states that she cannot sleep in bed due to her pain and must sleep in a recliner.  She is established with pain management and recently had a right SI injection and reports that this provided ~85% relief of her pain but the injection only lasted ~1 day and then her pain returned.  She is scheduled to start PT for right SI dysfunction in [place].       ROS:    Constitutional: Negative.    HENT: Negative.     Eyes: Negative.    Respiratory:

## 2024-05-02 ENCOUNTER — OFFICE VISIT (OUTPATIENT)
Dept: NEUROSURGERY | Age: 59
End: 2024-05-02
Payer: COMMERCIAL

## 2024-05-02 ENCOUNTER — PREP FOR PROCEDURE (OUTPATIENT)
Dept: NEUROSURGERY | Age: 59
End: 2024-05-02

## 2024-05-02 VITALS
WEIGHT: 238.98 LBS | HEIGHT: 65 IN | BODY MASS INDEX: 39.82 KG/M2 | SYSTOLIC BLOOD PRESSURE: 112 MMHG | DIASTOLIC BLOOD PRESSURE: 80 MMHG

## 2024-05-02 DIAGNOSIS — M48.062 NEUROGENIC CLAUDICATION DUE TO LUMBAR SPINAL STENOSIS: ICD-10-CM

## 2024-05-02 DIAGNOSIS — M54.41 CHRONIC MIDLINE LOW BACK PAIN WITH BILATERAL SCIATICA: ICD-10-CM

## 2024-05-02 DIAGNOSIS — M51.36 DDD (DEGENERATIVE DISC DISEASE), LUMBAR: ICD-10-CM

## 2024-05-02 DIAGNOSIS — M43.16 SPONDYLOLISTHESIS AT L2-L3 LEVEL: ICD-10-CM

## 2024-05-02 DIAGNOSIS — M54.42 CHRONIC MIDLINE LOW BACK PAIN WITH BILATERAL SCIATICA: ICD-10-CM

## 2024-05-02 DIAGNOSIS — M43.16 SPONDYLOLISTHESIS OF LUMBAR REGION: ICD-10-CM

## 2024-05-02 DIAGNOSIS — G89.29 CHRONIC MIDLINE LOW BACK PAIN WITH BILATERAL SCIATICA: ICD-10-CM

## 2024-05-02 DIAGNOSIS — M48.062 SPINAL STENOSIS OF LUMBAR REGION WITH NEUROGENIC CLAUDICATION: Primary | ICD-10-CM

## 2024-05-02 PROCEDURE — 3074F SYST BP LT 130 MM HG: CPT | Performed by: NEUROLOGICAL SURGERY

## 2024-05-02 PROCEDURE — 99215 OFFICE O/P EST HI 40 MIN: CPT | Performed by: NEUROLOGICAL SURGERY

## 2024-05-02 PROCEDURE — 3079F DIAST BP 80-89 MM HG: CPT | Performed by: NEUROLOGICAL SURGERY

## 2024-05-02 RX ORDER — SODIUM CHLORIDE 9 MG/ML
INJECTION, SOLUTION INTRAVENOUS PRN
OUTPATIENT
Start: 2024-05-02

## 2024-05-02 RX ORDER — SODIUM CHLORIDE 0.9 % (FLUSH) 0.9 %
5-40 SYRINGE (ML) INJECTION EVERY 12 HOURS SCHEDULED
OUTPATIENT
Start: 2024-05-02

## 2024-05-02 RX ORDER — ACETAMINOPHEN 325 MG/1
1000 TABLET ORAL ONCE
OUTPATIENT
Start: 2024-05-02 | End: 2024-05-02

## 2024-05-02 RX ORDER — SODIUM CHLORIDE 0.9 % (FLUSH) 0.9 %
5-40 SYRINGE (ML) INJECTION PRN
OUTPATIENT
Start: 2024-05-02

## 2024-05-02 ASSESSMENT — ENCOUNTER SYMPTOMS
BACK PAIN: 1
RESPIRATORY NEGATIVE: 1
EYES NEGATIVE: 1
GASTROINTESTINAL NEGATIVE: 1

## 2024-05-02 NOTE — H&P
IMPRESSION:     Multilevel degenerative changes of the lumbar spine status post L3-L5 fusion as detailed including severe spinal canal stenosis at L2-L3.    I have personally reviewed the images and my interpretation is:  Evidence of previous posterior instrumented fusion L3-L5.  L2-3 there is severe degenerative disc disease and a large central disc extrusion that has migrated superiorly behind the L2 vertebral body and results in severe canal stenosis.  There is a small anterior listhesis that measures 3 to 4 mm.    Flexion/extension x-rays January 26, 2024, outside facility  There is evidence of the previous TLIF of L3-L5.  There is no evidence of hardware malfunction and appears to be robustly fused.  There is some evidence of fishmouthing at the L2-3 level when compared between the flexion and extension x-rays.      ASSESSMENT:   Ludmila Rodríguez is a 54 y.o. female who underwent a TLIF of L3-4, L4-5 for spondylolisthesis of L3-4 on 8/05/2020.      PLAN:  -We have discussed and reviewed the results of the MRI lumbar spine with Mrs. Rodríguez at length.  We explained that she does have severe DDD at the level above her fusion at L2-3 with a large disc herniation/extrusion along with a small spondylolisthesis at that level that is resulting in severe canal stenosis and most likely the culprit of her BLE pain.  We discussed various non-operative treatment options, however, she has attempted most things we know to offer.  We did discuss the type of surgery she would need to address the pathology and alleviate her lower extremity pains.  We did also discuss her weight and how important it is to attempt to lose weight in order to heal appropriately and we also discussed that due to obesity the surgical procedure could be more difficult due to anatomy.  We discussed that we will have to remove the existing hardware, her previous fusion appears to be robust.  She would like to move forward with surgery due to the severity of

## 2024-05-02 NOTE — PROGRESS NOTES
Review of Systems   Constitutional: Negative.    HENT: Negative.     Eyes: Negative.    Respiratory: Negative.     Cardiovascular: Negative.    Gastrointestinal: Negative.    Genitourinary: Negative.    Musculoskeletal:  Positive for back pain, joint pain and myalgias.   Skin: Negative.    Neurological:  Positive for tingling and weakness.   Endo/Heme/Allergies: Negative.    Psychiatric/Behavioral: Negative.        
the standard lumbar spine protocol.  Contrast: 20 mL multiHance.     COMPARISON: Lumbar spine radiographs 11/23/2021     FINDINGS:     Segmentation: No transitional anatomy.  The lowest well-developed disk space is labeled L5-S1.     Post-Surgical Changes/Hardware: L3-L5 posterior lumbar interbody fusion and decompression.  No epidural fibrosis or fluid collection.     Alignment: Straightening of the normal lumbar lordosis.  Grade 1 anterolisthesis of L2 on L3.  Mild levocurvature.     Vertebrae/Bone marrow: No compression fracture.  Type 1 degenerative endplate changes of L2-L3.  T12 osseous hemangioma.     Disk Disease: Severe degenerative disc disease of L2-L3 and L5-S1.  L2-L3 and L5-S1 annular fissures.     Spinal Cord/Cauda Equina: The conus terminates at L1.  Normal cord signal.     Paraspinal Soft Tissues: Normal.     Visualized Abdomen/Pelvis: Normal.     Lower Thoracic Spine: No significant spinal canal stenosis.  No significant neural foraminal stenosis.     Level-By-Level Changes:      - L1-L2: The disk is normal in configuration.  There is mild bilateral facet arthropathy.  There is no neural foraminal stenosis.  There is no spinal canal stenosis.      - L2-L3: Disc bulge with superimposed central disc extrusion.  There is moderate to severe bilateral facet arthropathy.  There is moderate right and mild to moderate left neural foraminal stenosis.  There is severe spinal canal stenosis.      - L3-L4: Fused level with facet hypertrophy.  There is mild bilateral neural foraminal stenosis.  There is no spinal canal stenosis.      - L4-L5: Fused level with facet hypertrophy.  There is mild right and moderate left neural foraminal stenosis.  There is no spinal canal stenosis.      - L5-S1: Small disc osteophyte complex.  There is mild bilateral facet arthropathy.  There is mild left neural foraminal stenosis.  There is no spinal canal stenosis.     Visualized Sacrum and Iliac Wings: No significant

## 2024-05-15 ENCOUNTER — TELEPHONE (OUTPATIENT)
Dept: NEUROSURGERY | Age: 59
End: 2024-05-15

## 2024-05-15 NOTE — TELEPHONE ENCOUNTER
Patient was given brace in office on 5/2/2024. Script and forms filled out. Placed in tray by Missy Griffith to .

## 2024-05-15 NOTE — TELEPHONE ENCOUNTER
Pt has TLIF upcoming on 6/17.  She has [employer].  Should have received LSO back brace in clinic.  Just ensure that she has one prior to surgery please and thank you!

## 2024-05-21 ENCOUNTER — TELEPHONE (OUTPATIENT)
Dept: NEUROSURGERY | Age: 59
End: 2024-05-21

## 2024-05-21 NOTE — TELEPHONE ENCOUNTER
Patient is scheduled for surgery on 6/17/2024. I submitted PA on 5/21/2024 through [email or web address].    Status: NO AUTH REQUIRED

## 2024-06-04 ENCOUNTER — HOSPITAL ENCOUNTER (OUTPATIENT)
Dept: GENERAL RADIOLOGY | Age: 59
Discharge: HOME | End: 2024-06-04
Payer: COMMERCIAL

## 2024-06-04 PROCEDURE — 71046 X-RAY EXAM CHEST 2 VIEWS: CPT

## 2024-06-17 ENCOUNTER — ANESTHESIA (OUTPATIENT)
Dept: OPERATING ROOM | Age: 59
DRG: 460 | End: 2024-06-17
Payer: COMMERCIAL

## 2024-06-17 ENCOUNTER — ANESTHESIA EVENT (OUTPATIENT)
Dept: OPERATING ROOM | Age: 59
DRG: 460 | End: 2024-06-17
Payer: COMMERCIAL

## 2024-06-17 ENCOUNTER — HOSPITAL ENCOUNTER (INPATIENT)
Age: 59
LOS: 2 days | Discharge: HOME | DRG: 460 | End: 2024-06-19
Attending: NEUROLOGICAL SURGERY | Admitting: NEUROLOGICAL SURGERY
Payer: COMMERCIAL

## 2024-06-17 DIAGNOSIS — G89.18 POST-OP PAIN: Primary | ICD-10-CM

## 2024-06-17 LAB
ABO/RH: NORMAL
ANTIBODY SCREEN: NORMAL

## 2024-06-17 PROCEDURE — 3E0U0GB INTRODUCTION OF RECOMBINANT BONE MORPHOGENETIC PROTEIN INTO JOINTS, OPEN APPROACH: ICD-10-PCS | Performed by: NEUROLOGICAL SURGERY

## 2024-06-17 PROCEDURE — 2500000003 HC RX 250 WO HCPCS: Performed by: NEUROLOGICAL SURGERY

## 2024-06-17 PROCEDURE — C1713 ANCHOR/SCREW BN/BN,TIS/BN: HCPCS | Performed by: NEUROLOGICAL SURGERY

## 2024-06-17 PROCEDURE — 61783 SCAN PROC SPINAL: CPT | Performed by: NEUROLOGICAL SURGERY

## 2024-06-17 PROCEDURE — 86850 RBC ANTIBODY SCREEN: CPT

## 2024-06-17 PROCEDURE — 00NY0ZZ RELEASE LUMBAR SPINAL CORD, OPEN APPROACH: ICD-10-PCS | Performed by: NEUROLOGICAL SURGERY

## 2024-06-17 PROCEDURE — 8E0WXBZ COMPUTER ASSISTED PROCEDURE OF TRUNK REGION: ICD-10-PCS | Performed by: NEUROLOGICAL SURGERY

## 2024-06-17 PROCEDURE — 6360000002 HC RX W HCPCS: Performed by: NEUROLOGICAL SURGERY

## 2024-06-17 PROCEDURE — 3700000001 HC ADD 15 MINUTES (ANESTHESIA): Performed by: NEUROLOGICAL SURGERY

## 2024-06-17 PROCEDURE — 22852 REMOVE SPINE FIXATION DEVICE: CPT | Performed by: NEUROLOGICAL SURGERY

## 2024-06-17 PROCEDURE — 6360000002 HC RX W HCPCS: Performed by: NURSE ANESTHETIST, CERTIFIED REGISTERED

## 2024-06-17 PROCEDURE — 94761 N-INVAS EAR/PLS OXIMETRY MLT: CPT

## 2024-06-17 PROCEDURE — 2580000003 HC RX 258: Performed by: NEUROLOGICAL SURGERY

## 2024-06-17 PROCEDURE — 86900 BLOOD TYPING SEROLOGIC ABO: CPT

## 2024-06-17 PROCEDURE — 2580000003 HC RX 258: Performed by: ANESTHESIOLOGY

## 2024-06-17 PROCEDURE — 7100000001 HC PACU RECOVERY - ADDTL 15 MIN: Performed by: NEUROLOGICAL SURGERY

## 2024-06-17 PROCEDURE — 3600000005 HC SURGERY LEVEL 5 BASE: Performed by: NEUROLOGICAL SURGERY

## 2024-06-17 PROCEDURE — C1889 IMPLANT/INSERT DEVICE, NOC: HCPCS | Performed by: NEUROLOGICAL SURGERY

## 2024-06-17 PROCEDURE — 7100000000 HC PACU RECOVERY - FIRST 15 MIN: Performed by: NEUROLOGICAL SURGERY

## 2024-06-17 PROCEDURE — 2709999900 HC NON-CHARGEABLE SUPPLY: Performed by: NEUROLOGICAL SURGERY

## 2024-06-17 PROCEDURE — 86901 BLOOD TYPING SEROLOGIC RH(D): CPT

## 2024-06-17 PROCEDURE — 22840 INSERT SPINE FIXATION DEVICE: CPT | Performed by: NEUROLOGICAL SURGERY

## 2024-06-17 PROCEDURE — 63052 LAM FACETC/FRMT ARTHRD LUM 1: CPT | Performed by: NEUROLOGICAL SURGERY

## 2024-06-17 PROCEDURE — A4217 STERILE WATER/SALINE, 500 ML: HCPCS | Performed by: NEUROLOGICAL SURGERY

## 2024-06-17 PROCEDURE — G0378 HOSPITAL OBSERVATION PER HR: HCPCS

## 2024-06-17 PROCEDURE — 94150 VITAL CAPACITY TEST: CPT

## 2024-06-17 PROCEDURE — 0QP004Z REMOVAL OF INTERNAL FIXATION DEVICE FROM LUMBAR VERTEBRA, OPEN APPROACH: ICD-10-PCS | Performed by: NEUROLOGICAL SURGERY

## 2024-06-17 PROCEDURE — 1200000000 HC SEMI PRIVATE

## 2024-06-17 PROCEDURE — 6370000000 HC RX 637 (ALT 250 FOR IP): Performed by: NEUROLOGICAL SURGERY

## 2024-06-17 PROCEDURE — 22630 ARTHRD PST TQ 1NTRSPC LUM: CPT | Performed by: NEUROLOGICAL SURGERY

## 2024-06-17 PROCEDURE — 2720000010 HC SURG SUPPLY STERILE: Performed by: NEUROLOGICAL SURGERY

## 2024-06-17 PROCEDURE — 22853 INSJ BIOMECHANICAL DEVICE: CPT | Performed by: NEUROLOGICAL SURGERY

## 2024-06-17 PROCEDURE — 2500000003 HC RX 250 WO HCPCS: Performed by: NURSE ANESTHETIST, CERTIFIED REGISTERED

## 2024-06-17 PROCEDURE — C1769 GUIDE WIRE: HCPCS | Performed by: NEUROLOGICAL SURGERY

## 2024-06-17 PROCEDURE — 3600000015 HC SURGERY LEVEL 5 ADDTL 15MIN: Performed by: NEUROLOGICAL SURGERY

## 2024-06-17 PROCEDURE — 0SB20ZZ EXCISION OF LUMBAR VERTEBRAL DISC, OPEN APPROACH: ICD-10-PCS | Performed by: NEUROLOGICAL SURGERY

## 2024-06-17 PROCEDURE — 3700000000 HC ANESTHESIA ATTENDED CARE: Performed by: NEUROLOGICAL SURGERY

## 2024-06-17 PROCEDURE — 36415 COLL VENOUS BLD VENIPUNCTURE: CPT

## 2024-06-17 PROCEDURE — 01NB0ZZ RELEASE LUMBAR NERVE, OPEN APPROACH: ICD-10-PCS | Performed by: NEUROLOGICAL SURGERY

## 2024-06-17 PROCEDURE — 0SG00AJ FUSION OF LUMBAR VERTEBRAL JOINT WITH INTERBODY FUSION DEVICE, POSTERIOR APPROACH, ANTERIOR COLUMN, OPEN APPROACH: ICD-10-PCS | Performed by: NEUROLOGICAL SURGERY

## 2024-06-17 PROCEDURE — 2700000000 HC OXYGEN THERAPY PER DAY

## 2024-06-17 DEVICE — SET SCREW 6540530 5.5/6.0 SOLERA VOYAGER
Type: IMPLANTABLE DEVICE | Site: BACK | Status: FUNCTIONAL
Brand: CD HORIZON® SOLERA® SPINAL SYSTEM

## 2024-06-17 DEVICE — SPACER 6068073 CATALYFT PL SHORT 7MM
Type: IMPLANTABLE DEVICE | Site: BACK | Status: FUNCTIONAL
Brand: CATALYFT PL EXPANDABLE INTERBODY SYSTEM

## 2024-06-17 DEVICE — ALLOGRAFT BNE CHIP 1-4 MM 15 CC FD CRUSH CANC READIGRAFT: Type: IMPLANTABLE DEVICE | Site: SPINE LUMBAR | Status: FUNCTIONAL

## 2024-06-17 DEVICE — SCREW 55850016550 5.5 VOYAGER MAS 6.5X50
Type: IMPLANTABLE DEVICE | Site: BACK | Status: FUNCTIONAL
Brand: CD HORIZON® SOLERA® VOYAGER™ SPINAL SYSTEM

## 2024-06-17 DEVICE — BONE GRAFT KIT 7510050 INFUSE XX SMALL
Type: IMPLANTABLE DEVICE | Site: SPINE LUMBAR | Status: FUNCTIONAL
Brand: INFUSE® BONE GRAFT

## 2024-06-17 RX ORDER — HYDROCODONE BITARTRATE AND ACETAMINOPHEN 10; 325 MG/1; MG/1
1 TABLET ORAL EVERY 4 HOURS PRN
Status: DISCONTINUED | OUTPATIENT
Start: 2024-06-17 | End: 2024-06-19 | Stop reason: HOSPADM

## 2024-06-17 RX ORDER — METHOCARBAMOL 750 MG/1
750 TABLET, FILM COATED ORAL EVERY 8 HOURS
Status: DISCONTINUED | OUTPATIENT
Start: 2024-06-17 | End: 2024-06-19 | Stop reason: HOSPADM

## 2024-06-17 RX ORDER — SODIUM CHLORIDE 0.9 % (FLUSH) 0.9 %
5-40 SYRINGE (ML) INJECTION PRN
Status: DISCONTINUED | OUTPATIENT
Start: 2024-06-17 | End: 2024-06-17 | Stop reason: HOSPADM

## 2024-06-17 RX ORDER — NALOXONE HYDROCHLORIDE 0.4 MG/ML
INJECTION, SOLUTION INTRAMUSCULAR; INTRAVENOUS; SUBCUTANEOUS PRN
Status: DISCONTINUED | OUTPATIENT
Start: 2024-06-17 | End: 2024-06-19 | Stop reason: HOSPADM

## 2024-06-17 RX ORDER — SODIUM CHLORIDE, SODIUM LACTATE, POTASSIUM CHLORIDE, CALCIUM CHLORIDE 600; 310; 30; 20 MG/100ML; MG/100ML; MG/100ML; MG/100ML
INJECTION, SOLUTION INTRAVENOUS CONTINUOUS
Status: DISCONTINUED | OUTPATIENT
Start: 2024-06-17 | End: 2024-06-17 | Stop reason: HOSPADM

## 2024-06-17 RX ORDER — SODIUM CHLORIDE 0.9 % (FLUSH) 0.9 %
5-40 SYRINGE (ML) INJECTION EVERY 12 HOURS SCHEDULED
Status: DISCONTINUED | OUTPATIENT
Start: 2024-06-17 | End: 2024-06-17 | Stop reason: HOSPADM

## 2024-06-17 RX ORDER — HYDROCODONE BITARTRATE AND ACETAMINOPHEN 10; 325 MG/1; MG/1
1 TABLET ORAL EVERY 6 HOURS PRN
Status: ON HOLD | COMMUNITY
End: 2024-06-19 | Stop reason: HOSPADM

## 2024-06-17 RX ORDER — HYDROMORPHONE HYDROCHLORIDE 1 MG/ML
0.25 INJECTION, SOLUTION INTRAMUSCULAR; INTRAVENOUS; SUBCUTANEOUS EVERY 5 MIN PRN
Status: DISCONTINUED | OUTPATIENT
Start: 2024-06-17 | End: 2024-06-17 | Stop reason: HOSPADM

## 2024-06-17 RX ORDER — CALCIUM CARBONATE 500(1250)
250 TABLET ORAL DAILY
Status: DISCONTINUED | OUTPATIENT
Start: 2024-06-17 | End: 2024-06-19 | Stop reason: HOSPADM

## 2024-06-17 RX ORDER — HYDROMORPHONE HYDROCHLORIDE 1 MG/ML
0.5 INJECTION, SOLUTION INTRAMUSCULAR; INTRAVENOUS; SUBCUTANEOUS EVERY 5 MIN PRN
Status: COMPLETED | OUTPATIENT
Start: 2024-06-17 | End: 2024-06-17

## 2024-06-17 RX ORDER — SODIUM CHLORIDE 9 MG/ML
INJECTION, SOLUTION INTRAVENOUS PRN
Status: DISCONTINUED | OUTPATIENT
Start: 2024-06-17 | End: 2024-06-17 | Stop reason: HOSPADM

## 2024-06-17 RX ORDER — SODIUM CHLORIDE 0.9 % (FLUSH) 0.9 %
5-40 SYRINGE (ML) INJECTION PRN
Status: DISCONTINUED | OUTPATIENT
Start: 2024-06-17 | End: 2024-06-19 | Stop reason: HOSPADM

## 2024-06-17 RX ORDER — KETOROLAC TROMETHAMINE 30 MG/ML
INJECTION, SOLUTION INTRAMUSCULAR; INTRAVENOUS PRN
Status: DISCONTINUED | OUTPATIENT
Start: 2024-06-17 | End: 2024-06-17 | Stop reason: SDUPTHER

## 2024-06-17 RX ORDER — SODIUM CHLORIDE 9 MG/ML
INJECTION, SOLUTION INTRAVENOUS CONTINUOUS
Status: DISCONTINUED | OUTPATIENT
Start: 2024-06-17 | End: 2024-06-18

## 2024-06-17 RX ORDER — ONDANSETRON 2 MG/ML
4 INJECTION INTRAMUSCULAR; INTRAVENOUS EVERY 6 HOURS PRN
Status: DISCONTINUED | OUTPATIENT
Start: 2024-06-17 | End: 2024-06-19 | Stop reason: HOSPADM

## 2024-06-17 RX ORDER — EZETIMIBE 10 MG/1
10 TABLET ORAL DAILY
Status: DISCONTINUED | OUTPATIENT
Start: 2024-06-17 | End: 2024-06-19 | Stop reason: HOSPADM

## 2024-06-17 RX ORDER — SENNOSIDES A AND B 8.6 MG/1
1 TABLET, FILM COATED ORAL DAILY PRN
Status: DISCONTINUED | OUTPATIENT
Start: 2024-06-17 | End: 2024-06-19 | Stop reason: HOSPADM

## 2024-06-17 RX ORDER — FENTANYL CITRATE 50 UG/ML
INJECTION, SOLUTION INTRAMUSCULAR; INTRAVENOUS PRN
Status: DISCONTINUED | OUTPATIENT
Start: 2024-06-17 | End: 2024-06-17 | Stop reason: SDUPTHER

## 2024-06-17 RX ORDER — SODIUM CHLORIDE 0.9 % (FLUSH) 0.9 %
5-40 SYRINGE (ML) INJECTION EVERY 12 HOURS SCHEDULED
Status: DISCONTINUED | OUTPATIENT
Start: 2024-06-17 | End: 2024-06-19 | Stop reason: HOSPADM

## 2024-06-17 RX ORDER — BISACODYL 5 MG/1
5 TABLET, DELAYED RELEASE ORAL DAILY
Status: DISCONTINUED | OUTPATIENT
Start: 2024-06-17 | End: 2024-06-19 | Stop reason: HOSPADM

## 2024-06-17 RX ORDER — ONDANSETRON 2 MG/ML
INJECTION INTRAMUSCULAR; INTRAVENOUS PRN
Status: DISCONTINUED | OUTPATIENT
Start: 2024-06-17 | End: 2024-06-17 | Stop reason: SDUPTHER

## 2024-06-17 RX ORDER — MIDAZOLAM HYDROCHLORIDE 1 MG/ML
INJECTION INTRAMUSCULAR; INTRAVENOUS PRN
Status: DISCONTINUED | OUTPATIENT
Start: 2024-06-17 | End: 2024-06-17 | Stop reason: SDUPTHER

## 2024-06-17 RX ORDER — LIDOCAINE HYDROCHLORIDE 10 MG/ML
INJECTION, SOLUTION INFILTRATION; PERINEURAL PRN
Status: DISCONTINUED | OUTPATIENT
Start: 2024-06-17 | End: 2024-06-17 | Stop reason: SDUPTHER

## 2024-06-17 RX ORDER — SERTRALINE HYDROCHLORIDE 100 MG/1
100 TABLET, FILM COATED ORAL DAILY
Status: DISCONTINUED | OUTPATIENT
Start: 2024-06-17 | End: 2024-06-19 | Stop reason: HOSPADM

## 2024-06-17 RX ORDER — SODIUM CHLORIDE 9 MG/ML
INJECTION, SOLUTION INTRAVENOUS PRN
Status: DISCONTINUED | OUTPATIENT
Start: 2024-06-17 | End: 2024-06-19 | Stop reason: HOSPADM

## 2024-06-17 RX ORDER — PROPOFOL 10 MG/ML
INJECTION, EMULSION INTRAVENOUS PRN
Status: DISCONTINUED | OUTPATIENT
Start: 2024-06-17 | End: 2024-06-17 | Stop reason: SDUPTHER

## 2024-06-17 RX ORDER — ALBUTEROL SULFATE 90 UG/1
2 AEROSOL, METERED RESPIRATORY (INHALATION) EVERY 4 HOURS PRN
Status: DISCONTINUED | OUTPATIENT
Start: 2024-06-17 | End: 2024-06-19 | Stop reason: HOSPADM

## 2024-06-17 RX ORDER — DIPHENHYDRAMINE HYDROCHLORIDE 50 MG/ML
12.5 INJECTION INTRAMUSCULAR; INTRAVENOUS
Status: DISCONTINUED | OUTPATIENT
Start: 2024-06-17 | End: 2024-06-17 | Stop reason: HOSPADM

## 2024-06-17 RX ORDER — LISINOPRIL 10 MG/1
10 TABLET ORAL DAILY
Status: DISCONTINUED | OUTPATIENT
Start: 2024-06-17 | End: 2024-06-19 | Stop reason: HOSPADM

## 2024-06-17 RX ORDER — EPHEDRINE SULFATE 50 MG/ML
INJECTION INTRAVENOUS PRN
Status: DISCONTINUED | OUTPATIENT
Start: 2024-06-17 | End: 2024-06-17 | Stop reason: SDUPTHER

## 2024-06-17 RX ORDER — DEXAMETHASONE SODIUM PHOSPHATE 10 MG/ML
INJECTION, SOLUTION INTRAMUSCULAR; INTRAVENOUS PRN
Status: DISCONTINUED | OUTPATIENT
Start: 2024-06-17 | End: 2024-06-17 | Stop reason: SDUPTHER

## 2024-06-17 RX ORDER — ROCURONIUM BROMIDE 10 MG/ML
INJECTION, SOLUTION INTRAVENOUS PRN
Status: DISCONTINUED | OUTPATIENT
Start: 2024-06-17 | End: 2024-06-17 | Stop reason: SDUPTHER

## 2024-06-17 RX ORDER — ALPRAZOLAM 1 MG/1
2 TABLET ORAL NIGHTLY PRN
Status: DISCONTINUED | OUTPATIENT
Start: 2024-06-17 | End: 2024-06-19 | Stop reason: HOSPADM

## 2024-06-17 RX ORDER — NALOXONE HYDROCHLORIDE 0.4 MG/ML
INJECTION, SOLUTION INTRAMUSCULAR; INTRAVENOUS; SUBCUTANEOUS PRN
Status: DISCONTINUED | OUTPATIENT
Start: 2024-06-17 | End: 2024-06-17 | Stop reason: HOSPADM

## 2024-06-17 RX ORDER — PANTOPRAZOLE SODIUM 40 MG/1
40 TABLET, DELAYED RELEASE ORAL
Status: DISCONTINUED | OUTPATIENT
Start: 2024-06-18 | End: 2024-06-19 | Stop reason: HOSPADM

## 2024-06-17 RX ORDER — HYDROCHLOROTHIAZIDE 25 MG/1
25 TABLET ORAL DAILY
Status: DISCONTINUED | OUTPATIENT
Start: 2024-06-17 | End: 2024-06-19 | Stop reason: HOSPADM

## 2024-06-17 RX ORDER — METOCLOPRAMIDE HYDROCHLORIDE 5 MG/ML
10 INJECTION INTRAMUSCULAR; INTRAVENOUS
Status: DISCONTINUED | OUTPATIENT
Start: 2024-06-17 | End: 2024-06-17 | Stop reason: HOSPADM

## 2024-06-17 RX ADMIN — HYDROMORPHONE HYDROCHLORIDE 0.5 MG: 1 INJECTION, SOLUTION INTRAMUSCULAR; INTRAVENOUS; SUBCUTANEOUS at 12:10

## 2024-06-17 RX ADMIN — KETOROLAC TROMETHAMINE 30 MG: 30 INJECTION, SOLUTION INTRAMUSCULAR; INTRAVENOUS at 11:39

## 2024-06-17 RX ADMIN — SODIUM CHLORIDE, SODIUM LACTATE, POTASSIUM CHLORIDE, AND CALCIUM CHLORIDE: 600; 310; 30; 20 INJECTION, SOLUTION INTRAVENOUS at 06:40

## 2024-06-17 RX ADMIN — SODIUM CHLORIDE, PRESERVATIVE FREE 10 ML: 5 INJECTION INTRAVENOUS at 21:06

## 2024-06-17 RX ADMIN — FENTANYL CITRATE 100 MCG: 0.05 INJECTION, SOLUTION INTRAMUSCULAR; INTRAVENOUS at 07:34

## 2024-06-17 RX ADMIN — Medication 5000 UNITS: at 21:06

## 2024-06-17 RX ADMIN — BISACODYL 5 MG: 5 TABLET, COATED ORAL at 14:06

## 2024-06-17 RX ADMIN — SODIUM CHLORIDE, SODIUM LACTATE, POTASSIUM CHLORIDE, AND CALCIUM CHLORIDE: 600; 310; 30; 20 INJECTION, SOLUTION INTRAVENOUS at 09:32

## 2024-06-17 RX ADMIN — FENTANYL CITRATE 50 MCG: 0.05 INJECTION, SOLUTION INTRAMUSCULAR; INTRAVENOUS at 11:02

## 2024-06-17 RX ADMIN — METHOCARBAMOL TABLETS 750 MG: 750 TABLET, COATED ORAL at 14:07

## 2024-06-17 RX ADMIN — EPHEDRINE SULFATE 10 MG: 50 INJECTION INTRAVENOUS at 07:34

## 2024-06-17 RX ADMIN — DEXAMETHASONE SODIUM PHOSPHATE 10 MG: 10 INJECTION, SOLUTION INTRAMUSCULAR; INTRAVENOUS at 07:47

## 2024-06-17 RX ADMIN — FENTANYL CITRATE 50 MCG: 0.05 INJECTION, SOLUTION INTRAMUSCULAR; INTRAVENOUS at 10:53

## 2024-06-17 RX ADMIN — SERTRALINE 100 MG: 100 TABLET, FILM COATED ORAL at 21:06

## 2024-06-17 RX ADMIN — FENTANYL CITRATE 50 MCG: 0.05 INJECTION, SOLUTION INTRAMUSCULAR; INTRAVENOUS at 10:27

## 2024-06-17 RX ADMIN — ROCURONIUM BROMIDE 60 MG: 10 INJECTION, SOLUTION INTRAVENOUS at 07:34

## 2024-06-17 RX ADMIN — ONDANSETRON 4 MG: 2 INJECTION INTRAMUSCULAR; INTRAVENOUS at 11:12

## 2024-06-17 RX ADMIN — CALCIUM 250 MG: 500 TABLET ORAL at 14:07

## 2024-06-17 RX ADMIN — Medication: at 13:12

## 2024-06-17 RX ADMIN — ROCURONIUM BROMIDE 10 MG: 10 INJECTION, SOLUTION INTRAVENOUS at 08:46

## 2024-06-17 RX ADMIN — HYDROMORPHONE HYDROCHLORIDE 0.5 MG: 1 INJECTION, SOLUTION INTRAMUSCULAR; INTRAVENOUS; SUBCUTANEOUS at 12:20

## 2024-06-17 RX ADMIN — ROCURONIUM BROMIDE 20 MG: 10 INJECTION, SOLUTION INTRAVENOUS at 10:56

## 2024-06-17 RX ADMIN — HYDROMORPHONE HYDROCHLORIDE 0.5 MG: 1 INJECTION, SOLUTION INTRAMUSCULAR; INTRAVENOUS; SUBCUTANEOUS at 11:59

## 2024-06-17 RX ADMIN — Medication 1000 MG: at 06:48

## 2024-06-17 RX ADMIN — SODIUM CHLORIDE: 9 INJECTION, SOLUTION INTRAVENOUS at 16:00

## 2024-06-17 RX ADMIN — HYDROMORPHONE HYDROCHLORIDE 0.5 MG: 1 INJECTION, SOLUTION INTRAMUSCULAR; INTRAVENOUS; SUBCUTANEOUS at 12:33

## 2024-06-17 RX ADMIN — EPHEDRINE SULFATE 5 MG: 50 INJECTION INTRAVENOUS at 09:51

## 2024-06-17 RX ADMIN — HYDROCHLOROTHIAZIDE 25 MG: 25 TABLET ORAL at 14:07

## 2024-06-17 RX ADMIN — HYDROMORPHONE HYDROCHLORIDE 0.5 MG: 1 INJECTION, SOLUTION INTRAMUSCULAR; INTRAVENOUS; SUBCUTANEOUS at 11:29

## 2024-06-17 RX ADMIN — ROCURONIUM BROMIDE 20 MG: 10 INJECTION, SOLUTION INTRAVENOUS at 09:04

## 2024-06-17 RX ADMIN — FENTANYL CITRATE 50 MCG: 0.05 INJECTION, SOLUTION INTRAMUSCULAR; INTRAVENOUS at 09:34

## 2024-06-17 RX ADMIN — PROPOFOL 150 MG: 10 INJECTION, EMULSION INTRAVENOUS at 07:34

## 2024-06-17 RX ADMIN — EZETIMIBE 10 MG: 10 TABLET ORAL at 21:06

## 2024-06-17 RX ADMIN — FENTANYL CITRATE 50 MCG: 0.05 INJECTION, SOLUTION INTRAMUSCULAR; INTRAVENOUS at 08:59

## 2024-06-17 RX ADMIN — LIDOCAINE HYDROCHLORIDE 50 MG: 10 INJECTION, SOLUTION INFILTRATION; PERINEURAL at 07:34

## 2024-06-17 RX ADMIN — LISINOPRIL 10 MG: 10 TABLET ORAL at 14:07

## 2024-06-17 RX ADMIN — SUGAMMADEX 300 MG: 100 INJECTION, SOLUTION INTRAVENOUS at 11:39

## 2024-06-17 RX ADMIN — MIDAZOLAM 2 MG: 1 INJECTION INTRAMUSCULAR; INTRAVENOUS at 07:31

## 2024-06-17 RX ADMIN — HYDROMORPHONE HYDROCHLORIDE 0.5 MG: 1 INJECTION, SOLUTION INTRAMUSCULAR; INTRAVENOUS; SUBCUTANEOUS at 11:44

## 2024-06-17 RX ADMIN — METHOCARBAMOL TABLETS 750 MG: 750 TABLET, COATED ORAL at 21:06

## 2024-06-17 RX ADMIN — ROCURONIUM BROMIDE 40 MG: 10 INJECTION, SOLUTION INTRAVENOUS at 08:05

## 2024-06-17 ASSESSMENT — PAIN SCALES - GENERAL
PAINLEVEL_OUTOF10: 8
PAINLEVEL_OUTOF10: 9
PAINLEVEL_OUTOF10: 9
PAINLEVEL_OUTOF10: 3
PAINLEVEL_OUTOF10: 9
PAINLEVEL_OUTOF10: 9
PAINLEVEL_OUTOF10: 8

## 2024-06-17 ASSESSMENT — PAIN DESCRIPTION - LOCATION
LOCATION: BACK

## 2024-06-17 ASSESSMENT — PAIN DESCRIPTION - DESCRIPTORS: DESCRIPTORS: SHARP

## 2024-06-17 ASSESSMENT — PAIN DESCRIPTION - PAIN TYPE: TYPE: SURGICAL PAIN

## 2024-06-17 ASSESSMENT — PAIN DESCRIPTION - ORIENTATION: ORIENTATION: LOWER

## 2024-06-17 ASSESSMENT — PAIN - FUNCTIONAL ASSESSMENT: PAIN_FUNCTIONAL_ASSESSMENT: PREVENTS OR INTERFERES SOME ACTIVE ACTIVITIES AND ADLS

## 2024-06-17 ASSESSMENT — LIFESTYLE VARIABLES: SMOKING_STATUS: 1

## 2024-06-17 NOTE — H&P
[facility]  H&P      Chief Complaint   Patient presents with    Follow-up     Patient saw Dr Oh yesterday. Dr Oh will not do SI fusion due to Dr Tobar previous spinal surgery and issues arrive at level close to last surgery level.        HISTORY OF PRESENT ILLNESS:      Ludmila Rodríguez is a 58 y.o. female who underwent a TLIF of L3-4, L4-5 for spondylolisthesis of L3-4 on 8/05/2020.  Prior to surgery she complained of low back pain and bilateral lower extremity pain that radiated into the buttocks and posterior thighs. She reported 60% was back pain and 40% was BLE pain.  Following surgery she did very well with very little back and no radicular pain.      Today Mrs. Rodríguez returns to clinic to discuss a newer onset pain.  She complains of bilateral hips, upper buttock and posterior thigh pains.  This started about 2 years ago and has worsened over the last 6 months.  Leg pains worse with ambulation.  Will have to lean on a cart when in a department store.  She has been in our pain management, they felt she may have had SI joint dysfunction, was referred to our colleague Dr. Derek Oh where he felt her symptoms were stemming from adjacent segment disease and sent her back to us.  She has recently discharged herself from our pain management team due to hospital billing costs.      She has been on Norco 10 QID, robaxin, celebrex.  Has attempted injections which she states flared up her pain.     She has had multiple fractures of her wrist and foot over the last 4 years.  Her last fall was 4 months ago.  She states Dr. Gardner will be starting her on Prolia.      She is a smoker.  She is taking ASA 81mg several times per week.     Has a history of lapband surgery 13 years ago.      Past Medical History:   Diagnosis Date    Anxiety     Asthma     Back pain     Colon polyps     DDD (degenerative disc disease), cervical     Depression     Essential hypertension 10/19/2016    GERD (gastroesophageal  her pain and the fact that non-operative treatments have failed.      She will need removal of hardware L3-L5 with TLIF of L2-3 using minimally invasive technique using O-arm (June 17)    She will need to stop her ASA 5-7 days prior to surgery and may resume 24 hours following       We discussed risks, complications and expectations, including but not limited to infection, paralysis, bowel and bladder dysfunction, need for revision procedure, persistent pain, spinal fluid leak, stroke and death.  In addition, the benefits of the surgery were thoroughly discussed and the patient demonstrated a deep understanding. The patient wishes to proceed with surgical intervention.         ICD-10-CM    1. Spinal stenosis of lumbar region with neurogenic claudication  M48.062 APTT     CBC     Comprehensive Metabolic Panel     EKG 12 Lead     Protime-INR     Type and Screen     Urinalysis with Reflex to Culture     XR CHEST (2 VW)      2. Spondylolisthesis at L2-L3 level  M43.16 APTT     CBC     Comprehensive Metabolic Panel     EKG 12 Lead     Protime-INR     Type and Screen     Urinalysis with Reflex to Culture     XR CHEST (2 VW)      3. DDD (degenerative disc disease), lumbar  M51.36       4. Chronic midline low back pain with bilateral sciatica  M54.41     M54.42     G89.29              Cliff Tobar DO

## 2024-06-17 NOTE — PROGRESS NOTES
4 Eyes Skin Assessment     NAME:  Ludmila Rodríguez  YOB: 1965  MEDICAL RECORD NUMBER:  [id number]    The patient is being assessed for  Admission    I agree that at least one RN has performed a thorough Head to Toe Skin Assessment on the patient. ALL assessment sites listed below have been assessed.      Areas assessed by both nurses:    Head, Face, Ears, Shoulders, Back, Chest, Arms, Elbows, Hands, Sacrum. Buttock, Coccyx, Ischium, and Legs. Feet and Heels        Does the Patient have a Wound? No noted wound(s)       Julian Prevention initiated by RN: No  Wound Care Orders initiated by RN: No    Pressure Injury (Stage 3,4, Unstageable, DTI, NWPT, and Complex wounds) if present, place Wound referral order by RN under : No    New Ostomies, if present place, Ostomy referral order under : No     Nurse 1 eSignature: Electronically signed by Sherwin Drummond RN on 6/17/24 at 2:32 PM CDT    **SHARE this note so that the co-signing nurse can place an eSignature**    Nurse 2 eSignature: Electronically signed by Maida Clemente RN on 6/17/24 at 3:24 PM CDT

## 2024-06-17 NOTE — BRIEF OP NOTE
Brief Postoperative Note      Patient: Ludmila Rodríguez  YOB: 1965  MRN: [id number]    Date of Procedure: 6/17/2024    Pre-Op Diagnosis Codes:     * Neurogenic claudication due to lumbar spinal stenosis [M48.062]     * Spondylolisthesis of lumbar region [M43.16]    Post-Op Diagnosis: Same       Procedure(s):  Removal of hardware L3-L5 with TLIF of L2-3 using minimally invasive technique using O-arm    Surgeon(s):  Cliff Tobar DO    Assistant:  * No surgical staff found *    Anesthesia: General    Estimated Blood Loss (mL): less than 50     Complications: None    Specimens:   * No specimens in log *    Implants:  Implant Name Type Inv. Item Serial No.  Lot No. LRB No. Used Action   ALLOGRAFT BNE CHIP 1-4 MM 15 CC FD CRUSH CANC READIGRAFT - [id number]  ALLOGRAFT BNE CHIP 1-4 MM 15 CC FD CRUSH CANC READIGRAFT  [facility] TISSUE [facility]- [id number] Left 1 Implanted   KIT GRFT SUB 2XSM 0.7ML 1.05MG RHBMP-2 FRZ DRY ST H2O CLLGN - [id number]  KIT GRFT SUB 2XSM 0.7ML 1.05MG RHBMP-2 FRZ DRY ST H2O CLLGN  [employer] SPINALGRAFT TECH-WD  Left 1 Implanted   SCREW SPNL L50MM DIA6.5MM HI STRENGTH LO PROF MULTIAXIAL - [id number]  SCREW SPNL L50MM DIA6.5MM HI STRENGTH LO PROF MULTIAXIAL  [employer] SOFAMOR DANEK-WD  Left 4 Implanted   SPACER SPNL SHT 7 MM CATALYFT PL - [id number]  SPACER SPNL SHT 7 MM CATALYFT PL  [employer] SOFAMOR DANEK-WD  Left 1 Implanted   SERINA SPNL 5.5X60 MM PERC COCRMOLY CD HORZ SOLERA VOYAGER - [id number]  SERINA SPNL 5.5X60 MM PERC COCRMOLY CD HORZ SOLERA VOYAGER  [employer] SOFAMOR DANEK-WD  Left 2 Implanted   SET SCR SPNL 55 6MM TREVOR FEN MULTIAXIAL CDH SOLERA VOYAGER - [id number]  SET SCR SPNL 55 6MM TREVOR FEN MULTIAXIAL CDH SOLERA VOYAGER  [employer] SOFAMOR DANEK-WD  Left 4 Implanted         Drains:   Urinary Catheter 06/17/24 Belle (Active)       Findings:  Infection Present At Time Of Surgery (PATOS) (choose all levels that have infection present):  No infection

## 2024-06-17 NOTE — OP NOTE
NEUROSURGICAL DEPARTMENT OPERATIVE REPORT    NAME OF SURGEON:  LYDIA TOBAR DO    DATE OF SERVICE: 6/17/2024    PREOPERATIVE DIAGNOSES   1.  L2- L3 spinal stenosis with bilateral lumbar radiculopathies recalcitrant  to nonoperative treatment.  2.  Bilateral foraminal stenosis, L2- L3.  3.  L2- L3 spondylolisthesis.  4.  History of previous posterior instrumented spinal fusion L3-L5.    POSTOPERATIVE DIAGNOSES   SAME    OPERATIVE PROCEDURES  1.  Right L2 and L3 bull-laminotomies with right L2- L3 complete facetectomy and  right foraminotomy for decompression of the thecal sac and nerve  root utilizing the operative microscope, the tubular retractor, and  microdisection technique.  2.  L2- L3 posterior lumbar interbody fusion performed via the transforaminal  route utilizing autograft bone, BMP and allograft bone.  3.  Insertion of an intervertebral biochemical device, L2- L3 Catalyft™   4.  L2- L3 internal fixation utilizing percutaneous pedicle screws and rods.  5.  Use of stereotactic, computer-assisted spinal navigation (O-arm™)    SURGEON  Lydia Tobar DO    FIRST ASSIST   CHELLE Lawrence    HISTORY AND INDICATIONS  58-year-old female very familiar to our service due to a previous TLIF L3-L5 in August 2020.  Following surgery she did well with little back pain and no significant lower extremity pains, however, approximately 2 years ago she started noticing back, bilateral hips, upper buttock and posterior thigh pain.  Over the last 6 months her pain progressed.  She underwent nonoperative treatments without any significant success.    Upon neurologic examination she was noted to have 5/5 strength in all groups.  She had absent bilateral patellar and Achilles reflexes in the lower extremities.  She had no deficits to light touch pinprick sensation.    MRI of the lumbar spine revealed evidence of previous posterior instrumented fusion L3-L5.  At L2-3, the level above her fusion, there was noted to be severe  as a high-speed drill.  The bone removed was saved for later use.  The ligamentum flavum was opened and was resected with fine-angled Kerrison rongeur.  This allowed for a very nice decompression of the dural sac as well as complete decompression of the root.    At this point, the nerve roots were easily explored with a ball-tip probe and were found to be free from any residual compression.  The dural sac was now carefully protected and retracted with the nerve root retractor.        The disk space was entered at L2- L3 with a microkinfe.  Disk material was removed with various rongeurs and curettes.  The cartilaginous portion of the endplates at L2 and L3 were removed utilizing reverse-angle curettes.  The disc space was irrigated with antibiotic saline.      The Catalyft™ trial was then placed and the size of 7 x 23.3 mm was determined to be the correct size. The previously harvested autograft was packed into the Catalyft™ interbody spacer.  Crushed cancellous was then impacted into the anterior portion of the interspace at L2- L3.  An extra small BMP sponge rolled in autograft was also placed in the anterior portion of the L2- L3 interspace.  The implant was navigated into the interspace without any issues.      The nerve roots were reexplored and found to be free from any  residual compression.  Additional bone was placed.    The combination of distraction and placement of the interbody graft resulted  in a very nice open reduction to the patient's L2- L3 spondylolisthesis and restoration of the L2-3 disc height..      The reaming pedicles screws on the right were then placed using the previously positioned K-wires.  A 6.5 x 50 mm pedicle screw was placed at L2 and a 6.5 x 50 mm pedicle screw was placed at L3.  The ned on the right side was placed and secured down with top-loading nuts and was final tightened.     Using the techniques as described above, a second 3D image was obtained by the O-arm™ and it was

## 2024-06-17 NOTE — ANESTHESIA POSTPROCEDURE EVALUATION
Department of Anesthesiology  Postprocedure Note    Patient: Ludmila Rodríguez  MRN: [id number]  YOB: 1965  Date of evaluation: 6/17/2024    Procedure Summary       Date: 06/17/24 Room / Location: [address]    Anesthesia Start: 0731 Anesthesia Stop: 1150    Procedure: Removal of hardware L3-L5 with TLIF of L2-3 using minimally invasive technique using O-arm (Left) Diagnosis:       Neurogenic claudication due to lumbar spinal stenosis      Spondylolisthesis of lumbar region      (Neurogenic claudication due to lumbar spinal stenosis [M48.062])      (Spondylolisthesis of lumbar region [M43.16])    Surgeons: Cliff Tobar DO Responsible Provider: Fahad Omer APRN - CRNA    Anesthesia Type: general ASA Status: 3            Anesthesia Type: No value filed.    Rossy Phase I:      Rossy Phase II:      Anesthesia Post Evaluation    Patient location during evaluation: PACU  Patient participation: complete - patient participated  Level of consciousness: awake  Pain score: 0  Airway patency: patent  Nausea & Vomiting: no nausea and no vomiting  Cardiovascular status: hemodynamically stable  Respiratory status: acceptable, spontaneous ventilation and room air  Hydration status: euvolemic  Pain management: adequate    No notable events documented.

## 2024-06-17 NOTE — ANESTHESIA PRE PROCEDURE
Department of Anesthesiology  Preprocedure Note       Name:  Ludmila Rodríguez   Age:  58 y.o.  :  1965                                          MRN:  [id number]         Date:  2024      Surgeon: Surgeon(s):  Cliff Tobar DO    Procedure: Procedure(s):  TLIF L3-4, L4-5    Medications prior to admission:   Prior to Admission medications    Medication Sig Start Date End Date Taking? Authorizing Provider   HYDROcodone-acetaminophen (NORCO)  MG per tablet Take 1 tablet by mouth every 6 hours as needed for Pain (James Saldaña MD). Max Daily Amount: 4 tablets   Yes Anand Singh MD   Calcium 250 MG CAPS Take 1 capsule by mouth daily    Anand Singh MD   methocarbamol (ROBAXIN-750) 750 MG tablet Take 1 tablet by mouth 4 times daily as needed (muscle spasm)  Patient taking differently: Take 1 tablet by mouth 2 times daily as needed (muscle spasm) 2/5/24 8/3/24  Mishel Camacho APRN - CNP   celecoxib (CELEBREX) 100 MG capsule Take 1 capsule by mouth 2 times daily 24   Mishel Camacho APRN - CNP   VITAMIN D, CHOLECALCIFEROL, PO Take 5,000 Units by mouth nightly    Anand Singh MD   ezetimibe (ZETIA) 10 MG tablet Take 1 tablet by mouth daily    Anand Singh MD   albuterol sulfate HFA (PROVENTIL;VENTOLIN;PROAIR) 108 (90 Base) MCG/ACT inhaler Inhale 2 puffs into the lungs every 4 hours as needed 23   Anand Singh MD   sertraline (ZOLOFT) 100 MG tablet Take 1 tablet by mouth daily 23   Anand Singh MD   naloxone 4 MG/0.1ML LIQD nasal spray 1 spray by Nasal route as needed 3/13/23   Anand Singh MD   lisinopril (PRINIVIL;ZESTRIL) 10 MG tablet Take 1 tablet by mouth daily 22   Anand Singh MD   hydroCHLOROthiazide (HYDRODIURIL) 25 MG tablet Take 1 tablet by mouth daily 22   Anand Singh MD   omeprazole (PRILOSEC) 40 MG delayed release capsule Take 1 capsule by mouth at bedtime    Anand Singh MD

## 2024-06-18 LAB
ANION GAP SERPL CALCULATED.3IONS-SCNC: 14 MMOL/L (ref 7–19)
BASOPHILS # BLD: 0 K/UL (ref 0–0.2)
BASOPHILS NFR BLD: 0.2 % (ref 0–1)
BUN SERPL-MCNC: 29 MG/DL (ref 8–23)
CALCIUM SERPL-MCNC: 9.1 MG/DL (ref 8.8–10.2)
CHLORIDE SERPL-SCNC: 96 MMOL/L (ref 98–111)
CO2 SERPL-SCNC: 26 MMOL/L (ref 22–29)
CREAT SERPL-MCNC: 0.8 MG/DL (ref 0.5–0.9)
EOSINOPHIL # BLD: 0 K/UL (ref 0–0.6)
EOSINOPHIL NFR BLD: 0 % (ref 0–5)
ERYTHROCYTE [DISTWIDTH] IN BLOOD BY AUTOMATED COUNT: 13.5 % (ref 11.5–14.5)
GLUCOSE SERPL-MCNC: 111 MG/DL (ref 74–109)
HCT VFR BLD AUTO: 37.4 % (ref 37–47)
HGB BLD-MCNC: 11.4 G/DL (ref 12–16)
IMM GRANULOCYTES # BLD: 0.1 K/UL
LYMPHOCYTES # BLD: 0.7 K/UL (ref 1.1–4.5)
LYMPHOCYTES NFR BLD: 3.9 % (ref 20–40)
MCH RBC QN AUTO: 30.9 PG (ref 27–31)
MCHC RBC AUTO-ENTMCNC: 30.5 G/DL (ref 33–37)
MCV RBC AUTO: 101.4 FL (ref 81–99)
MONOCYTES # BLD: 1.2 K/UL (ref 0–0.9)
MONOCYTES NFR BLD: 6.4 % (ref 0–10)
NEUTROPHILS # BLD: 16.5 K/UL (ref 1.5–7.5)
NEUTS SEG NFR BLD: 88.9 % (ref 50–65)
PLATELET # BLD AUTO: 338 K/UL (ref 130–400)
PMV BLD AUTO: 9.6 FL (ref 9.4–12.3)
POTASSIUM SERPL-SCNC: 4.5 MMOL/L (ref 3.5–5)
RBC # BLD AUTO: 3.69 M/UL (ref 4.2–5.4)
SODIUM SERPL-SCNC: 136 MMOL/L (ref 136–145)
WBC # BLD AUTO: 18.5 K/UL (ref 4.8–10.8)

## 2024-06-18 PROCEDURE — 85025 COMPLETE CBC W/AUTO DIFF WBC: CPT

## 2024-06-18 PROCEDURE — 97530 THERAPEUTIC ACTIVITIES: CPT

## 2024-06-18 PROCEDURE — 2700000000 HC OXYGEN THERAPY PER DAY

## 2024-06-18 PROCEDURE — 97535 SELF CARE MNGMENT TRAINING: CPT

## 2024-06-18 PROCEDURE — 36415 COLL VENOUS BLD VENIPUNCTURE: CPT

## 2024-06-18 PROCEDURE — 1200000000 HC SEMI PRIVATE

## 2024-06-18 PROCEDURE — 80048 BASIC METABOLIC PNL TOTAL CA: CPT

## 2024-06-18 PROCEDURE — 97165 OT EVAL LOW COMPLEX 30 MIN: CPT

## 2024-06-18 PROCEDURE — 97161 PT EVAL LOW COMPLEX 20 MIN: CPT

## 2024-06-18 PROCEDURE — 94760 N-INVAS EAR/PLS OXIMETRY 1: CPT

## 2024-06-18 PROCEDURE — 97116 GAIT TRAINING THERAPY: CPT

## 2024-06-18 PROCEDURE — 6370000000 HC RX 637 (ALT 250 FOR IP): Performed by: NEUROLOGICAL SURGERY

## 2024-06-18 PROCEDURE — G0378 HOSPITAL OBSERVATION PER HR: HCPCS

## 2024-06-18 PROCEDURE — 99024 POSTOP FOLLOW-UP VISIT: CPT | Performed by: NURSE PRACTITIONER

## 2024-06-18 PROCEDURE — 96366 THER/PROPH/DIAG IV INF ADDON: CPT

## 2024-06-18 PROCEDURE — 96365 THER/PROPH/DIAG IV INF INIT: CPT

## 2024-06-18 RX ORDER — HYDROMORPHONE HYDROCHLORIDE 1 MG/ML
1 INJECTION, SOLUTION INTRAMUSCULAR; INTRAVENOUS; SUBCUTANEOUS
Status: DISCONTINUED | OUTPATIENT
Start: 2024-06-18 | End: 2024-06-19 | Stop reason: HOSPADM

## 2024-06-18 RX ADMIN — SERTRALINE 100 MG: 100 TABLET, FILM COATED ORAL at 21:37

## 2024-06-18 RX ADMIN — METHOCARBAMOL TABLETS 750 MG: 750 TABLET, COATED ORAL at 21:37

## 2024-06-18 RX ADMIN — BISACODYL 5 MG: 5 TABLET, COATED ORAL at 07:45

## 2024-06-18 RX ADMIN — HYDROCODONE BITARTRATE AND ACETAMINOPHEN 1 TABLET: 10; 325 TABLET ORAL at 11:16

## 2024-06-18 RX ADMIN — HYDROCODONE BITARTRATE AND ACETAMINOPHEN 1 TABLET: 10; 325 TABLET ORAL at 23:06

## 2024-06-18 RX ADMIN — HYDROCODONE BITARTRATE AND ACETAMINOPHEN 1 TABLET: 10; 325 TABLET ORAL at 18:44

## 2024-06-18 RX ADMIN — HYDROCHLOROTHIAZIDE 25 MG: 25 TABLET ORAL at 07:45

## 2024-06-18 RX ADMIN — LISINOPRIL 10 MG: 10 TABLET ORAL at 07:45

## 2024-06-18 RX ADMIN — Medication 5000 UNITS: at 21:37

## 2024-06-18 RX ADMIN — METHOCARBAMOL TABLETS 750 MG: 750 TABLET, COATED ORAL at 13:58

## 2024-06-18 RX ADMIN — CALCIUM 250 MG: 500 TABLET ORAL at 07:45

## 2024-06-18 RX ADMIN — HYDROCODONE BITARTRATE AND ACETAMINOPHEN 1 TABLET: 10; 325 TABLET ORAL at 15:36

## 2024-06-18 RX ADMIN — ALPRAZOLAM 2 MG: 1 TABLET ORAL at 21:44

## 2024-06-18 RX ADMIN — EZETIMIBE 10 MG: 10 TABLET ORAL at 21:37

## 2024-06-18 ASSESSMENT — PAIN DESCRIPTION - LOCATION
LOCATION: BACK

## 2024-06-18 ASSESSMENT — PAIN SCALES - GENERAL
PAINLEVEL_OUTOF10: 5
PAINLEVEL_OUTOF10: 8
PAINLEVEL_OUTOF10: 7
PAINLEVEL_OUTOF10: 8
PAINLEVEL_OUTOF10: 3
PAINLEVEL_OUTOF10: 8

## 2024-06-18 ASSESSMENT — PAIN DESCRIPTION - ORIENTATION
ORIENTATION: MID
ORIENTATION: MID

## 2024-06-18 ASSESSMENT — PAIN DESCRIPTION - DESCRIPTORS
DESCRIPTORS: ACHING
DESCRIPTORS: ACHING

## 2024-06-18 NOTE — PROGRESS NOTES
Occupational Therapy Initial Assessment  Date: 2024   Patient Name: Ludmila Rodríguez  MRN: [id number]     : 1965    Date of Service: 2024    Discharge Recommendations:  Home independently       Assessment   Assessment: Evaluation completed and tx initiated.  All education completed this visit.  No barriers to stated discharge plan identified. Has good support.  States she has less pain than prior to surgery  Treatment Diagnosis: TLIF L2-3  REQUIRES OT FOLLOW-UP: Yes  Activity Tolerance  Activity Tolerance: Patient Tolerated treatment well           Patient Diagnosis(es): There were no encounter diagnoses.   has a past medical history of Anxiety, Asthma, Back pain, Colon polyps, DDD (degenerative disc disease), cervical, Depression, Essential hypertension, GERD (gastroesophageal reflux disease), Hiatal hernia, Hyperlipidemia, Post-menopausal, Shoulder pain, and Tobacco abuse.   has a past surgical history that includes Lap Band (2009); hernia repair; Lumbar spine surgery (N/A, 2020); Cholecystectomy; joint replacement (Right); skin biopsy (2023); and fracture surgery (Bilateral, 2023).    Treatment Diagnosis: TLIF L2-3      Restrictions  Restrictions/Precautions  Restrictions/Precautions: Fall Risk  Required Braces or Orthoses?: Yes  Required Braces or Orthoses  Spinal: Lumbar Corset  Spinal Other: LSO  Position Activity Restriction  Spinal Precautions: No Bending, No Lifting, No Twisting    Subjective   General  Chart Reviewed: Yes  Patient assessed for rehabilitation services?: Yes  Family / Caregiver Present: No  Pain Screening  Pain at present: 0  Scale Used: Numeric Score  Intervention List: Patient able to continue with treatment  Comments / Details: No pain with activity  Vital Signs  Temp: 97 °F (36.1 °C)  Temp Source: Temporal  Pulse: 70  Heart Rate Source: Monitor  Respirations: 14  BP: (!) 111/58  MAP (Calculated): 76  MAP (mmHg): 75  BP Location: Right upper arm  Patient  and no safety issues.  Instructed in AE use for sock donning and doffing which patient was able to demo back effectively.  Patient able to restate back protocols.   (30 mins)          Collette Guaman OT/CAMILO  Electronically signed by Collette Guaman OT on 6/18/2024 at 9:34 AM

## 2024-06-18 NOTE — PLAN OF CARE
Problem: Discharge Planning  Goal: Discharge to home or other facility with appropriate resources  6/18/2024 0225 by Denia Baker LPN  Outcome: Progressing  Flowsheets (Taken 6/18/2024 0125)  Discharge to home or other facility with appropriate resources:   Identify barriers to discharge with patient and caregiver   Arrange for needed discharge resources and transportation as appropriate   Identify discharge learning needs (meds, wound care, etc)   Refer to discharge planning if patient needs post-hospital services based on physician order or complex needs related to functional status, cognitive ability or social support system  6/17/2024 1715 by Bridgett Collazo RN  Outcome: Progressing     Problem: Pain  Goal: Verbalizes/displays adequate comfort level or baseline comfort level  6/18/2024 0225 by Denia Baker LPN  Outcome: Progressing  6/17/2024 1715 by Bridgett Collazo RN  Outcome: Progressing  Flowsheets (Taken 6/17/2024 1532)  Verbalizes/displays adequate comfort level or baseline comfort level:   Encourage patient to monitor pain and request assistance   Assess pain using appropriate pain scale   Administer analgesics based on type and severity of pain and evaluate response   Implement non-pharmacological measures as appropriate and evaluate response     Problem: ABCDS Injury Assessment  Goal: Absence of physical injury  6/18/2024 0225 by Denia Baker LPN  Outcome: Progressing  6/17/2024 1715 by Bridgett Collazo RN  Outcome: Progressing     Problem: Skin/Tissue Integrity - Adult  Goal: Skin integrity remains intact  6/18/2024 0225 by Denia Baker LPN  Outcome: Progressing  Flowsheets (Taken 6/18/2024 0125)  Skin Integrity Remains Intact:   Monitor for areas of redness and/or skin breakdown   Assess vascular access sites hourly  6/17/2024 1715 by Bridgett Collazo RN  Outcome: Progressing  Goal: Incisions, wounds, or drain sites healing without S/S of infection  6/18/2024 0225 by Denia Baker LPN  Outcome:

## 2024-06-18 NOTE — PROGRESS NOTES
[facility]  Post Op   Daily Progress Note        SUBJECTIVE:  Patient seen and examined.  Mrs. Rodríguez is now s/p Removal of hardware L3-L5 with TLIF of L2-3 using minimally invasive technique using O-arm on 6/17/2024 and is POD #1.  She is having typical post-operative pain.  States her PCA and O2 machine beeped all night and she could not get good rest.  She has no leg pain today.      Past Medical History:   Diagnosis Date    Anxiety     Asthma     Back pain     Colon polyps     DDD (degenerative disc disease), cervical     Depression     Essential hypertension 10/19/2016    sees [facility] cardiology yearly    GERD (gastroesophageal reflux disease)     Hiatal hernia     Hyperlipidemia     Post-menopausal     Shoulder pain     Tobacco abuse 10/19/2016       Past Surgical History:   Procedure Laterality Date    CHOLECYSTECTOMY      FRACTURE SURGERY Bilateral 12/2023    wrist    HERNIA REPAIR      JOINT REPLACEMENT Right     rtk    LAP BAND  08/28/2009    LUMBAR SPINE SURGERY N/A 08/05/2020    TLIF L3-4, L4-5 performed by Cliff Tobar DO at [facility] OR    SKIN BIOPSY  01/2023       Current Facility-Administered Medications   Medication Dose Route Frequency Provider Last Rate Last Admin    albuterol sulfate HFA (PROVENTIL;VENTOLIN;PROAIR) 108 (90 Base) MCG/ACT inhaler 2 puff  2 puff Inhalation Q4H PRN Cliff Tobar DO        ALPRAZolam (XANAX) tablet 2 mg  2 mg Oral Nightly PRN Cliff Tobar DO        calcium elemental (OSCAL) tablet 250 mg  250 mg Oral Daily Cliff Tobar DO   250 mg at 06/18/24 0745    ezetimibe (ZETIA) tablet 10 mg  10 mg Oral Daily Cliff Tobar DO   10 mg at 06/17/24 2106    hydroCHLOROthiazide (HYDRODIURIL) tablet 25 mg  25 mg Oral Daily Cliff Tobar DO   25 mg at 06/18/24 0745    HYDROcodone-acetaminophen (NORCO)  MG per tablet 1 tablet  1 tablet Oral Q4H PRN Cliff Tobar DO        lisinopril (PRINIVIL;ZESTRIL) tablet 10 mg  10 mg Oral Daily Cliff Tobar DO  06/18/2024    K 4.5 06/18/2024    CL 96 (L) 06/18/2024    CO2 26 06/18/2024    BUN 29 (H) 06/18/2024    CREATININE 0.8 06/18/2024    GLUCOSE 111 (H) 06/18/2024    CALCIUM 9.1 06/18/2024    BILITOT 0.2 06/04/2024    ALKPHOS 121 (H) 06/04/2024    AST 14 06/04/2024    ALT 12 06/04/2024    LABGLOM 84 06/18/2024    GFRAA >59 01/27/2021     Lab Results   Component Value Date    INR 0.92 06/04/2024    INR 0.96 01/26/2021    INR 0.88 07/31/2020    PROTIME 12.1 06/04/2024    PROTIME 12.7 01/26/2021    PROTIME 11.8 (L) 07/31/2020         ASSESSMENT:  Mrs. Rodríguez is now s/p Removal of hardware L3-L5 with TLIF of L2-3 using minimally invasive technique using O-arm on 6/17/2024 and is POD #1.       PLAN:  -D/C PVA, IVF, antonio  -LSO brace to be worn when OOB  -PT/OT to get up to chair/ambulate  -Leukocytosis: WBC 18.5, likely due to stress of surgery, will repeat in DAYSI Lewis  Cell: [phone]

## 2024-06-18 NOTE — PROGRESS NOTES
Physical Therapy  Facility/Department: [facility] SURG SERVICES  Physical Therapy Initial Assessment    Name: Ludmila Rodríguez  : 1965  MRN: [id number]  Date of Service: 2024    Discharge Recommendations:  Continue to assess pending progress, 24 hour supervision or assist, Patient would benefit from continued therapy after discharge (pt plans to dc home. Has support of friends and neighbors.)          Patient Diagnosis(es): There were no encounter diagnoses.  Past Medical History:  has a past medical history of Anxiety, Asthma, Back pain, Colon polyps, DDD (degenerative disc disease), cervical, Depression, Essential hypertension, GERD (gastroesophageal reflux disease), Hiatal hernia, Hyperlipidemia, Post-menopausal, Shoulder pain, and Tobacco abuse.  Past Surgical History:  has a past surgical history that includes Lap Band (2009); hernia repair; Lumbar spine surgery (N/A, 2020); Cholecystectomy; joint replacement (Right); skin biopsy (2023); and fracture surgery (Bilateral, 2023).    Assessment   Body Structures, Functions, Activity Limitations Requiring Skilled Therapeutic Intervention: Decreased functional mobility ;Decreased balance  Assessment: Pt would benefit from continued skilled PT in this setting for post-op protocol and to maximize safety to return to home.  Therapy Prognosis: Excellent  Decision Making: Low Complexity  Requires PT Follow-Up: Yes  Activity Tolerance  Activity Tolerance: Patient tolerated evaluation without incident;Patient tolerated treatment well     Plan   Physical Therapy Plan  General Plan: 5-7 times per week  Therapy Duration: 2 Weeks  Current Treatment Recommendations: Balance training, Gait training, Stair training, Positioning, Safety education & training, Patient/Caregiver education & training  Safety Devices  Type of Devices: Call light within reach, Chair alarm in place, Gait belt, Left in chair

## 2024-06-18 NOTE — PROGRESS NOTES
Physical Therapy    Name: Ludmila Rodríguez  MRN: [id number]  Date of service: 6/18/2024 06/18/24 1330   Restrictions/Precautions   Restrictions/Precautions Fall Risk   Required Braces or Orthoses? Yes   Required Braces or Orthoses   Spinal Lumbar Corset   Position Activity Restriction   Spinal Precautions No Bending;No Lifting;No Twisting   General   Diagnosis Spondylolisthesis L2-3   General Comment   Comments pt doing well   Subjective   Subjective agreed to therapy   Subjective   Subjective minimal pain   Pain 5/10 post session   Bed mobility   Supine to Sit Supervision   Sit to Supine Supervision   Transfers   Sit to Stand Stand by assistance   Stand to Sit Stand by assistance   Comment toilet transfer, no assist needed   Ambulation   Surface Level tile   Device No Device   Assistance Stand by assistance   Quality of Gait steady gait with fast ivelisse   Gait Deviations Decreased step height   Distance 250ft   Short Term Goals   Time Frame for Short Term Goals 14 days   Short Term Goal 1 ', SBA   Short Term Goal 2 STAIR TRAINING, CGA   Activity Tolerance   Activity Tolerance Patient tolerated treatment well   Assessment   Assessment Pt is doing well. SBA for most mobility. Able to ambulate longer distances and perform bathroom transfer with no assist needed. Left pt back in bed with all needs in reach. RN notified about pain.   Discharge Recommendations Continue to assess pending progress;Patient would benefit from continued therapy after discharge   Physical Therapy Plan   General Plan 5-7 times per week   Therapy Duration 2 Weeks   Current Treatment Recommendations Balance training;Gait training;Stair training;Positioning;Safety education & training;Patient/Caregiver education & training   PT Plan of Care   Tuesday X   Safety Devices   Type of Devices Left in bed;Bed alarm in place;Call light within reach;Nurse notified   PT Whiteboard Notes   Therapy Whiteboard RE 7/2; radha L2-3   Recommendation

## 2024-06-19 VITALS
TEMPERATURE: 97.7 F | HEIGHT: 65 IN | HEART RATE: 66 BPM | BODY MASS INDEX: 37.99 KG/M2 | SYSTOLIC BLOOD PRESSURE: 137 MMHG | OXYGEN SATURATION: 97 % | DIASTOLIC BLOOD PRESSURE: 74 MMHG | WEIGHT: 228 LBS | RESPIRATION RATE: 20 BRPM

## 2024-06-19 LAB
ERYTHROCYTE [DISTWIDTH] IN BLOOD BY AUTOMATED COUNT: 13.6 % (ref 11.5–14.5)
HCT VFR BLD AUTO: 33.6 % (ref 37–47)
HGB BLD-MCNC: 10.5 G/DL (ref 12–16)
MCH RBC QN AUTO: 31.3 PG (ref 27–31)
MCHC RBC AUTO-ENTMCNC: 31.3 G/DL (ref 33–37)
MCV RBC AUTO: 100.3 FL (ref 81–99)
PLATELET # BLD AUTO: 312 K/UL (ref 130–400)
PMV BLD AUTO: 9.9 FL (ref 9.4–12.3)
RBC # BLD AUTO: 3.35 M/UL (ref 4.2–5.4)
WBC # BLD AUTO: 11.9 K/UL (ref 4.8–10.8)

## 2024-06-19 PROCEDURE — 6370000000 HC RX 637 (ALT 250 FOR IP): Performed by: NEUROLOGICAL SURGERY

## 2024-06-19 PROCEDURE — 6360000002 HC RX W HCPCS: Performed by: NURSE PRACTITIONER

## 2024-06-19 PROCEDURE — G0378 HOSPITAL OBSERVATION PER HR: HCPCS

## 2024-06-19 PROCEDURE — 99024 POSTOP FOLLOW-UP VISIT: CPT | Performed by: NURSE PRACTITIONER

## 2024-06-19 PROCEDURE — 36415 COLL VENOUS BLD VENIPUNCTURE: CPT

## 2024-06-19 PROCEDURE — 96375 TX/PRO/DX INJ NEW DRUG ADDON: CPT

## 2024-06-19 PROCEDURE — 99238 HOSP IP/OBS DSCHRG MGMT 30/<: CPT | Performed by: NURSE PRACTITIONER

## 2024-06-19 PROCEDURE — 94760 N-INVAS EAR/PLS OXIMETRY 1: CPT

## 2024-06-19 PROCEDURE — 96376 TX/PRO/DX INJ SAME DRUG ADON: CPT

## 2024-06-19 PROCEDURE — 2580000003 HC RX 258: Performed by: NEUROLOGICAL SURGERY

## 2024-06-19 PROCEDURE — 85027 COMPLETE CBC AUTOMATED: CPT

## 2024-06-19 RX ORDER — HYDROCODONE BITARTRATE AND ACETAMINOPHEN 10; 325 MG/1; MG/1
1 TABLET ORAL EVERY 6 HOURS PRN
Qty: 120 TABLET | Refills: 0 | Status: SHIPPED | OUTPATIENT
Start: 2024-06-19 | End: 2024-07-19

## 2024-06-19 RX ORDER — DOCUSATE SODIUM 100 MG/1
100 CAPSULE, LIQUID FILLED ORAL 2 TIMES DAILY PRN
Qty: 60 CAPSULE | Refills: 0 | Status: SHIPPED | OUTPATIENT
Start: 2024-06-19

## 2024-06-19 RX ADMIN — HYDROCHLOROTHIAZIDE 25 MG: 25 TABLET ORAL at 07:53

## 2024-06-19 RX ADMIN — CALCIUM 250 MG: 500 TABLET ORAL at 07:53

## 2024-06-19 RX ADMIN — HYDROCODONE BITARTRATE AND ACETAMINOPHEN 1 TABLET: 10; 325 TABLET ORAL at 09:16

## 2024-06-19 RX ADMIN — LISINOPRIL 10 MG: 10 TABLET ORAL at 07:52

## 2024-06-19 RX ADMIN — SODIUM CHLORIDE, PRESERVATIVE FREE 10 ML: 5 INJECTION INTRAVENOUS at 07:53

## 2024-06-19 RX ADMIN — PANTOPRAZOLE SODIUM 40 MG: 40 TABLET, DELAYED RELEASE ORAL at 06:14

## 2024-06-19 RX ADMIN — HYDROMORPHONE HYDROCHLORIDE 1 MG: 1 INJECTION, SOLUTION INTRAMUSCULAR; INTRAVENOUS; SUBCUTANEOUS at 02:20

## 2024-06-19 RX ADMIN — HYDROCODONE BITARTRATE AND ACETAMINOPHEN 1 TABLET: 10; 325 TABLET ORAL at 04:19

## 2024-06-19 RX ADMIN — METHOCARBAMOL TABLETS 750 MG: 750 TABLET, COATED ORAL at 04:19

## 2024-06-19 RX ADMIN — HYDROMORPHONE HYDROCHLORIDE 1 MG: 1 INJECTION, SOLUTION INTRAMUSCULAR; INTRAVENOUS; SUBCUTANEOUS at 06:16

## 2024-06-19 RX ADMIN — BISACODYL 5 MG: 5 TABLET, COATED ORAL at 07:53

## 2024-06-19 ASSESSMENT — PAIN SCALES - GENERAL
PAINLEVEL_OUTOF10: 8
PAINLEVEL_OUTOF10: 7

## 2024-06-19 ASSESSMENT — PAIN DESCRIPTION - DESCRIPTORS
DESCRIPTORS: ACHING

## 2024-06-19 ASSESSMENT — PAIN DESCRIPTION - LOCATION
LOCATION: BACK

## 2024-06-19 ASSESSMENT — PAIN DESCRIPTION - ORIENTATION
ORIENTATION: MID

## 2024-06-19 NOTE — DISCHARGE INSTR - DIET
Good nutrition is important when healing from an illness, injury, or surgery.  Follow any nutrition recommendations given to you during your hospital stay.   If you were given an oral nutrition supplement while in the hospital, continue to take this supplement at home.  You can take it with meals, in-between meals, and/or before bedtime. These supplements can be purchased at most local grocery stores, pharmacies, and chain [employer]-stores.   If you have any questions about your diet or nutrition, call the hospital and ask for the dietitian.    Regular diet

## 2024-06-19 NOTE — DISCHARGE SUMMARY
[facility]  Discharge Summary     Patient:   Ludmila Rodríguez  MR#:    [id number]      YOB: 1965  Date of Evaluation:  6/19/2024  Time of Note:                          7:45 AM  Primary/Referring Physician:  Chris Saldaña MD   Note Author:    DAYSI Gipson        Admission Date: 6/17/2024    Discharge Date: 6/19/2024    Final Diagnoses: Neurogenic claudication due to lumbar spinal stenosis [M48.062]  Spondylolisthesis of lumbar region [M43.16]  Spondylolisthesis at L2-L3 level [M43.16]    Procedures: Removal of hardware L3-L5 with TLIF of L2-3 using minimally invasive technique using O-arm     Consultations: PT, OT    Reason for Admission: Surgery    Hospital Course:  Ludmila Rodríguez was admitted with the above named diagnosis, surgery was performed and tolerated well.  At the time of discharge, the patient was afebrile, vitals stable, pain was controlled with oral medication, they were tolerating a by mouth diet, and voiding appropriately.     Patient Condition: Stable    Disposition: Home    Wound Instructions: Able to shower.  Please keep wound dry.  DO NOT SOAK WOUND    Diet: regular diet    Resume home meds:      Medication List        START taking these medications      docusate sodium 100 MG capsule  Commonly known as: COLACE  Take 1 capsule by mouth 2 times daily as needed for Constipation            CHANGE how you take these medications      HYDROcodone-acetaminophen  MG per tablet  Commonly known as: NORCO  Take 1 tablet by mouth every 6 hours as needed for Pain for up to 30 days. Max Daily Amount: 4 tablets  What changed: reasons to take this     methocarbamol 750 MG tablet  Commonly known as: Robaxin-750  Take 1 tablet by mouth 4 times daily as needed (muscle spasm)  What changed: when to take this            CONTINUE taking these medications      acetaminophen 325 MG tablet  Commonly known as: TYLENOL     albuterol sulfate  (90 Base) MCG/ACT inhaler  Commonly  known as: PROVENTIL;VENTOLIN;PROAIR     ALPRAZolam 1 MG tablet  Commonly known as: XANAX     aspirin 81 MG chewable tablet     BENADRYL PO     Calcium 250 MG Caps     clotrimazole 1 % cream  Commonly known as: LOTRIMIN     famotidine 20 MG tablet  Commonly known as: PEPCID     hydroCHLOROthiazide 25 MG tablet  Commonly known as: HYDRODIURIL     lisinopril 10 MG tablet  Commonly known as: PRINIVIL;ZESTRIL     mupirocin 2 % ointment  Commonly known as: BACTROBAN     naloxone 4 MG/0.1ML Liqd nasal spray     nystatin 750689 UNIT/GM cream  Commonly known as: MYCOSTATIN     omeprazole 40 MG delayed release capsule  Commonly known as: PRILOSEC     sertraline 100 MG tablet  Commonly known as: ZOLOFT     VITAMIN D (CHOLECALCIFEROL) PO     Zetia 10 MG tablet  Generic drug: ezetimibe            STOP taking these medications      celecoxib 100 MG capsule  Commonly known as: CeleBREX               Where to Get Your Medications        These medications were sent to [id number] [facility] -  [phone] - F [phone]  [address] [place] [address] [address] [place] [address]      Phone: [phone]   docusate sodium 100 MG capsule  HYDROcodone-acetaminophen  MG per tablet       Stated she had enough muscle relaxers at home.      Return to Clinic: 6/27/2024 at 11:45am with Dr. Cliff Tobar    A total of 15 minutes was spent on this discharge.         This dictation was generated by voice recognition computer software.  Although all attempts are made to edit the dictation for accuracy, there may be errors in the transcription that are not intended.        DAYSI Gipson

## 2024-06-19 NOTE — DISCHARGE INSTRUCTIONS
-Your incision is to stay dry for one week. You should not remove or change the dressing. If the dressing becomes wet or saturated call the office to determine what should be done.   -You may shower, but stand with your incision site/dressing away from the shower head.   -It is normal to have some swelling at the incision site.   -Do not lift anything heavier than a coffee cup and newspaper! No exceptions.   -No sweeping, mopping, vacuuming, and removing laundry from the washer and dryer. You may fold clothes but do not lift the basket full of clothes!  -Do not drive until cleared by Neurosurgery. This will be at least 1 week.   -Walk every day. Walking around in your house is fine to begin. Increase distance slowly. Walk short distances several times a day instead of long distances once a day.   -You have symptoms of infection such as: Increased pain, swelling, warmth, or redness, red streaks leading from the incision, purulent draining from the incision, if fever please call the office immediately.

## 2024-06-21 NOTE — PROGRESS NOTES
CLINICAL PHARMACY NOTE: MEDS TO BEDS    Total # of Prescriptions Filled: 1   The following medications were delivered to the patient:  Discharge Medication List as of 6/19/2024  8:57 AM        START taking these medications    Details   docusate sodium (COLACE) 100 MG capsule Take 1 capsule by mouth 2 times daily as needed for Constipation, Disp-60 capsule, R-0Normal               Additional Documentation:    Patient picked up tricia at pharmacy. Paid with cash.

## 2024-06-27 ENCOUNTER — OFFICE VISIT (OUTPATIENT)
Dept: NEUROSURGERY | Age: 59
End: 2024-06-27

## 2024-06-27 VITALS
WEIGHT: 228 LBS | DIASTOLIC BLOOD PRESSURE: 83 MMHG | SYSTOLIC BLOOD PRESSURE: 144 MMHG | HEART RATE: 100 BPM | BODY MASS INDEX: 37.99 KG/M2 | HEIGHT: 65 IN | RESPIRATION RATE: 18 BRPM

## 2024-06-27 DIAGNOSIS — Z98.1 S/P LUMBAR FUSION: Primary | ICD-10-CM

## 2024-06-27 PROCEDURE — 99024 POSTOP FOLLOW-UP VISIT: CPT | Performed by: NEUROLOGICAL SURGERY

## 2024-06-27 ASSESSMENT — ENCOUNTER SYMPTOMS
EYES NEGATIVE: 1
BACK PAIN: 1
GASTROINTESTINAL NEGATIVE: 1
RESPIRATORY NEGATIVE: 1

## 2024-06-27 NOTE — PROGRESS NOTES
Review of Systems   Constitutional: Negative.    HENT: Negative.     Eyes: Negative.    Respiratory: Negative.     Cardiovascular: Negative.    Gastrointestinal: Negative.    Genitourinary: Negative.    Musculoskeletal:  Positive for back pain and myalgias.   Skin: Negative.    Neurological: Negative.    Endo/Heme/Allergies: Negative.    Psychiatric/Behavioral: Negative.

## 2024-06-27 NOTE — PROGRESS NOTES
[facility]  Post-Op Office Visit          No chief complaint on file.      HISTORY OF PRESENT ILLNESS:      Ludmila Rodríguze is a 58 y.o. female who underwent a right TLIF L2-3 for a spondylolisthesis at L2-3 with radiculopathy on 6/17/2024 and now she is 1 week out from her surgery.    Prior to surgery she complained of severe low back pain.       Today she states that her back pain is improving.  She is ambulating well and doing some light activities.  Her previous back pain is better.         Past Medical History:   Diagnosis Date    Anxiety     Asthma     Back pain     Colon polyps     DDD (degenerative disc disease), cervical     Depression     Essential hypertension 10/19/2016    sees [facility] cardiology yearly    GERD (gastroesophageal reflux disease)     Hiatal hernia     Hyperlipidemia     Post-menopausal     Shoulder pain     Tobacco abuse 10/19/2016       Past Surgical History:   Procedure Laterality Date    CHOLECYSTECTOMY      FRACTURE SURGERY Bilateral 12/2023    wrist    HERNIA REPAIR      JOINT REPLACEMENT Right     rtk    LAP BAND  08/28/2009    LUMBAR SPINE SURGERY N/A 08/05/2020    TLIF L3-4, L4-5 performed by Cliff Tobar DO at [facility] OR    LUMBAR SPINE SURGERY Left 6/17/2024    Removal of hardware L3-L5 with TLIF of L2-3 using minimally invasive technique using O-arm performed by Cliff Tobar DO at [facility] OR    SKIN BIOPSY  01/2023        Medications    Current Outpatient Medications:     HYDROcodone-acetaminophen (NORCO)  MG per tablet, Take 1 tablet by mouth every 6 hours as needed for Pain for up to 30 days. Max Daily Amount: 4 tablets, Disp: 120 tablet, Rfl: 0    docusate sodium (COLACE) 100 MG capsule, Take 1 capsule by mouth 2 times daily as needed for Constipation, Disp: 60 capsule, Rfl: 0    Calcium 250 MG CAPS, Take 1 capsule by mouth daily, Disp: , Rfl:     methocarbamol (ROBAXIN-750) 750 MG tablet, Take 1 tablet by mouth 4 times daily as needed (muscle spasm), Disp:

## 2024-07-02 ENCOUNTER — TELEPHONE (OUTPATIENT)
Dept: NEUROSURGERY | Age: 59
End: 2024-07-02

## 2024-07-02 NOTE — TELEPHONE ENCOUNTER
Patient called and left voicemail stating she was having some post-op issues. Called patient back and she stated, \"I am having a sharp, cutting pain on the sides of my surgical site and I have right leg pain down to the knee on the front of my leg.\" Patient denied numbness/tingling. She states she is taking 2-3 of her Norco daily and it isn't helping her pain much. I did let patient know it was prescribed for up to 4x daily so if she has to take that many that is OK. I let her know I would send a message to the provider and see if there is anything different suggested she should do. Patient thanked me and voiced understanding.

## 2024-07-02 NOTE — TELEPHONE ENCOUNTER
Called patient and let her know. She states her nurse friend looked at her wound and said it looked good. I let her know if she has any further concerns to reach out to us again. She thanked me and voiced understanding.    spoke to mother

## 2024-07-02 NOTE — TELEPHONE ENCOUNTER
I am not sure her activity level at this time but she needs to continue to take it easy.  She can take a few shorts walks throughout the day, however, no lifting, pushing, or pulling more than 5 lbs.  It is not uncommon to have intermittent twinges of pain in the back and legs following surgery.  As long as her wound looks good without any drainage or significant swelling and she is not having any significant weakness she should be ok.

## 2024-07-23 ENCOUNTER — OFFICE VISIT (OUTPATIENT)
Dept: NEUROSURGERY | Age: 59
End: 2024-07-23

## 2024-07-23 VITALS
HEIGHT: 65 IN | DIASTOLIC BLOOD PRESSURE: 85 MMHG | HEART RATE: 86 BPM | RESPIRATION RATE: 18 BRPM | WEIGHT: 228 LBS | SYSTOLIC BLOOD PRESSURE: 125 MMHG | BODY MASS INDEX: 37.99 KG/M2

## 2024-07-23 DIAGNOSIS — Z98.1 S/P LUMBAR FUSION: Primary | ICD-10-CM

## 2024-07-23 PROCEDURE — 99024 POSTOP FOLLOW-UP VISIT: CPT | Performed by: NURSE PRACTITIONER

## 2024-07-23 ASSESSMENT — ENCOUNTER SYMPTOMS
RESPIRATORY NEGATIVE: 1
GASTROINTESTINAL NEGATIVE: 1
EYES NEGATIVE: 1

## 2024-07-23 NOTE — PROGRESS NOTES
Review of Systems   Constitutional: Negative.    HENT: Negative.     Eyes: Negative.    Respiratory: Negative.     Cardiovascular: Negative.    Gastrointestinal: Negative.    Genitourinary: Negative.    Skin: Negative.    Endo/Heme/Allergies: Negative.    Psychiatric/Behavioral: Negative.

## 2024-07-23 NOTE — PROGRESS NOTES
[facility]  Post-Op Office Visit          Chief Complaint   Patient presents with    Follow-up     Patient presents for 3 week follow up. Patient states overall she is doing pretty well since surgery.     Results     XR lumbar spine        HISTORY OF PRESENT ILLNESS:      Ludmila Rodríguez is a 58 y.o. female who underwent a removal of hardware L3-L5 with TLIF of L2-3 using minimally invasive technique using O-arm for a spondylolisthesis at L2-3 with radiculopathy on 6/17/2024 and now she is 1 month out from her surgery.      Prior to surgery she complained of severe low back pain, bilateral hip and buttock pains.         Today she states she is doing okay.  She states her back pain is improving and is not as intense, however, she does continue to have pain.        Past Medical History:   Diagnosis Date    Anxiety     Asthma     Back pain     Colon polyps     DDD (degenerative disc disease), cervical     Depression     Essential hypertension 10/19/2016    sees [facility] cardiology yearly    GERD (gastroesophageal reflux disease)     Hiatal hernia     Hyperlipidemia     Post-menopausal     Shoulder pain     Tobacco abuse 10/19/2016       Past Surgical History:   Procedure Laterality Date    CHOLECYSTECTOMY      FRACTURE SURGERY Bilateral 12/2023    wrist    HERNIA REPAIR      JOINT REPLACEMENT Right     rtk    LAP BAND  08/28/2009    LUMBAR SPINE SURGERY N/A 08/05/2020    TLIF L3-4, L4-5 performed by Cliff Tobar DO at [facility] OR    LUMBAR SPINE SURGERY Left 6/17/2024    Removal of hardware L3-L5 with TLIF of L2-3 using minimally invasive technique using O-arm performed by Cliff Tobar DO at [facility] OR    SKIN BIOPSY  01/2023        Medications    Current Outpatient Medications:     Calcium 250 MG CAPS, Take 1 capsule by mouth daily, Disp: , Rfl:     methocarbamol (ROBAXIN-750) 750 MG tablet, Take 1 tablet by mouth 4 times daily as needed (muscle spasm), Disp: 120 tablet, Rfl: 2    VITAMIN D, CHOLECALCIFEROL,

## 2024-09-03 ENCOUNTER — TELEPHONE (OUTPATIENT)
Dept: NEUROSURGERY | Age: 59
End: 2024-09-03

## 2024-09-03 NOTE — TELEPHONE ENCOUNTER
Patient called and stated that she had surgery in June and she is still having \"nerve pain\" and wanted to know if we would reach out to her PCP and get the DNA test to call her in something for her nerve pain.  I advised patient to call her PCP and have them send us a copy DNA study and after we receive it we will have DAYSI Gipson review the DNA test.  Patient VU.  Our fax number was provided to patient [phone].

## 2024-09-04 NOTE — TELEPHONE ENCOUNTER
Per DAYSI Gipson she does not want to make any changes at this time.  I called patient and she was very nice and states that \"she would continue her current RX and ice therapy.\"

## 2024-09-10 NOTE — PROGRESS NOTES
Patient has been weaned off her pain medication for post surgical pain. However, patient continues to have issues of chronic shoulder and chronic hip pain. There was a miss communication with patient that provider would resume prescribing her chronic pain medicine once neurosurgery had released her as far as her post op pain. Patient states low back pain has significantly improved and she does not require as much medication as prior to surgery. Script for November and December sent due to provider being out of office for surgery and upcoming holidays.
None

## 2024-10-01 ENCOUNTER — OFFICE VISIT (OUTPATIENT)
Dept: NEUROSURGERY | Age: 59
End: 2024-10-01
Payer: COMMERCIAL

## 2024-10-01 ENCOUNTER — HOSPITAL ENCOUNTER (OUTPATIENT)
Dept: GENERAL RADIOLOGY | Age: 59
Discharge: HOME | End: 2024-10-01
Payer: COMMERCIAL

## 2024-10-01 VITALS
WEIGHT: 227.96 LBS | DIASTOLIC BLOOD PRESSURE: 76 MMHG | HEIGHT: 65 IN | HEART RATE: 75 BPM | BODY MASS INDEX: 37.98 KG/M2 | SYSTOLIC BLOOD PRESSURE: 142 MMHG

## 2024-10-01 DIAGNOSIS — Z98.1 S/P LUMBAR FUSION: ICD-10-CM

## 2024-10-01 DIAGNOSIS — Z98.1 S/P LUMBAR FUSION: Primary | ICD-10-CM

## 2024-10-01 PROCEDURE — 3077F SYST BP >= 140 MM HG: CPT | Performed by: NEUROLOGICAL SURGERY

## 2024-10-01 PROCEDURE — 72100 X-RAY EXAM L-S SPINE 2/3 VWS: CPT

## 2024-10-01 PROCEDURE — 99213 OFFICE O/P EST LOW 20 MIN: CPT | Performed by: NEUROLOGICAL SURGERY

## 2024-10-01 PROCEDURE — 3078F DIAST BP <80 MM HG: CPT | Performed by: NEUROLOGICAL SURGERY

## 2024-10-01 ASSESSMENT — ENCOUNTER SYMPTOMS
RESPIRATORY NEGATIVE: 1
GASTROINTESTINAL NEGATIVE: 1
EYES NEGATIVE: 1

## 2024-10-01 NOTE — PROGRESS NOTES
Review of Systems   Constitutional: Negative.    HENT: Negative.     Eyes: Negative.    Respiratory: Negative.     Cardiovascular: Negative.    Gastrointestinal: Negative.    Genitourinary: Negative.    Skin: Negative.    Endo/Heme/Allergies: Negative.    Psychiatric/Behavioral: Negative.        
  1. S/P lumbar fusion  Z98.1 XR LUMBAR SPINE (2-3 VIEWS)             Cliff Tobar DO

## 2024-10-22 ENCOUNTER — TELEPHONE (OUTPATIENT)
Dept: CARDIOLOGY CLINIC | Age: 59
End: 2024-10-22

## 2024-10-22 NOTE — TELEPHONE ENCOUNTER
Date: 11/21/24    Cardiologist: only NP    Procedure: Left Total Knee    Surgeon: Kendra    Last Office Visit: 4/23/24  Reason for office visit and medical concerns addressed at this office visit: htn, hyperlipidemia, palpitations    Testing Performed and Date of Service:  DSE 12/22/23  The resting echocardiogram was normal.   Dobutamine stress echocardiogram without clinical, electrocardiographic,   or echocardiographic evidence of myocardial ischemia.   There is low risk of myocardial ischemia.    RCRI = 0.4   METs 4    Current Medications: zoloft, omeprazole, lisinopril, hctz, pepcid, zetia, aspirin, xanax    Is the patient currently taking an anticoagulant? If so, what is the diagnosis the patient has been given to warrant the need for the anticoagulant? none    Additional Notes: requesting cardiac clearance

## 2024-10-28 ENCOUNTER — OFFICE VISIT (OUTPATIENT)
Dept: CARDIOLOGY CLINIC | Age: 59
End: 2024-10-28
Payer: COMMERCIAL

## 2024-10-28 VITALS
HEART RATE: 98 BPM | RESPIRATION RATE: 20 BRPM | SYSTOLIC BLOOD PRESSURE: 130 MMHG | BODY MASS INDEX: 39.65 KG/M2 | WEIGHT: 238 LBS | OXYGEN SATURATION: 96 % | DIASTOLIC BLOOD PRESSURE: 82 MMHG | HEIGHT: 65 IN

## 2024-10-28 DIAGNOSIS — R00.2 PALPITATIONS: ICD-10-CM

## 2024-10-28 DIAGNOSIS — I10 ESSENTIAL HYPERTENSION: Primary | ICD-10-CM

## 2024-10-28 DIAGNOSIS — R00.0 TACHYCARDIA: ICD-10-CM

## 2024-10-28 PROCEDURE — 99214 OFFICE O/P EST MOD 30 MIN: CPT | Performed by: NURSE PRACTITIONER

## 2024-10-28 PROCEDURE — 3075F SYST BP GE 130 - 139MM HG: CPT | Performed by: NURSE PRACTITIONER

## 2024-10-28 PROCEDURE — 3079F DIAST BP 80-89 MM HG: CPT | Performed by: NURSE PRACTITIONER

## 2024-10-28 RX ORDER — HYDROCODONE BITARTRATE AND ACETAMINOPHEN 10; 325 MG/1; MG/1
1 TABLET ORAL EVERY 6 HOURS PRN
COMMUNITY
Start: 2024-10-07

## 2024-10-28 ASSESSMENT — ENCOUNTER SYMPTOMS
COUGH: 0
CHEST TIGHTNESS: 0
WHEEZING: 0
SORE THROAT: 0
SHORTNESS OF BREATH: 0

## 2024-10-28 NOTE — PROGRESS NOTES
[facility]   Established Patient Office Visit  [address]  [address]  [facility] [address] [phone]        OFFICE VISIT:  10/28/2024    Ludmila Rodríguez - : 1965    Reason For Visit:  Ludmila is a 58 y.o. female who is here for Follow-up    1. Essential hypertension    2. Palpitations    3. Tachycardia          Patient with negative family history of early coronary artery disease. She is a current smoker. She does have a history of hypertension and hyperlipidemia. She denies diabetes, COPD, or chronic kidney disease. She did have a stress test about 5 years ago here it was a dobutamine stress echocardiogram that was negative.            DATA:   Summary   Mitral annular calcification is present.   Mildly thickened mitral valve with normal leaflet mobility. No evidence of   mitral valve stenosis or mitral regurgitation.   Aortic valve appears to be tricuspid.   Structurally normal aortic valve.   No significant aortic regurgitation or stenosis is noted.   Tricuspid valve is structurally normal.   Trivial tricuspid regurgitation with estimated RVSP of 36 mm Hg.   Normal left ventricular size with preserved LV function and an estimated   ejection fraction of approximately 65-70%.   Mild concentric left ventricular hypertrophy.   No regional wall motion abnormalities.   No evidence of left ventricular mass or thrombus noted.      Signature      ----------------------------------------------------------------   Electronically signed by Hussein Solis MD(Interpreting   physician) on 2023 08:54 AM   ---------------------------------------------------------------     Summary   The resting echocardiogram was normal.   Dobutamine stress echocardiogram without clinical, electrocardiographic,   or echocardiographic evidence of myocardial ischemia.   There is low risk of myocardial ischemia.      Signature      ----------------------------------------------------------------   Electronically signed by

## 2024-12-10 ENCOUNTER — TELEPHONE (OUTPATIENT)
Dept: NEUROSURGERY | Age: 59
End: 2024-12-10

## 2024-12-10 DIAGNOSIS — M54.42 CHRONIC MIDLINE LOW BACK PAIN WITH BILATERAL SCIATICA: ICD-10-CM

## 2024-12-10 DIAGNOSIS — G89.29 CHRONIC MIDLINE LOW BACK PAIN WITH BILATERAL SCIATICA: ICD-10-CM

## 2024-12-10 DIAGNOSIS — M51.360 DEGENERATION OF INTERVERTEBRAL DISC OF LUMBAR REGION WITH DISCOGENIC BACK PAIN: ICD-10-CM

## 2024-12-10 DIAGNOSIS — Z98.1 S/P LUMBAR FUSION: Primary | ICD-10-CM

## 2024-12-10 DIAGNOSIS — M54.41 CHRONIC MIDLINE LOW BACK PAIN WITH BILATERAL SCIATICA: ICD-10-CM

## 2025-01-08 ENCOUNTER — OFFICE VISIT (OUTPATIENT)
Dept: NEUROSURGERY | Age: 60
End: 2025-01-08
Payer: COMMERCIAL

## 2025-01-08 ENCOUNTER — TELEPHONE (OUTPATIENT)
Dept: NEUROSURGERY | Age: 60
End: 2025-01-08

## 2025-01-08 ENCOUNTER — HOSPITAL ENCOUNTER (OUTPATIENT)
Dept: GENERAL RADIOLOGY | Age: 60
Discharge: HOME | End: 2025-01-08
Payer: COMMERCIAL

## 2025-01-08 VITALS
BODY MASS INDEX: 39.65 KG/M2 | WEIGHT: 238 LBS | DIASTOLIC BLOOD PRESSURE: 80 MMHG | SYSTOLIC BLOOD PRESSURE: 132 MMHG | HEIGHT: 65 IN | HEART RATE: 85 BPM

## 2025-01-08 DIAGNOSIS — Z98.1 S/P LUMBAR FUSION: Primary | ICD-10-CM

## 2025-01-08 DIAGNOSIS — Z98.1 S/P LUMBAR FUSION: ICD-10-CM

## 2025-01-08 DIAGNOSIS — M79.18 BILATERAL BUTTOCK PAIN: ICD-10-CM

## 2025-01-08 PROCEDURE — 3079F DIAST BP 80-89 MM HG: CPT | Performed by: NURSE PRACTITIONER

## 2025-01-08 PROCEDURE — 99214 OFFICE O/P EST MOD 30 MIN: CPT | Performed by: NURSE PRACTITIONER

## 2025-01-08 PROCEDURE — 3075F SYST BP GE 130 - 139MM HG: CPT | Performed by: NURSE PRACTITIONER

## 2025-01-08 PROCEDURE — 72100 X-RAY EXAM L-S SPINE 2/3 VWS: CPT

## 2025-01-08 ASSESSMENT — ENCOUNTER SYMPTOMS
EYES NEGATIVE: 1
BACK PAIN: 1
RESPIRATORY NEGATIVE: 1
GASTROINTESTINAL NEGATIVE: 1

## 2025-01-08 NOTE — TELEPHONE ENCOUNTER
Please try to obtain DXA imaging she states was done at [facility].  Not sure if it was 2023 or 2024.    She would like to start on Prolia.  Was told she has osteopenia.    Thanks!

## 2025-01-08 NOTE — PROGRESS NOTES
[facility]  Post-Op Office Visit      Chief Complaint   Patient presents with    Follow-up     Post TLIF.  Patient states that she is having pain in the sacrum lately. She has been using ice for pain relief.        HISTORY OF PRESENT ILLNESS:      Ludmila Rodríguez is a 59 y.o. female with anxiety who underwent a removal of hardware L3-L5 with TLIF of L2-3 using minimally invasive technique using O-arm for a spondylolisthesis at L2-3 with radiculopathy on 6/17/2024 and now she is 6 months out from her surgery.    Prior to surgery she complained of severe low back pain, bilateral hip and buttock pains.       Today she states she is doing okay, she is having some very low back and buttock pain. She states she did see Estevan Cao CRNA and he did not want to perform any injections until she is a year out from surgery.   She is starting PT tomorrow for the knee. She did have the LEFT knee replacement per Dr. Gardner about 6 weeks ago, doing well from that.  She has poor balance.  Forgot to get x-ray prior to appointment.      She inquires about osteoporosis medications.  Has taken oral bisphosphates in the past.      Denies ever having a hysterectomy.      She is on a daily 81mg ASA.       Past Medical History:   Diagnosis Date    Anxiety     Asthma     Back pain     Colon polyps     DDD (degenerative disc disease), cervical     Depression     Essential hypertension 10/19/2016    sees [facility] cardiology yearly    GERD (gastroesophageal reflux disease)     Hiatal hernia     Hyperlipidemia     Post-menopausal     Shoulder pain     Tobacco abuse 10/19/2016       Past Surgical History:   Procedure Laterality Date    CHOLECYSTECTOMY      FRACTURE SURGERY Bilateral 12/2023    wrist    HERNIA REPAIR      JOINT REPLACEMENT Right     rtk    LAP BAND  08/28/2009    LUMBAR SPINE SURGERY N/A 08/05/2020    TLIF L3-4, L4-5 performed by Cliff Tobar DO at [facility] OR    LUMBAR SPINE SURGERY Left 6/17/2024    Removal of hardware

## 2025-01-08 NOTE — PROGRESS NOTES
Review of Systems   Constitutional: Negative.    HENT: Negative.     Eyes: Negative.    Respiratory: Negative.     Cardiovascular: Negative.    Gastrointestinal: Negative.    Genitourinary: Negative.    Musculoskeletal:  Positive for back pain.   Skin: Negative.    Neurological: Negative.    Endo/Heme/Allergies: Negative.    Psychiatric/Behavioral: Negative.

## 2025-01-09 NOTE — TELEPHONE ENCOUNTER
Received a return fax from [facility], they have no DEXA scans on file for this patient.

## 2025-01-20 DIAGNOSIS — M85.80 OSTEOPENIA, UNSPECIFIED LOCATION: ICD-10-CM

## 2025-01-20 DIAGNOSIS — Z78.0 POST-MENOPAUSAL: Primary | ICD-10-CM

## 2025-02-10 ENCOUNTER — TELEPHONE (OUTPATIENT)
Dept: NEUROSURGERY | Age: 60
End: 2025-02-10

## 2025-02-10 NOTE — TELEPHONE ENCOUNTER
Patient called requesting a follow up appointment. I explained she would need to complete the Dexa scan Rosa ordered first before we could proceed with Prolia. Patient voiced understanding and I provided her the number to central scheduling to schedule the test and instructed her to call the office back once it's completed to schedule a follow up.

## 2025-04-21 ENCOUNTER — HOSPITAL ENCOUNTER (OUTPATIENT)
Dept: WOMENS IMAGING | Age: 60
Discharge: HOME | End: 2025-04-21
Payer: COMMERCIAL

## 2025-04-21 ENCOUNTER — HOSPITAL ENCOUNTER (OUTPATIENT)
Dept: GENERAL RADIOLOGY | Age: 60
Discharge: HOME | End: 2025-04-21
Payer: COMMERCIAL

## 2025-04-21 VITALS — BODY MASS INDEX: 37.61 KG/M2 | WEIGHT: 226 LBS

## 2025-04-21 DIAGNOSIS — Z12.31 ENCOUNTER FOR SCREENING MAMMOGRAM FOR MALIGNANT NEOPLASM OF BREAST: ICD-10-CM

## 2025-04-21 DIAGNOSIS — Z78.0 POST-MENOPAUSAL: ICD-10-CM

## 2025-04-21 DIAGNOSIS — M79.18 BILATERAL BUTTOCK PAIN: ICD-10-CM

## 2025-04-21 DIAGNOSIS — Z98.1 S/P LUMBAR FUSION: Primary | ICD-10-CM

## 2025-04-21 DIAGNOSIS — Z98.1 S/P LUMBAR FUSION: ICD-10-CM

## 2025-04-21 DIAGNOSIS — M85.80 OSTEOPENIA, UNSPECIFIED LOCATION: ICD-10-CM

## 2025-04-21 PROCEDURE — 77080 DXA BONE DENSITY AXIAL: CPT

## 2025-04-21 PROCEDURE — 77067 SCR MAMMO BI INCL CAD: CPT

## 2025-04-21 PROCEDURE — 72100 X-RAY EXAM L-S SPINE 2/3 VWS: CPT

## 2025-04-22 ENCOUNTER — OFFICE VISIT (OUTPATIENT)
Dept: NEUROSURGERY | Age: 60
End: 2025-04-22
Payer: COMMERCIAL

## 2025-04-22 VITALS
BODY MASS INDEX: 44.37 KG/M2 | DIASTOLIC BLOOD PRESSURE: 81 MMHG | HEIGHT: 60 IN | SYSTOLIC BLOOD PRESSURE: 141 MMHG | WEIGHT: 226 LBS | HEART RATE: 90 BPM

## 2025-04-22 DIAGNOSIS — M41.80 LEVOSCOLIOSIS: ICD-10-CM

## 2025-04-22 DIAGNOSIS — M51.360 DEGENERATION OF INTERVERTEBRAL DISC OF LUMBAR REGION WITH DISCOGENIC BACK PAIN: ICD-10-CM

## 2025-04-22 DIAGNOSIS — M79.18 BILATERAL BUTTOCK PAIN: ICD-10-CM

## 2025-04-22 DIAGNOSIS — Z98.1 S/P LUMBAR FUSION: Primary | ICD-10-CM

## 2025-04-22 PROCEDURE — 3077F SYST BP >= 140 MM HG: CPT | Performed by: NURSE PRACTITIONER

## 2025-04-22 PROCEDURE — 3079F DIAST BP 80-89 MM HG: CPT | Performed by: NURSE PRACTITIONER

## 2025-04-22 PROCEDURE — 99214 OFFICE O/P EST MOD 30 MIN: CPT | Performed by: NURSE PRACTITIONER

## 2025-04-22 RX ORDER — METHOCARBAMOL 750 MG/1
750 TABLET, FILM COATED ORAL 3 TIMES DAILY PRN
COMMUNITY
Start: 2025-04-17

## 2025-04-22 RX ORDER — BUPROPION HYDROCHLORIDE 150 MG/1
150 TABLET, EXTENDED RELEASE ORAL DAILY
COMMUNITY

## 2025-04-22 ASSESSMENT — ENCOUNTER SYMPTOMS
GASTROINTESTINAL NEGATIVE: 1
BACK PAIN: 1
RESPIRATORY NEGATIVE: 1
EYES NEGATIVE: 1

## 2025-04-22 NOTE — PROGRESS NOTES
Review of Systems   Constitutional: Negative.    HENT: Negative.     Eyes: Negative.    Respiratory: Negative.     Cardiovascular: Negative.    Gastrointestinal: Negative.    Genitourinary: Negative.    Musculoskeletal:  Positive for back pain and myalgias.   Skin: Negative.    Neurological: Negative.    Endo/Heme/Allergies: Negative.    Psychiatric/Behavioral: Negative.         
well-developed andwell-nourished.   Eyes - conjunctiva normal.  Conjugate gaze  Ear, nose, throat -No scars, masses, or lesions over external nose or ears, no atrophy of tongue  Neck-symmetric, no masses noted, no jugular vein distension  Respiration- chest wall appears symmetric, goodexpansion, normal effort without use of accessory muscles  Musculoskeletal - no significant wasting of muscles noted, no bony deformities, gait no gross ataxia  Extremities-no clubbing, cyanosis or edema  Skin- warm, dry, and intact.  No rash, erythema, or pallor.  Psychiatric - mood, affect, and behavior appear normal.     Neurologic Examination  Awake, Alert and oriented x 4  Normal speech pattern, following commands     Motor:  RIGHT:                   iliopsoas 5/5               hamstring 5/5               quadriceps 5/5               EHL 5/5   Tibialis anterior 5/5               Gastrocnemius 5/5     LEFT:                iliopsoas 5/5               hamstring 5/5               quadriceps 5/5               EHL 5/5   Tibialis anterior 5/5               Gastrocnemius 5/5     No deficits to light touch or pinprick sensation  Reflexes are absent BLE  No clonus or Hoffmans sign  No myofacial tenderness to palpation  Slightly antalgic Gait pattern; no assistive device today      Wound:  Well healed       DATA and IMAGING:    Lab Results   Component Value Date    WBC 11.9 (H) 06/19/2024    HGB 10.5 (L) 06/19/2024    HCT 33.6 (L) 06/19/2024    .3 (H) 06/19/2024     06/19/2024     Lab Results   Component Value Date     06/18/2024    K 4.5 06/18/2024    CL 96 (L) 06/18/2024    CO2 26 06/18/2024    BUN 29 (H) 06/18/2024    CREATININE 0.8 06/18/2024    GLUCOSE 111 (H) 06/18/2024    CALCIUM 9.1 06/18/2024    BILITOT 0.2 06/04/2024    ALKPHOS 121 (H) 06/04/2024    AST 14 06/04/2024    ALT 12 06/04/2024    LABGLOM 84 06/18/2024    GFRAA >59 01/27/2021     Lab Results   Component Value Date    INR 0.92 06/04/2024    INR 0.96

## 2025-04-30 ENCOUNTER — HOSPITAL ENCOUNTER (OUTPATIENT)
Dept: MRI IMAGING | Age: 60
Discharge: HOME | End: 2025-04-30
Payer: COMMERCIAL

## 2025-04-30 DIAGNOSIS — M51.360 DEGENERATION OF INTERVERTEBRAL DISC OF LUMBAR REGION WITH DISCOGENIC BACK PAIN: ICD-10-CM

## 2025-04-30 DIAGNOSIS — Z98.1 S/P LUMBAR FUSION: ICD-10-CM

## 2025-04-30 DIAGNOSIS — M79.18 BILATERAL BUTTOCK PAIN: ICD-10-CM

## 2025-04-30 DIAGNOSIS — M41.80 LEVOSCOLIOSIS: ICD-10-CM

## 2025-04-30 PROCEDURE — 6360000004 HC RX CONTRAST MEDICATION: Performed by: NURSE PRACTITIONER

## 2025-04-30 PROCEDURE — A9577 INJ MULTIHANCE: HCPCS | Performed by: NURSE PRACTITIONER

## 2025-04-30 PROCEDURE — 72158 MRI LUMBAR SPINE W/O & W/DYE: CPT

## 2025-04-30 RX ADMIN — GADOBENATE DIMEGLUMINE 20 ML: 529 INJECTION, SOLUTION INTRAVENOUS at 09:49

## 2025-06-03 ENCOUNTER — OFFICE VISIT (OUTPATIENT)
Dept: CARDIOLOGY CLINIC | Age: 60
End: 2025-06-03
Payer: COMMERCIAL

## 2025-06-03 ENCOUNTER — OFFICE VISIT (OUTPATIENT)
Dept: NEUROSURGERY | Age: 60
End: 2025-06-03
Payer: COMMERCIAL

## 2025-06-03 VITALS
OXYGEN SATURATION: 96 % | BODY MASS INDEX: 44.37 KG/M2 | HEIGHT: 60 IN | SYSTOLIC BLOOD PRESSURE: 124 MMHG | DIASTOLIC BLOOD PRESSURE: 80 MMHG | HEART RATE: 65 BPM | WEIGHT: 226 LBS

## 2025-06-03 VITALS
WEIGHT: 226 LBS | HEART RATE: 64 BPM | HEIGHT: 60 IN | SYSTOLIC BLOOD PRESSURE: 120 MMHG | BODY MASS INDEX: 44.37 KG/M2 | DIASTOLIC BLOOD PRESSURE: 71 MMHG

## 2025-06-03 DIAGNOSIS — M79.18 BILATERAL BUTTOCK PAIN: Primary | ICD-10-CM

## 2025-06-03 DIAGNOSIS — M51.360 DEGENERATION OF INTERVERTEBRAL DISC OF LUMBAR REGION WITH DISCOGENIC BACK PAIN: ICD-10-CM

## 2025-06-03 DIAGNOSIS — Z98.1 S/P LUMBAR FUSION: ICD-10-CM

## 2025-06-03 DIAGNOSIS — I10 ESSENTIAL HYPERTENSION: Primary | ICD-10-CM

## 2025-06-03 DIAGNOSIS — E78.5 DYSLIPIDEMIA: ICD-10-CM

## 2025-06-03 DIAGNOSIS — R00.2 PALPITATIONS: ICD-10-CM

## 2025-06-03 DIAGNOSIS — M41.80 LEVOSCOLIOSIS: ICD-10-CM

## 2025-06-03 DIAGNOSIS — R00.0 TACHYCARDIA: ICD-10-CM

## 2025-06-03 PROCEDURE — 3079F DIAST BP 80-89 MM HG: CPT | Performed by: NURSE PRACTITIONER

## 2025-06-03 PROCEDURE — 3074F SYST BP LT 130 MM HG: CPT | Performed by: NURSE PRACTITIONER

## 2025-06-03 PROCEDURE — 3078F DIAST BP <80 MM HG: CPT | Performed by: NURSE PRACTITIONER

## 2025-06-03 PROCEDURE — 99214 OFFICE O/P EST MOD 30 MIN: CPT | Performed by: NURSE PRACTITIONER

## 2025-06-03 PROCEDURE — 93000 ELECTROCARDIOGRAM COMPLETE: CPT | Performed by: NURSE PRACTITIONER

## 2025-06-03 RX ORDER — ALLOPURINOL 100 MG/1
100 TABLET ORAL DAILY
COMMUNITY
Start: 2025-04-18

## 2025-06-03 ASSESSMENT — ENCOUNTER SYMPTOMS
GASTROINTESTINAL NEGATIVE: 1
BACK PAIN: 1
WHEEZING: 0
COUGH: 0
CHEST TIGHTNESS: 0
SHORTNESS OF BREATH: 0
SORE THROAT: 0
EYES NEGATIVE: 1
RESPIRATORY NEGATIVE: 1

## 2025-06-03 NOTE — PROGRESS NOTES
[facility]   Established Patient Office Visit  [address]  [address]  [facility] [id number] [phone]        OFFICE VISIT:  6/3/2025    Ludmila Rodríguez - : 1965    Reason For Visit:  Ludmila is a 59 y.o. female who is here for 6 Month Follow-Up and Hypertension    1. Essential hypertension    2. Tachycardia    3. Palpitations    4. Dyslipidemia         Patient with negative family history of early coronary artery disease. She is a current smoker. She does have a history of hypertension and hyperlipidemia. She denies diabetes, COPD, or chronic kidney disease.       Patient presents to clinic today for routine follow-up.  Patient denies any chest pain, pressure or tightness.  There is no shortness of breath, orthopnea or PND.  Patient denies any lightheadedness, dizziness or syncope.   DATA:   Summary   Mitral annular calcification is present.   Mildly thickened mitral valve with normal leaflet mobility. No evidence of   mitral valve stenosis or mitral regurgitation.   Aortic valve appears to be tricuspid.   Structurally normal aortic valve.   No significant aortic regurgitation or stenosis is noted.   Tricuspid valve is structurally normal.   Trivial tricuspid regurgitation with estimated RVSP of 36 mm Hg.   Normal left ventricular size with preserved LV function and an estimated   ejection fraction of approximately 65-70%.   Mild concentric left ventricular hypertrophy.   No regional wall motion abnormalities.   No evidence of left ventricular mass or thrombus noted.      Signature      ----------------------------------------------------------------   Electronically signed by Hussein Solis MD(Interpreting   physician) on 2023 08:54 AM   ---------------------------------------------------------------     Summary   The resting echocardiogram was normal.   Dobutamine stress echocardiogram without clinical, electrocardiographic,   or echocardiographic evidence of myocardial ischemia.   There

## 2025-06-03 NOTE — PROGRESS NOTES
Review of Systems   Constitutional: Negative.    HENT: Negative.     Eyes: Negative.    Respiratory: Negative.     Cardiovascular: Negative.    Gastrointestinal: Negative.    Genitourinary: Negative.    Musculoskeletal:  Positive for back pain.   Skin: Negative.    Neurological: Negative.    Endo/Heme/Allergies: Negative.    Psychiatric/Behavioral: Negative.         
body.     There is normal bone marrow signal.  The vertebral body heights are maintained.     The lumbar lordosis is maintained, without significant spondylolisthesis.     The conus medullaris terminates at L1. The cauda equina is normal in signal and morphology.     There is no epidural fluid collection/hematoma.     There is fatty atrophy of the lower lumbar paraspinous muscles.     The visualized portions of the abdominopelvic viscera are grossly unremarkable.     There is no abnormal enhancement.     T12-L1: There is mild bilateral facet hypertrophy.  There is no significant disc herniation, spinal canal stenosis, or neuroforaminal stenosis.     L1-L2: There is bilateral facet hypertrophy.  There is no significant disc herniation, spinal canal stenosis, or neuroforaminal stenosis.     L2-L3: Postoperative changes are noted.  There is no definite spinal canal or neural foraminal stenosis.     L3-L4: Postoperative changes are noted.  There is right facet hypertrophy.  There is no significant spinal canal or neural foraminal stenosis.     L4-L5: Postoperative changes are noted.  There is a small central disc protrusion. There is left foraminal disc protrusion, with mild left neural foraminal stenosis. There is right facet hypertrophy. No definite spinal canal stenosis is noted.     L5-S1: There is mild bilateral facet hypertrophy.  There is left foraminal spurring.  There is no definite spinal canal or neural foraminal stenosis.     IMPRESSION:  1. Prior left hemilaminectomy at L3-L5, posterior interbody fusion of L2-L3 and interbody fusion of L3-L5.  2. 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   ______________________________________   Electronically signed by: TOMAS HUI M.D.  Date:     04/30/2025  I have personally reviewed the images and my interpretation is:  Evidence of previous instrumented fusion L2-L5  The canal

## 2025-08-20 ENCOUNTER — TELEPHONE (OUTPATIENT)
Dept: CARDIOLOGY CLINIC | Age: 60
End: 2025-08-20

## (undated) DEVICE — SUTURE VICRYL + SZ 2-0 L18IN ABSRB UD CT1 L36MM 1/2 CIR VCP839D

## (undated) DEVICE — E-Z CLEAN, NON-STICK, PTFE COATED, ELECTROSURGICAL BLADE ELECTRODE, BAYONET, MODIFIED EXTENDED INSULATION, 6.5 INCH (16.5 CM): Brand: MEGADYNE

## (undated) DEVICE — MICRO KOVER: Brand: UNBRANDED

## (undated) DEVICE — TOWEL,OR,DSP,ST,BLUE,DLX,4/PK,20PK/CS: Brand: MEDLINE

## (undated) DEVICE — C-ARMOR C-ARM EQUIPMENT COVERS CLEAR STERILE UNIVERSAL FIT 12 PER CASE: Brand: C-ARMOR

## (undated) DEVICE — NEEDLE SPNL 22GA L3.5IN BLK HUB S STL REG WALL FIT STYL W/

## (undated) DEVICE — INSTRUMENT 8675424 LG DISPOSABLE DILATOR

## (undated) DEVICE — UNDERGLOVE SURG SZ 8 FNGR THK0.21MIL GRN LTX BEAD CUF

## (undated) DEVICE — GLOVE SURG SZ 75 L12IN FNGR THK94MIL TRNSLUC YEL LTX

## (undated) DEVICE — AGENT HEMOSTATIC SURGIFLOW MATRIX KIT W/THROMBIN

## (undated) DEVICE — TOTAL TRAY, 16FR 10ML SIL FOLEY, URN: Brand: MEDLINE

## (undated) DEVICE — NEEDLE NERVE STIM TROCAR PEDCL ACCS NAV PK

## (undated) DEVICE — TIBURON LAPAROTOMY DRAPE: Brand: CONVERTORS

## (undated) DEVICE — ACCESS KIT TROCAR NDL PK BVL

## (undated) DEVICE — GOWN,PREVENTION PLUS,L,ST,24/CS: Brand: MEDLINE

## (undated) DEVICE — ELECTRODE ES AD PED L2.5IN TEF INSUL MOD NONCORDED BLDE TIP

## (undated) DEVICE — E-Z CLEAN, NON-STICK, PTFE COATED, ELECTROSURGICAL BLADE ELECTRODE, MODIFIED EXTENDED INSULATION, 2.5 INCH (6.35 CM): Brand: MEGADYNE

## (undated) DEVICE — NEURO CDS

## (undated) DEVICE — PENCIL BTTN S S CAUT TIP W HOLSTER 25 50

## (undated) DEVICE — GLOVE SURG SZ 75 CRM LTX FREE POLYISOPRENE POLYMER BEAD ANTI

## (undated) DEVICE — LARYNGOSCOPE BLDE MAC HNDL M SZ 35 ST CURAPLEX CURAVIEW LED

## (undated) DEVICE — GOWN, ORBIS, LARGE, STERILE: Brand: MEDLINE

## (undated) DEVICE — SUTURE VICRYL SZ 3-0 L18IN ABSRB UD L26MM SH 1/2 CIR J864D

## (undated) DEVICE — SUTURE VCRL SZ 2-0 L18IN ABSRB UD CT-1 L36MM 1/2 CIR J839D

## (undated) DEVICE — SPHERE EYE 1 MRK GUIDANCE PASS STEALTHSTATION 1PK/EA

## (undated) DEVICE — SPK10281 JACKSON TABLE KIT: Brand: SPK10281 JACKSON TABLE KIT

## (undated) DEVICE — TOOL MR8-T12MH25L MR8 12CM TL MH L 2.5MM: Brand: MIDAS REX MR8

## (undated) DEVICE — TOOL T12MH25L LGD 12CM 2.5MM MATCH LG: Brand: MIDAS REX®

## (undated) DEVICE — BLANKET WRM W29.9XL79.1IN UP BODY FORC AIR MISTRAL-AIR

## (undated) DEVICE — Device

## (undated) DEVICE — ELECTRODE BLDE MOD BAYNT EZ CLN

## (undated) DEVICE — SUTURE VCRL SZ 3-0 L18IN ABSRB UD L26MM SH 1/2 CIR J864D

## (undated) DEVICE — TUBE ET 7.5MM NSL ORAL BASIC CUF INTMED MURPHY EYE RADPQ

## (undated) DEVICE — BAG BND W36XL36IN TRNSPAR POLY GEN PURP W E BND CLSR TIDI

## (undated) DEVICE — INSTRUMENT 8670001 SXT GUIDEWIRE BLUNT

## (undated) DEVICE — GLOVE SURG SZ 8 L12IN FNGR THK79MIL GRN LTX FREE

## (undated) DEVICE — TUBE ET 7MM NSL ORAL BASIC CUF INTMED MURPHY EYE RADPQ MRK

## (undated) DEVICE — DRESSING TRNSPAR W5XL4.5IN FLM SHT SEMIPERMEABLE WIND

## (undated) DEVICE — SHEET,DRAPE,53X77,STERILE: Brand: MEDLINE

## (undated) DEVICE — CURAVIEW LED LARYNGOSCOPE BLADE & HANDLE,DISPOSABLE,MAC 3: Brand: CURAPLEX

## (undated) DEVICE — ACCESS KIT TROCAR NDL PK